# Patient Record
Sex: FEMALE | Race: WHITE | Employment: OTHER | URBAN - METROPOLITAN AREA
[De-identification: names, ages, dates, MRNs, and addresses within clinical notes are randomized per-mention and may not be internally consistent; named-entity substitution may affect disease eponyms.]

---

## 2017-02-21 ENCOUNTER — OFFICE VISIT (OUTPATIENT)
Dept: DERMATOLOGY | Facility: AMBULATORY SURGERY CENTER | Age: 77
End: 2017-02-21

## 2017-02-21 VITALS
OXYGEN SATURATION: 97 % | HEART RATE: 67 BPM | BODY MASS INDEX: 25.03 KG/M2 | WEIGHT: 136 LBS | TEMPERATURE: 98.6 F | RESPIRATION RATE: 17 BRPM | HEIGHT: 62 IN | SYSTOLIC BLOOD PRESSURE: 120 MMHG | DIASTOLIC BLOOD PRESSURE: 68 MMHG

## 2017-02-21 DIAGNOSIS — Z85.89 HISTORY OF SQUAMOUS CELL CARCINOMA: ICD-10-CM

## 2017-02-21 DIAGNOSIS — C44.319 BASAL CELL CARCINOMA OF LEFT CHEEK: Primary | ICD-10-CM

## 2017-02-21 RX ORDER — TRAMADOL HYDROCHLORIDE 50 MG/1
50 TABLET ORAL
COMMUNITY
End: 2017-05-16

## 2017-02-21 NOTE — PROGRESS NOTES
Written by Kae Hess, as dictated by [employer] Duane Burrow, [address]. Name: Jeovanny Palafox       Age: 68 y.o. Date: 2/21/2017    Chief Complaint: had concerns including Other. Subjective:    HPI:  Ms.. Jeovanny Palafox is a 68 y.o. female who was kindly referred by Dr. Caitlin Vences and presents for a consult prior to Mohs surgery for lesions on the right and left cheeks. The lesion appeared less than 6 months ago. The patient has not had any prior treatment. Associated symptoms include subtle persistent bump. The patient reports she cannot see either lesion and that both were noted by Dr. Caitlin Vences. 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 She reports that she saw Dr. Caitlin Vences last week and that  Dr. Caitlin Vences was unable to identify any remaining squamous cell carcinoma on her right cheek. Per the patient's report, Dr. Conrad Mullerer on continued observation of the lesion rather than any treatment at this time. She also notes that she has a polyp in her left nostril and is wondering if this will affect her surgery. ROS: Consitutional: Negative. Dermatological : positive for - basal and squamous cell carcinomas    Social History     Social History    Marital status: SINGLE     Spouse name: N/A    Number of children: N/A    Years of education: N/A     Occupational History    Not on file.      Social History Main Topics    Smoking status: Never Smoker    Smokeless tobacco: Never Used    Alcohol use No    Drug use: No    Sexual activity: Not on file     Other Topics Concern    Not on file     Social History Narrative       Family History   Problem Relation Age of Onset    Heart Disease Mother     Cancer Father 61     lung    Cataract Daughter      congenital cataracts    Heart Disease Daughter      fast heart rythmn       Past Medical History   Diagnosis Date    Diverticulitis     Dystrophy, cornea     Environmental allergies     GERD (gastroesophageal reflux disease)     Hypotension     Multiple drug allergies     Osteoporosis     Vertebral compression fracture (HCC) 01/05/12    Vocal cord anomaly      vocal cord dysfunction per speech therapist       Past Surgical History   Procedure Laterality Date    Hc bld carpel tunnel release      Hx gyn       D&C    Hx cataract removal  07/2012       Current Outpatient Prescriptions   Medication Sig Dispense Refill    triamcinolone acetonide (KENALOG) 0.1 % topical cream Apply  to affected area two (2) times daily as needed for Skin Irritation. use thin layer 80 g 6    calcium citrate 200 mg (950 mg) tablet Take 200 mg by mouth two (2) times a day.  fluticasone (FLONASE) 50 mcg/actuation nasal spray 2 Sprays by Both Nostrils route daily.  docusate sodium (COLACE) 100 mg capsule Take 1 Cap by mouth two (2) times a day for 90 days. 60 Cap 2    cholecalciferol, vitamin D3, 2,000 unit tab Take 1 Tab by mouth daily.  cyanocobalamin (VITAMIN B12) 500 mcg tablet Take 500 mcg by mouth daily.  Dexlansoprazole (DEXILANT) 60 mg CpDB Take 1 Cap by mouth daily. 30 Cap 0    loteprednol etabonate (LOTEMAX) 0.5 % ophthalmic suspension Administer 1 Drop to both eyes two (2) times a day.          Allergies   Allergen Reactions    Clindamycin Anaphylaxis    Ketek [Telithromycin] Anaphylaxis    Levaquin [Levofloxacin] Anaphylaxis    Methylprednisolone Rash     Facial rash    Nexium [Esomeprazole Magnesium] Anaphylaxis    Other Medication Anaphylaxis     Allergic reaction to allergy shots for dogs, cats, and birds    Vibramycin [Doxycycline Calcium] Rash     Facial rash    Voltaren [Diclofenac Sodium] Other (comments)     Mild throat closing and severe nausea    Cortisone Rash     Can take depro medrol if preservative free only    Mobic [Meloxicam] Other (comments)     Throat closing, severe nausea, abd pain and facial swelling    Afrin [Pseudoephedrine Sulfate] Other (comments)     Facial numbness that lasted 45 minutes during testing    Antihistamine-1 Anaphylaxis    Azithromycin Other (comments)     \"neck stiffness & face numbness & swelling\"    Bactrim [Sulfamethoprim Ds] Rash     Facial rash and swelling    Biaxin [Clarithromycin] Rash     Severe facial rash and swelling    Ceftin [Cefuroxime Axetil] Unknown (comments)    Ciprofloxacin Other (comments)     \"numbness and tingling in foot & leg\"    Codeine Angioedema    Doxycycline Anaphylaxis    Flagyl [Metronidazole] Other (comments)     Reports on the 7th day of taking it \"I had SEVERE Cranial pain that was not a headache\"     Pcn [Penicillins] Anaphylaxis    Restasis [Cyclosporine] Angioedema    Sulfamethoxazole Other (comments)     headache    Xyzal [Levocetirizine] Anaphylaxis         Objective:    Visit Vitals    Ht 5' 2\" (1.575 m)    Wt 136 lb (61.7 kg)    BMI 24.87 kg/m2       Monalisa Rae is a 68 y.o. female who appears well and in no distress. She is awake, alert, and oriented. There is no preauricular, submandibular, or cervical lymphadenopathy. A limited skin evaluation was completed including her face. There is a 6 x 3 mm subtle shiny pink papule on her left cheek. There is a small white biopsy scar on her right cheek, no remaining features of basal cell carcinoma are clinically evident. Assessment/Plan:    1. Squamous cell carcinoma, right cheek. The diagnosis of squamous cell carcinoma was reviewed and we will follow with Dr. Bravo Daniel for continued observation. 2. Basal cell carcinoma, left cheek. The diagnosis and process of Mohs surgery with primary closure was discussed. We reviewed cure rate and risks. Mohs surgery is indicated by site and poor definition. The patient's questions were answered and she understands and agrees with the management plan. She requests that this management plan be sent to Dr. Wily Sam, her PCP.         This plan was reviewed with the patient and patient agrees. All questions were answered. This scribe documentation was reviewed by me and accurately reflects the examination and decisions made by me.

## 2017-02-21 NOTE — PATIENT INSTRUCTIONS
Common Skin Findings    Below are some common benign skin conditions that may have been discussed during your exam:    Dermatofibroma - a benign scar like bump that often looks like a mole but dimples in with lateral pressure. No treatment is required unless it is symptomatic or enlarging in which case a biopsy may become necessary. Nguyen Angiomas - these are the bright red bumps usually found on the abdomen or trunk. They are benign. Sometimes they will become irritated if traumatized, bleed and need to be removed. They often appear in middle-aged to older adults. Skin Tags - these are benign flesh or brown polyp-like growths common around areas of friction such as the neck, body creases, or around eyes. Nevus - this is the technical term for a benign mole. Changing nevi need to be evaluated and a biopsy may become necessary. Lentigo - this is a benign brown spot usually due to sun exposure. Seborrheic Keratosis - is a common skin growth that is benign and most often appearing in middle-aged to older adults. Most are tan or brown but may vary from flesh colored to black. These start as small bumps that slowly thicken and get a warty surface. A very useful website that you can visit for more information on common skin conditions:  yaltyReview.it  This website was created and is maintained by the [address] [place] [address] [employer]. It is the largest, most influential and most representative dermatology group in the [place].

## 2017-02-21 NOTE — PROGRESS NOTES
Ms. Britany Norton comes in for consultation. She is a 70-year-old lady referred by Dr. Antolin Álvarez. She has a biopsy-proven squamous cell carcinoma in situ on her right cheek, and Ms. Britany Norton reports that this has essentially fully resolved after the biopsy and she and Dr. Antolin Álvarez will be observing this site, no surgical intervention is advised at present. She has a second skin cancer, a biopsy proven basal cell carcinoma on her left cheek, and she wishes to discuss surgery. She has several questions regarding the procedure. She is feeling well and in her usual state of health today. She has no pain, no current illnesses, no other skin concerns. Her allergies medications medical and social history are reviewed by me today. I have reviewed the history, physical exam, assessment and plan by Anil Fuentes NP and agree. She has numerous allergies, I have reviewed then. She tolerates bandages and local anesthetic without difficulty. She is awake alert well appearing lady in no distress. I examined her face. She has thin delicate skin with inflammation and mild edema on her periocular areas. Her right cheek shows only a faint scar where she indicates biopsy was performed, no residual lesion evident. Her left medial cheek near the nose cheek angle has a subtle pink scar 6 x 3 mm. She confirms this is location of the basal cell carcinoma. She and Dr. Antolin Álvarez will observe the site of squamous cell carcinoma in situ on her right cheek for any recurrence, she is aware this could be treated if necessary per Dr. Nichole Tovar recommendation. For the basal cell carcinoma her left cheek, this is still clinically evident, and treatment with Mohs surgery is appropriate given the site size and poor definition of the margins. The procedure was discussed with her, anticipated curvilinear primary closure was reviewed. Her questions and concerns were answered. She will return for surgery on April 11.

## 2017-02-21 NOTE — MR AVS SNAPSHOT
Visit Information Date & Time Provider Department Dept. Phone Encounter #  
 2/21/2017  1:30 PM JERICHO Edouardirapita [id number] [phone] Your Appointments 4/11/2017  1:00 PM  
SURGERY with MD Manju Arora [address]) Appt Note: est pt (2 of 2 )Jose Enrique Mitchell Osmar [facility] [phone]  
  
   
 [address] [address]  
  
    
 4/27/2017  1:00 PM  
SURGERY with MD Manju Arora [address]) Appt Note: Np (1 of 2)SCC Rt Cheek; Np (2 of 2)SCC Rt Cheek [facility] [place] [address] [address] [place] [address]  
[phone] Upcoming Health Maintenance Date Due DTaP/Tdap/Td series (1 - Tdap) 4/28/1961 ZOSTER VACCINE AGE 60> 4/28/2000 GLAUCOMA SCREENING Q2Y 4/28/2005 MEDICARE YEARLY EXAM 4/28/2005 Allergies as of 2/21/2017  Review Complete On: 2/21/2017 By: Abigail Vides, MONIKAN Severity Noted Reaction Type Reactions Clindamycin High 11/09/2015    Anaphylaxis Ketek [Telithromycin] High 03/01/2011    Anaphylaxis Levaquin [Levofloxacin] High 11/09/2015    Anaphylaxis Methylprednisolone High 11/09/2015    Rash Facial rash Nexium [Esomeprazole Magnesium] High 11/09/2015    Anaphylaxis Other Medication High 11/09/2015    Anaphylaxis Allergic reaction to allergy shots for dogs, cats, and birds Vibramycin [Doxycycline Calcium] High 11/09/2015    Rash Facial rash Voltaren [Diclofenac Sodium] High 11/09/2015    Other (comments) Mild throat closing and severe nausea Cortisone Medium 11/09/2015    Rash Can take depro medrol if preservative free only Mobic [Meloxicam] Medium 11/09/2015    Other (comments) Throat closing, severe nausea, abd pain and facial swelling Afrin [Pseudoephedrine Sulfate]  11/09/2015    Other (comments) Facial numbness that lasted 45 minutes during testing Antihistamine-1  03/01/2011    Anaphylaxis Azithromycin  07/24/2012    Other (comments) \"neck stiffness & face numbness & swelling\" Bactrim [Sulfamethoprim Ds]  11/09/2015    Rash Facial rash and swelling Biaxin [Clarithromycin]  11/09/2015    Rash Severe facial rash and swelling Ceftin [Cefuroxime Axetil]  07/24/2012    Unknown (comments) Ciprofloxacin  07/24/2012    Other (comments) \"numbness and tingling in foot & leg\" Codeine  07/24/2012    Angioedema Doxycycline  03/01/2011    Anaphylaxis Flagyl [Metronidazole]  12/21/2016    Other (comments) Reports on the 7th day of taking it \"I had SEVERE Cranial pain that was not a headache\" Pcn [Penicillins]  03/01/2011    Anaphylaxis Restasis [Cyclosporine]  07/24/2012    Angioedema Sulfamethoxazole  07/24/2012    Other (comments)  
 headache Xyzal [Levocetirizine]  03/01/2011    Anaphylaxis Current Immunizations  Reviewed on 11/4/2016 Name Date Influenza High Dose Vaccine PF 9/25/2016 Pneumococcal Conjugate (PCV-13) 1/6/2017 Pneumococcal Polysaccharide (PPSV-23) 1/1/2010 Not reviewed this visit Vitals BP Pulse Temp Resp Height(growth percentile) Weight(growth percentile) 120/68 67 98.6 °F (37 °C) (Oral) 17 5' 2\" (1.575 m) 136 lb (61.7 kg) SpO2 BMI OB Status Smoking Status 97% 24.87 kg/m2 Postmenopausal Never Smoker Vitals History BMI and BSA Data Body Mass Index Body Surface Area  
 24.87 kg/m 2 1.64 m 2 Preferred Pharmacy Pharmacy Name Phone [id number] [phone] Your Updated Medication List  
  
   
This list is accurate as of: 2/21/17  1:37 PM.  Always use your most recent med list.  
  
  
  
  
 calcium citrate 200 mg (950 mg) tablet Take 200 mg by mouth two (2) times a day. cholecalciferol (vitamin D3) 2,000 unit Tab Take 1 Tab by mouth daily. cyanocobalamin 500 mcg tablet Commonly known as:  VITAMIN B12 Take 500 mcg by mouth daily. Dexlansoprazole 60 mg Cpdb Commonly known as:  DEXILANT Take 1 Cap by mouth daily. docusate sodium 100 mg capsule Commonly known as:  [place] Take 1 Cap by mouth two (2) times a day for 90 days. FLONASE 50 mcg/actuation nasal spray Generic drug:  fluticasone 2 Sprays by Both Nostrils route daily. loteprednol etabonate 0.5 % ophthalmic suspension Commonly known as:  Catherine Tara Administer 1 Drop to both eyes two (2) times a day. traMADol 50 mg tablet Commonly known as:  ULTRAM  
Take 50 mg by mouth every six (6) hours as needed for Pain.  
  
 triamcinolone acetonide 0.1 % topical cream  
Commonly known as:  KENALOG Apply  to affected area two (2) times daily as needed for Skin Irritation. use thin layer Patient Instructions Common Skin Findings Below are some common benign skin conditions that may have been discussed during your exam: 
 
Dermatofibroma  a benign scar like bump that often looks like a mole but dimples in with lateral pressure. No treatment is required unless it is symptomatic or enlarging in which case a biopsy may become necessary. Nguyen Angiomas  these are the bright red bumps usually found on the abdomen or trunk. They are benign. Sometimes they will become irritated if traumatized, bleed and need to be removed. They often appear in middle-aged to older adults. Skin Tags  these are benign flesh or brown polyp-like growths common around areas of friction such as the neck, body creases, or around eyes. Nevus  this is the technical term for a benign mole. Changing nevi need to be evaluated and a biopsy may become necessary. Lentigo  this is a benign brown spot usually due to sun exposure. Seborrheic Keratosis  is a common skin growth that is benign and most often appearing in middle-aged to older adults. Most are tan or brown but may vary from flesh colored to black. These start as small bumps that slowly thicken and get a warty surface. A very useful website that you can visit for more information on common skin conditions: 
LoyaltyReview.it This website was created and is maintained by the [facility] of Dermat[employer]. It is the largest, most influential and most representative dermatology group in the [place]. Introducing [facility] & [facility] SERVICES! Ric Rosado introduces Tripvi patient portal. Now you can access parts of your medical record, email your doctor's office, and request medication refills online. 1. In your internet browser, go to [email or web address] 2. Click on the First Time User? Click Here link in the Sign In box. You will see the New Member Sign Up page. 3. Enter your Tripvi Access Code exactly as it appears below. You will not need to use this code after youve completed the sign-up process. If you do not sign up before the expiration date, you must request a new code. · Tripvi Access Code: [email or web address] Expires: 3/5/2017 10:53 AM 
 
4. Enter the last four digits of your Social Security Number (xxxx) and Date of Birth (mm/dd/yyyy) as indicated and click Submit. You will be taken to the next sign-up page. 5. Create a Audiotoniqt ID. This will be your Tripvi login ID and cannot be changed, so think of one that is secure and easy to remember. 6. Create a Audiotoniqt password. You can change your password at any time. 7. Enter your Password Reset Question and Answer. This can be used at a later time if you forget your password. 8. Enter your e-mail address. You will receive e-mail notification when new information is available in [address] 9. Click Sign Up. You can now view and download portions of your medical record. 10. Click the Download Summary menu link to download a portable copy of your medical information. If you have questions, please visit the Frequently Asked Questions section of the "[employer] website. Remember, "[employer] is NOT to be used for urgent needs. For medical emergencies, dial 911. Now available from your iPhone and Android! Please provide this summary of care documentation to your next provider. Your primary care clinician is listed as Peter. If you have any questions after today's visit, please call [phone].

## 2017-03-30 ENCOUNTER — HOSPITAL ENCOUNTER (OUTPATIENT)
Dept: MAMMOGRAPHY | Age: 77
Discharge: HOME | End: 2017-03-30
Attending: INTERNAL MEDICINE
Payer: MEDICARE

## 2017-03-30 DIAGNOSIS — Z12.31 SCREENING MAMMOGRAM, ENCOUNTER FOR: ICD-10-CM

## 2017-03-30 PROCEDURE — 77067 SCR MAMMO BI INCL CAD: CPT

## 2017-04-03 ENCOUNTER — TELEPHONE (OUTPATIENT)
Dept: SURGERY | Age: 77
End: 2017-04-03

## 2017-04-03 ENCOUNTER — TELEPHONE (OUTPATIENT)
Dept: DERMATOLOGY | Facility: AMBULATORY SURGERY CENTER | Age: 77
End: 2017-04-03

## 2017-04-03 NOTE — TELEPHONE ENCOUNTER
Patient called previously, the reason for her second call is she gave the wrong phone number [phone] is the correct number. She has a questions, she read in the pamphlet that she is to stop all vitamins and supplements. She is concerned because she takes calcium and Vit B and is afraid to stop it all together.

## 2017-04-11 ENCOUNTER — OFFICE VISIT (OUTPATIENT)
Dept: DERMATOLOGY | Facility: AMBULATORY SURGERY CENTER | Age: 77
End: 2017-04-11

## 2017-04-11 VITALS
HEART RATE: 75 BPM | HEIGHT: 62 IN | OXYGEN SATURATION: 97 % | DIASTOLIC BLOOD PRESSURE: 64 MMHG | SYSTOLIC BLOOD PRESSURE: 110 MMHG | TEMPERATURE: 98.3 F

## 2017-04-11 DIAGNOSIS — C44.319 BASAL CELL CARCINOMA OF LEFT MEDIAL CHEEK: Primary | ICD-10-CM

## 2017-04-11 RX ORDER — BUPIVACAINE HYDROCHLORIDE AND EPINEPHRINE 2.5; 5 MG/ML; UG/ML
INJECTION, SOLUTION INFILTRATION; PERINEURAL
Qty: 1.5 ML | Refills: 0 | Status: CANCELLED
Start: 2017-04-11

## 2017-04-11 RX ORDER — LIDOCAINE HYDROCHLORIDE AND EPINEPHRINE 10; 10 MG/ML; UG/ML
3 INJECTION, SOLUTION INFILTRATION; PERINEURAL ONCE
Qty: 3 ML | Refills: 0
Start: 2017-04-11 | End: 2017-04-11

## 2017-04-11 RX ORDER — BUPIVACAINE HYDROCHLORIDE AND EPINEPHRINE 2.5; 5 MG/ML; UG/ML
INJECTION, SOLUTION INFILTRATION; PERINEURAL
Qty: 1.5 ML | Refills: 0
Start: 2017-04-11 | End: 2017-05-16

## 2017-04-11 RX ORDER — LIDOCAINE HYDROCHLORIDE AND EPINEPHRINE 10; 10 MG/ML; UG/ML
3 INJECTION, SOLUTION INFILTRATION; PERINEURAL ONCE
Qty: 3 ML | Refills: 0 | Status: CANCELLED
Start: 2017-04-11 | End: 2017-04-11

## 2017-04-11 NOTE — PROGRESS NOTES
This note is written by Melo Esquivel, as dictated by Mushtaq Winters. Germain Razo MD.    CC: Basal cell carcinoma on the left cheek    History of present illness:     Alia Lund is a 68 y.o. female referred by Dr. Shayne Perez. She has a biopsy-proven basal cell carcinoma on the left cheek. This is a new basal cell carcinoma present for less 6 months, no pain, bleeding, itching, or crusting, and no prior treatment. Dr. Shayne Perez first noted the lesion at a routine skin exam. Biopsy confirmed the diagnosis of micronodular basal cell carcinoma, and I reviewed the written pathology. She is feeling well and in her usual state of health today. She has no pain, no current illnesses, no other skin concerns. Her allergies, medications, medical, and social history are reviewed by me today. She has a history of squamous cell carcinoma in situ on her right cheek that was biopsied and clinically cleared by Dr. Shayne Perez; they are continuing to observe the lesion for any recurrence. Exam:     She is an awake, alert, and oriented 68 y.o. female who appears well and in no distress. There is no preauricular, submandibular, or cervical lymphadenopathy. I examined her face. Her right cheek shows only a faint scar where she indicates biopsy was performed, no residual lesion evident. Her left medial cheek near the nose cheek angle has a subtle white and pink scar 6 x 3 mm. She confirms location. Assessment/plan:    1. Basal cell carcinoma, left cheek. I discussed the diagnosis of basal cell carcinoma and summarized the pathology report. Mohs surgery is indicated by site, size, poor definition and a micronodular histology. The procedure was discussed, verbal and written consent were obtained. I performed the procedure. One stage was required to reach a tumor free plane. The surgical defect was managed with intermediate repair. There were no complications. She will follow up as needed as the site heals.     Indications, risks, and options were discussed with Kristin Amador preoperatively. Risks including, but not limited to: pain, bleeding, infection, tumor recurrence, scarring and damage to motor and/or sensory nerves, were discussed. Kristin Amador chose Mohs surgery. Kristin Amador was an acceptable surgery candidate. Kristin Amador was placed in the appropriate position on the operating table in the Mohs surgery procedure room. The area was prepped and draped in the standard manner. Gentian violet was used to outline the clinical margins of the tumor. Local anesthesia was then obtained. The grossly visible tumor was then removed, an underlying layer was excised and mapped according to the Mohs technique, and the individual specimens examined microscopically. The process was repeated until microscopic examination of the tissue specimens confirmed a tumor-free plane. Hemostasis was obtained with electrosurgery and pressure. The wound was covered between stages with moist saline gauze. Wound care instructions (written and verbal) and a follow up appointment were given to Kristin Amador before discharge. Krisitn Amador was discharged in good condition. The wound management options of second intent healing, layered closure, local flap, and/or full thickness skin graft were discussed. Kristin Amador understands the aims, risks, alternatives, and possible complications and elects to proceed with an intermediate layered closure. Wound margins were made vertical, edges undermined in the subcutaneous plane, standing cones corrected at both poles followed by layered closure. The wound was closed with buried 6-0 polysorb suture in the subcutis to reduce tension on the skin edges, and skin edges were approximated with 6-0 polysorb suture in the dermis to reduce tension on the epidermis. Few 6-0 fast gut sutures were used at the surface to further approximate skin edges. The final closure length was 16 mm.  The wound was bandaged with a pressure bandage utilizing Petroleum ointment, Telfa, gauze and Coverroll. Wound care instructions (written and verbal) and a follow up appointment were given to Roxanna Payton before discharge. Roxanna Payton was discharged in good condition. 2. Squamous cell carcinoma, left cheek. This site is well-healed with no evidence of residual.  She and Dr. Aleah Singh will observe the site of squamous cell carcinoma in situ on her right cheek for any recurrence, she is aware this could be treated if necessary per Dr. Jenaro Nuñez recommendation. 3. History of skin cancer. I discussed the diagnosis and recommend routine examinations with Dr. Aleah Singh for surveillance. The documentation recorded by the scribe accurately reflects the service I personally performed and the decisions made by me. [facility]   OFFICE PROCEDURE PROGRESS NOTE     Chart reviewed for the following:     Nely Pyle MD, have reviewed the History, Physical and updated the Allergic reactions for [address] performed immediately prior to start of procedure:     Nely Pyle MD, have performed the following reviews on Roxanna Payton prior to the start of the procedure:     * Patient was identified by name and date of birth   * Agreement on procedure being performed was verified   * Risks and Benefits explained to the patient   * Procedure site verified and marked as necessary   * Patient was positioned for comfort   * Consent was signed and verified     Time: 1:20 PM  Date of procedure: 4/11/2017  Procedure performed by: Farhan Pyle MD   Provider assisted by: LPN   Patient assisted by: self   How tolerated by patient: tolerated the procedure well with no complications   Comments: none

## 2017-04-11 NOTE — MR AVS SNAPSHOT
Visit Information Date & Time Provider Department Dept. Phone Encounter #  
 4/11/2017  1:00 PM Reji Gillette MD [facility] [phone] Upcoming Health Maintenance Date Due DTaP/Tdap/Td series (1 - Tdap) 4/28/1961 ZOSTER VACCINE AGE 60> 4/28/2000 GLAUCOMA SCREENING Q2Y 4/28/2005 MEDICARE YEARLY EXAM 4/28/2005 Allergies as of 4/11/2017  Review Complete On: 4/11/2017 By: Reji Gillette MD  
  
 Severity Noted Reaction Type Reactions Clindamycin High 11/09/2015    Anaphylaxis Ketek [Telithromycin] High 03/01/2011    Anaphylaxis Levaquin [Levofloxacin] High 11/09/2015    Anaphylaxis Methylprednisolone High 11/09/2015    Rash Facial rash Nexium [Esomeprazole Magnesium] High 11/09/2015    Anaphylaxis Other Medication High 11/09/2015    Anaphylaxis Allergic reaction to allergy shots for dogs, cats, and birds Vibramycin [Doxycycline Calcium] High 11/09/2015    Rash Facial rash Voltaren [Diclofenac Sodium] High 11/09/2015    Other (comments) Mild throat closing and severe nausea Cortisone Medium 11/09/2015    Rash Can take depro medrol if preservative free only Mobic [Meloxicam] Medium 11/09/2015    Other (comments) Throat closing, severe nausea, abd pain and facial swelling Afrin [Pseudoephedrine Sulfate]  11/09/2015    Other (comments) Facial numbness that lasted 45 minutes during testing Antihistamine-1  03/01/2011    Anaphylaxis Azithromycin  07/24/2012    Other (comments) \"neck stiffness & face numbness & swelling\" Bactrim [Sulfamethoprim Ds]  11/09/2015    Rash Facial rash and swelling Biaxin [Clarithromycin]  11/09/2015    Rash Severe facial rash and swelling Ceftin [Cefuroxime Axetil]  07/24/2012    Unknown (comments) Ciprofloxacin  07/24/2012    Other (comments) \"numbness and tingling in foot & leg\" Codeine  07/24/2012    Angioedema Doxycycline  03/01/2011    Anaphylaxis Flagyl [Metronidazole]  12/21/2016    Other (comments) Reports on the 7th day of taking it \"I had SEVERE Cranial pain that was not a headache\" Pcn [Penicillins]  03/01/2011    Anaphylaxis Restasis [Cyclosporine]  07/24/2012    Angioedema Sulfamethoxazole  07/24/2012    Other (comments)  
 headache Xyzal [Levocetirizine]  03/01/2011    Anaphylaxis Current Immunizations  Reviewed on 11/4/2016 Name Date Influenza High Dose Vaccine PF 9/25/2016 Pneumococcal Conjugate (PCV-13) 1/6/2017 Pneumococcal Polysaccharide (PPSV-23) 1/1/2010 Not reviewed this visit You Were Diagnosed With   
  
 Codes Comments Basal cell carcinoma of left medial cheek    -  Primary ICD-10-CM: C44.319 ICD-9-CM: 173.31 Vitals BP Pulse Temp Height(growth percentile) SpO2 OB Status 110/64 (BP 1 Location: Left arm, BP Patient Position: Sitting) 75 98.3 °F (36.8 °C) 5' 2\" (1.575 m) 97% Postmenopausal  
 Smoking Status Never Smoker Preferred Pharmacy Pharmacy Name Phone [id number] [place] [phone] Your Updated Medication List  
  
   
This list is accurate as of: 4/11/17  1:41 PM.  Always use your most recent med list.  
  
  
  
  
 calcium citrate 200 mg (950 mg) tablet Take 200 mg by mouth two (2) times a day. cholecalciferol (vitamin D3) 2,000 unit Tab Take 1 Tab by mouth daily. cyanocobalamin 500 mcg tablet Commonly known as:  VITAMIN B12 Take 500 mcg by mouth daily. Dexlansoprazole 60 mg Cpdb Commonly known as:  DEXILANT Take 1 Cap by mouth daily. FLONASE 50 mcg/actuation nasal spray Generic drug:  fluticasone 2 Sprays by Both Nostrils route daily. loteprednol etabonate 0.5 % ophthalmic suspension Commonly known as:  Landon Edward Administer 1 Drop to both eyes two (2) times a day. traMADol 50 mg tablet Commonly known as:  ULTRAM  
Take 50 mg by mouth every six (6) hours as needed for Pain.  
  
 triamcinolone acetonide 0.1 % topical cream  
Commonly known as:  KENALOG Apply  to affected area two (2) times daily as needed for Skin Irritation. use thin layer Patient Instructions WOUND CARE INSTRUCTIONS 1. Keep the dressing clean and dry and do not remove for 24/48 hours. 2. Then change the dressing once a day as follows: 
a. Wash hands before and after each dressing change. b. Remove dressing and wash site gently with mild soap and water, rinse, and pat dry. 
c. Apply an ointment (Petroleum jelly or Aquaphor). d. Apply a non-stick (Telfa) dressing or Band-Aid to cover the wound. Remove pressure bandage Thursday, then wash gently and apply Vaseline and a band-aid to site daily for 1 week. 3. Watch for: BLEEDING: A small amount of drainage may occur. If bleeding occurs, elevate and rest the surgery site. Apply gauze and steady pressure for 15 minutes. If bleeding continues, call this office. INFECTION: Signs of infection include increased redness, pain, warmth, drainage of pus, and fever. If this occurs, call this office. 4. Special Instructions (follow any that are checked): ·  You have stitches that need to be removed in 0 days ·  Avoid bending at the waist and heavy lifting for two days. ·  Sleep with your head elevated for the next two nights. ·  Rest the surgery site and keep it elevated as much as possible for two days. ·  You may apply an ice-pack for 10-15 minutes every waking hour for the rest of the day. ·  Eat a soft diet and avoid hot food and hot drinks for the rest of the day. ·  Other instructions: Follow up as directed. Take Tylenol for pain as needed.  
 
 
Once the site is healed with no remaining bandages or open areas, protect your surgical site and scar from the sun, as this area will be more sensitive. Use a broad spectrum sunscreen SPF 30 or higher daily, and a chemical free product (one containing zinc oxide or titanium dioxide) is a good choice if the area is sensitive. You may begin to gently massage the surgical site in 2-3 weeks, rubbing in a circular motion along the scar. This can help reduce swelling and thickness of a scar. A scar cream may be used beginnning 1 month after the surgery. If you have any questions or concerns, please call our office Monday through Friday at [phone]. Introducing [facility]! [employer] introduces [employer] patient portal. Now you can access parts of your medical record, email your doctor's office, and request medication refills online. 1. In your internet browser, go to [email or web address] 2. Click on the First Time User? Click Here link in the Sign In box. You will see the New Member Sign Up page. 3. Enter your [employer] Access Code exactly as it appears below. You will not need to use this code after youve completed the sign-up process. If you do not sign up before the expiration date, you must request a new code. · [employer] Access Code: [email or web address] Expires: 6/28/2017 10:29 AM 
 
4. Enter the last four digits of your Social Security Number (xxxx) and Date of Birth (mm/dd/yyyy) as indicated and click Submit. You will be taken to the next sign-up page. 5. Create a [employer] ID. This will be your [employer] login ID and cannot be changed, so think of one that is secure and easy to remember. 6. Create a [employer] password. You can change your password at any time. 7. Enter your Password Reset Question and Answer. This can be used at a later time if you forget your password. 8. Enter your e-mail address. You will receive e-mail notification when new information is available in [address] 9. Click Sign Up. You can now view and download portions of your medical record. 10. Click the Download Summary menu link to download a portable copy of your medical information. If you have questions, please visit the Frequently Asked Questions section of the [employer] website. Remember, [employer] is NOT to be used for urgent needs. For medical emergencies, dial 911. Now available from your iPhone and Android! Please provide this summary of care documentation to your next provider. Your primary care clinician is listed as Peter. If you have any questions after today's visit, please call [phone].

## 2017-04-11 NOTE — PROGRESS NOTES
Pre-op: Patient present today for the evaluation of Basal cell carcinoma to the Left medial cheek. Procedure explained with full understanding. Vitals:    04/11/17 1307   BP: 110/64   Pulse: 75   Temp: 98.3 °F (36.8 °C)   SpO2: 97%   Height: 5' 2\" (1.575 m)     preoperatively, will continue to monitor. Post-op: Written and verbal post-op wound care instructions given to patient with full understanding of care. Surgical wound bandaged with Vaseline, Telfa, 2x2 gauze, and coverall tape. All questions and concerns addressed. Vitals stable postoperatively.

## 2017-04-11 NOTE — PATIENT INSTRUCTIONS
WOUND CARE INSTRUCTIONS    1. Keep the dressing clean and dry and do not remove for 24/48 hours. 2. Then change the dressing once a day as follows:  a. Wash hands before and after each dressing change. b. Remove dressing and wash site gently with mild soap and water, rinse, and pat dry.  c. Apply an ointment (Petroleum jelly or Aquaphor). d. Apply a non-stick (Telfa) dressing or Band-Aid to cover the wound. Remove pressure bandage Thursday, then wash gently and apply Vaseline and a band-aid to site daily for 1 week. 3. Watch for:  BLEEDING: A small amount of drainage may occur. If bleeding occurs, elevate and rest the surgery site. Apply gauze and steady pressure for 15 minutes. If bleeding continues, call this office. INFECTION: Signs of infection include increased redness, pain, warmth, drainage of pus, and fever. If this occurs, call this office. 4. Special Instructions (follow any that are checked):  · [] You have stitches that need to be removed in 0 days  · [x] Avoid bending at the waist and heavy lifting for two days. · [x] Sleep with your head elevated for the next two nights. · [x] Rest the surgery site and keep it elevated as much as possible for two days. · [x] You may apply an ice-pack for 10-15 minutes every waking hour for the rest of the day. · [] Eat a soft diet and avoid hot food and hot drinks for the rest of the day. · [] Other instructions: Follow up as directed. Take Tylenol for pain as needed. Once the site is healed with no remaining bandages or open areas, protect your surgical site and scar from the sun, as this area will be more sensitive. Use a broad spectrum sunscreen SPF 30 or higher daily, and a chemical free product (one containing zinc oxide or titanium dioxide) is a good choice if the area is sensitive. You may begin to gently massage the surgical site in 2-3 weeks, rubbing in a circular motion along the scar.  This can help reduce swelling and thickness of a scar. A scar cream may be used beginnning 1 month after the surgery. If you have any questions or concerns, please call our office Monday through Friday at [phone].

## 2017-04-14 ENCOUNTER — TELEPHONE (OUTPATIENT)
Dept: DERMATOLOGY | Facility: AMBULATORY SURGERY CENTER | Age: 77
End: 2017-04-14

## 2017-04-17 ENCOUNTER — TELEPHONE (OUTPATIENT)
Dept: DERMATOLOGY | Facility: AMBULATORY SURGERY CENTER | Age: 77
End: 2017-04-17

## 2017-04-17 NOTE — TELEPHONE ENCOUNTER
Spoke to pt. Pt states she had 4 small bumps on her surgical site when she called Friday. Pt has been applying warm compresses and states area is improving. Pt denies any s/s of infection. Pt will continue to apply warm compresses and vaseline 2-3 times a day and call us back if there are any changes or if symptoms worsen. Dr General Bedoya made aware.

## 2017-04-17 NOTE — TELEPHONE ENCOUNTER
Patient stated that she had surgery last. She spoke with a nurse on Friday regarding blisters on the surgery site. There were 4 blisters and she was told to call back if the blisters where still there on today. There are 2 currently there so the area is improving but wanted to speak with a nurse regarding the site. Please call on her cell phone [phone].     Thanks,

## 2017-05-08 ENCOUNTER — HOSPITAL ENCOUNTER (OUTPATIENT)
Dept: CT IMAGING | Age: 77
Discharge: HOME | End: 2017-05-08
Attending: INTERNAL MEDICINE
Payer: MEDICARE

## 2017-05-08 DIAGNOSIS — R10.31 RLQ ABDOMINAL PAIN: ICD-10-CM

## 2017-05-08 LAB — CREAT BLD-MCNC: 0.6 MG/DL (ref 0.6–1.3)

## 2017-05-08 PROCEDURE — 74177 CT ABD & PELVIS W/CONTRAST: CPT

## 2017-05-08 PROCEDURE — 82565 ASSAY OF CREATININE: CPT

## 2017-05-08 PROCEDURE — 74011636320 HC RX REV CODE- 636/320: Performed by: INTERNAL MEDICINE

## 2017-05-08 PROCEDURE — 74011000258 HC RX REV CODE- 258: Performed by: INTERNAL MEDICINE

## 2017-05-08 RX ORDER — SODIUM CHLORIDE 0.9 % (FLUSH) 0.9 %
10 SYRINGE (ML) INJECTION
Status: COMPLETED | OUTPATIENT
Start: 2017-05-08 | End: 2017-05-08

## 2017-05-08 RX ADMIN — IOPAMIDOL 100 ML: 755 INJECTION, SOLUTION INTRAVENOUS at 16:20

## 2017-05-08 RX ADMIN — Medication 10 ML: at 16:21

## 2017-05-08 RX ADMIN — SODIUM CHLORIDE 100 ML: 900 INJECTION, SOLUTION INTRAVENOUS at 16:21

## 2017-05-08 RX ADMIN — IOHEXOL 50 ML: 240 INJECTION, SOLUTION INTRATHECAL; INTRAVASCULAR; INTRAVENOUS; ORAL at 16:21

## 2017-05-16 ENCOUNTER — APPOINTMENT (OUTPATIENT)
Dept: GENERAL RADIOLOGY | Age: 77
DRG: 641 | End: 2017-05-16
Attending: EMERGENCY MEDICINE
Payer: MEDICARE

## 2017-05-16 ENCOUNTER — HOSPITAL ENCOUNTER (INPATIENT)
Age: 77
LOS: 1 days | Discharge: HOME | DRG: 641 | End: 2017-05-17
Attending: EMERGENCY MEDICINE | Admitting: FAMILY MEDICINE
Payer: MEDICARE

## 2017-05-16 ENCOUNTER — APPOINTMENT (OUTPATIENT)
Dept: CT IMAGING | Age: 77
DRG: 641 | End: 2017-05-16
Attending: EMERGENCY MEDICINE
Payer: MEDICARE

## 2017-05-16 DIAGNOSIS — R10.32 ABDOMINAL PAIN, LLQ (LEFT LOWER QUADRANT): ICD-10-CM

## 2017-05-16 DIAGNOSIS — R19.7 DIARRHEA, UNSPECIFIED TYPE: ICD-10-CM

## 2017-05-16 DIAGNOSIS — E87.1 HYPONATREMIA: Primary | ICD-10-CM

## 2017-05-16 DIAGNOSIS — E86.0 DEHYDRATION: ICD-10-CM

## 2017-05-16 LAB
ALBUMIN SERPL BCP-MCNC: 4 G/DL (ref 3.5–5)
ALBUMIN/GLOB SERPL: 1.1 {RATIO} (ref 1.1–2.2)
ALP SERPL-CCNC: 51 U/L (ref 45–117)
ALT SERPL-CCNC: 37 U/L (ref 12–78)
ANION GAP BLD CALC-SCNC: 9 MMOL/L (ref 5–15)
APPEARANCE UR: CLEAR
AST SERPL W P-5'-P-CCNC: 35 U/L (ref 15–37)
BACTERIA URNS QL MICRO: NEGATIVE /HPF
BASOPHILS # BLD AUTO: 0.1 K/UL (ref 0–0.1)
BASOPHILS # BLD: 1 % (ref 0–1)
BILIRUB SERPL-MCNC: 0.3 MG/DL (ref 0.2–1)
BILIRUB UR QL: NEGATIVE
BUN SERPL-MCNC: 3 MG/DL (ref 6–20)
BUN/CREAT SERPL: 4 (ref 12–20)
CALCIUM SERPL-MCNC: 9.3 MG/DL (ref 8.5–10.1)
CHLORIDE SERPL-SCNC: 89 MMOL/L (ref 97–108)
CO2 SERPL-SCNC: 27 MMOL/L (ref 21–32)
COLOR UR: ABNORMAL
CREAT SERPL-MCNC: 0.73 MG/DL (ref 0.55–1.02)
EOSINOPHIL # BLD: 0.5 K/UL (ref 0–0.4)
EOSINOPHIL NFR BLD: 11 % (ref 0–7)
EPITH CASTS URNS QL MICRO: ABNORMAL /LPF
ERYTHROCYTE [DISTWIDTH] IN BLOOD BY AUTOMATED COUNT: 13.3 % (ref 11.5–14.5)
GLOBULIN SER CALC-MCNC: 3.6 G/DL (ref 2–4)
GLUCOSE SERPL-MCNC: 136 MG/DL (ref 65–100)
GLUCOSE UR STRIP.AUTO-MCNC: NEGATIVE MG/DL
HCT VFR BLD AUTO: 36.5 % (ref 35–47)
HGB BLD-MCNC: 12.4 G/DL (ref 11.5–16)
HGB UR QL STRIP: NEGATIVE
KETONES UR QL STRIP.AUTO: NEGATIVE MG/DL
LEUKOCYTE ESTERASE UR QL STRIP.AUTO: ABNORMAL
LIPASE SERPL-CCNC: 200 U/L (ref 73–393)
LYMPHOCYTES # BLD AUTO: 27 % (ref 12–49)
LYMPHOCYTES # BLD: 1.3 K/UL (ref 0.8–3.5)
MCH RBC QN AUTO: 28.6 PG (ref 26–34)
MCHC RBC AUTO-ENTMCNC: 34 G/DL (ref 30–36.5)
MCV RBC AUTO: 84.1 FL (ref 80–99)
MONOCYTES # BLD: 0.4 K/UL (ref 0–1)
MONOCYTES NFR BLD AUTO: 8 % (ref 5–13)
NEUTS SEG # BLD: 2.6 K/UL (ref 1.8–8)
NEUTS SEG NFR BLD AUTO: 53 % (ref 32–75)
NITRITE UR QL STRIP.AUTO: NEGATIVE
PH UR STRIP: 7 [PH] (ref 5–8)
PLATELET # BLD AUTO: 308 K/UL (ref 150–400)
POTASSIUM SERPL-SCNC: 4.3 MMOL/L (ref 3.5–5.1)
PROT SERPL-MCNC: 7.6 G/DL (ref 6.4–8.2)
PROT UR STRIP-MCNC: NEGATIVE MG/DL
RBC # BLD AUTO: 4.34 M/UL (ref 3.8–5.2)
RBC #/AREA URNS HPF: ABNORMAL /HPF (ref 0–5)
SODIUM SERPL-SCNC: 125 MMOL/L (ref 136–145)
SP GR UR REFRACTOMETRY: 1.01 (ref 1–1.03)
UA: UC IF INDICATED,UAUC: ABNORMAL
UROBILINOGEN UR QL STRIP.AUTO: 0.2 EU/DL (ref 0.2–1)
WBC # BLD AUTO: 4.9 K/UL (ref 3.6–11)
WBC URNS QL MICRO: ABNORMAL /HPF (ref 0–4)

## 2017-05-16 PROCEDURE — 36415 COLL VENOUS BLD VENIPUNCTURE: CPT | Performed by: FAMILY MEDICINE

## 2017-05-16 PROCEDURE — 80053 COMPREHEN METABOLIC PANEL: CPT | Performed by: EMERGENCY MEDICINE

## 2017-05-16 PROCEDURE — 74011636320 HC RX REV CODE- 636/320: Performed by: EMERGENCY MEDICINE

## 2017-05-16 PROCEDURE — 65270000029 HC RM PRIVATE

## 2017-05-16 PROCEDURE — 96361 HYDRATE IV INFUSION ADD-ON: CPT

## 2017-05-16 PROCEDURE — 99284 EMERGENCY DEPT VISIT MOD MDM: CPT

## 2017-05-16 PROCEDURE — 74011000258 HC RX REV CODE- 258: Performed by: EMERGENCY MEDICINE

## 2017-05-16 PROCEDURE — 74011250636 HC RX REV CODE- 250/636: Performed by: EMERGENCY MEDICINE

## 2017-05-16 PROCEDURE — 74000 XR ABD (KUB): CPT

## 2017-05-16 PROCEDURE — 74177 CT ABD & PELVIS W/CONTRAST: CPT

## 2017-05-16 PROCEDURE — 81001 URINALYSIS AUTO W/SCOPE: CPT | Performed by: FAMILY MEDICINE

## 2017-05-16 PROCEDURE — 85025 COMPLETE CBC W/AUTO DIFF WBC: CPT | Performed by: EMERGENCY MEDICINE

## 2017-05-16 PROCEDURE — 80048 BASIC METABOLIC PNL TOTAL CA: CPT | Performed by: FAMILY MEDICINE

## 2017-05-16 PROCEDURE — 83690 ASSAY OF LIPASE: CPT | Performed by: FAMILY MEDICINE

## 2017-05-16 PROCEDURE — 96360 HYDRATION IV INFUSION INIT: CPT

## 2017-05-16 RX ORDER — SODIUM CHLORIDE 0.9 % (FLUSH) 0.9 %
5-10 SYRINGE (ML) INJECTION AS NEEDED
Status: DISCONTINUED | OUTPATIENT
Start: 2017-05-16 | End: 2017-05-17 | Stop reason: HOSPADM

## 2017-05-16 RX ORDER — SODIUM CHLORIDE 0.9 % (FLUSH) 0.9 %
10 SYRINGE (ML) INJECTION
Status: COMPLETED | OUTPATIENT
Start: 2017-05-16 | End: 2017-05-16

## 2017-05-16 RX ORDER — DOCUSATE SODIUM 100 MG/1
100 CAPSULE, LIQUID FILLED ORAL
COMMUNITY

## 2017-05-16 RX ORDER — SODIUM CHLORIDE 0.9 % (FLUSH) 0.9 %
5-10 SYRINGE (ML) INJECTION EVERY 8 HOURS
Status: DISCONTINUED | OUTPATIENT
Start: 2017-05-16 | End: 2017-05-17 | Stop reason: HOSPADM

## 2017-05-16 RX ORDER — POLYETHYLENE GLYCOL 3350 17 G/17G
17 POWDER, FOR SOLUTION ORAL
COMMUNITY

## 2017-05-16 RX ADMIN — IOPAMIDOL 100 ML: 755 INJECTION, SOLUTION INTRAVENOUS at 19:16

## 2017-05-16 RX ADMIN — SODIUM CHLORIDE 1000 ML: 900 INJECTION, SOLUTION INTRAVENOUS at 19:05

## 2017-05-16 RX ADMIN — SODIUM CHLORIDE 100 ML: 900 INJECTION, SOLUTION INTRAVENOUS at 19:16

## 2017-05-16 RX ADMIN — Medication 10 ML: at 19:16

## 2017-05-16 NOTE — ED TRIAGE NOTES
Patient arrives from home for diverticulitis complications. Reports not having a solid bowel movement for eight days. Reports taking flagyl and bactrim. Patient reports taking miralax double dose yesterday without results. Patient is also taking colace. Reports abd pain and distention.

## 2017-05-16 NOTE — IP AVS SNAPSHOT
Current Discharge Medication List  
  
CONTINUE these medications which have NOT CHANGED Dose & Instructions Dispensing Information Comments Morning Noon Evening Bedtime CENTRUM SILVER WOMEN PO Your last dose was: Your next dose is:    
   
   
 Dose:  1 Tab Take 1 Tab by mouth daily. Refills:  0  
     
   
   
   
  
 cholecalciferol (vitamin D3) 2,000 unit Tab Your last dose was: Your next dose is:    
   
   
 Dose:  1 Tab Take 1 Tab by mouth daily. Refills:  0  
     
   
   
   
  
 CITRACAL PO Your last dose was: Your next dose is:    
   
   
 Dose:  1 Tab Take 1 Tab by mouth two (2) times a day. Refills:  0  
     
   
   
   
  
 cyanocobalamin 500 mcg tablet Commonly known as:  VITAMIN B12 Your last dose was: Your next dose is:    
   
   
 Dose:  500 mcg Take 500 mcg by mouth daily. Refills:  0 Dexlansoprazole 60 mg Cpdb Commonly known as:  DEXILANT Your last dose was: Your next dose is:    
   
   
 Dose:  60 mg Take 1 Cap by mouth daily. Quantity:  30 Cap Refills:  0  
     
   
   
   
  
 docusate sodium 100 mg capsule Commonly known as:  Angeles Angle Your last dose was: Your next dose is:    
   
   
 Dose:  100 mg Take 100 mg by mouth daily. Refills:  0  
     
   
   
   
  
 FLONASE 50 mcg/actuation nasal spray Generic drug:  fluticasone Your last dose was: Your next dose is:    
   
   
 Dose:  2 Spray 2 Sprays by Both Nostrils route daily. Refills:  0  
     
   
   
   
  
 loteprednol etabonate 0.5 % ophthalmic suspension Commonly known as:  Jamar Mary Your last dose was: Your next dose is:    
   
   
 Dose:  1 Drop Administer 1 Drop to both eyes two (2) times a day. Refills:  0 MIRALAX 17 gram packet Generic drug:  polyethylene glycol Your last dose was: Your next dose is:    
   
   
 Dose:  17 g Take 17 g by mouth daily. Refills:  0  
     
   
   
   
  
 trimethoprim-sulfamethoxazole 160-800 mg per tablet Commonly known as:  BACTRIM DS, SEPTRA DS Your last dose was: Your next dose is:    
   
   
 Dose:  1 Tab Take 1 Tab by mouth two (2) times a day for 10 days. Quantity:  20 Tab Refills:  0 STOP taking these medications   
 metroNIDAZOLE 500 mg tablet Commonly known as:  FLAGYL

## 2017-05-16 NOTE — ED PROVIDER NOTES
HPI Comments: 68 y.o. female with past medical history significant for GERD, diverticulitis, hypotension, and skin cancer who presents from home with chief complaint of abdominal distension. Pt states that 8 days ago she had a CT of her abdomen that showed mild diverticulitis(results reviewed below). She was started on Bactrim and Flagyl at the time. She has been taking the medications as prescribed but she has been feeling worse and worse. She has not had a full solid bowel movement in 8 days and states that she is constipated. She has only been having liquid bowel movements. She has been taking miralax and colace with minimal relief. She says that her abdomen is distended, she is nauseous, and she becomes dizzy every time she stands up. There are no other acute medical concerns at this time. Social hx: Never Smoker. Denies alcohol use  PCP: Torrey Badillo MD    5/8/2017 abd pelv ct:  IMPRESSION  IMPRESSION: Probable minimal sigmoid diverticulitis. No evidence of perforation  or abscess. Normal appendix. Note written by Shasta Conrad, as dictated by Patti Brooks MD 7:15 PM      The history is provided by the patient. No  was used.         Past Medical History:   Diagnosis Date    Diverticulitis     Dystrophy, cornea     Environmental allergies     Family history of skin cancer     GERD (gastroesophageal reflux disease)     Hypotension     Multiple drug allergies     Osteoporosis     Sun-damaged skin     Vertebral compression fracture ([facility] [address] 01/05/12    Vocal cord anomaly     vocal cord dysfunction per speech therapist       Past Surgical History:   Procedure Laterality Date    HC BLD CARPEL TUNNEL RELEASE      HX CATARACT REMOVAL  07/2012    HX GYN      D&C    HX MOHS PROCEDURES  04/11/2017    BCC left cheek by Dr. Irina Freeman         Family History:   Problem Relation Age of Onset    Heart Disease Mother     Cancer Father 61     lung    Cataract Daughter      congenital cataracts    Heart Disease Daughter      fast heart rythmn       Social History     Social History    Marital status:      Spouse name: N/A    Number of children: N/A    Years of education: N/A     Occupational History    Not on file. Social History Main Topics    Smoking status: Never Smoker    Smokeless tobacco: Never Used    Alcohol use No    Drug use: No    Sexual activity: Not on file     Other Topics Concern    Not on file     Social History Narrative         ALLERGIES: Clindamycin; Ketek [telithromycin]; Levaquin [levofloxacin]; Methylprednisolone; Nexium [esomeprazole magnesium]; Other medication; Vibramycin [doxycycline calcium]; Voltaren [diclofenac sodium]; Cortisone; Mobic [meloxicam]; Afrin [pseudoephedrine sulfate]; Antihistamine-1; Azithromycin; Bactrim [sulfamethoprim ds]; Biaxin [clarithromycin]; Ceftin [cefuroxime axetil]; Ciprofloxacin; Codeine; Doxycycline; Flagyl [metronidazole]; Pcn [penicillins]; Restasis [cyclosporine]; Sulfamethoxazole; and Xyzal [levocetirizine]    Review of Systems   Gastrointestinal: Positive for abdominal distention, abdominal pain, constipation, diarrhea and nausea. Neurological: Positive for dizziness. All other systems reviewed and are negative. Vitals:    05/16/17 1737   BP: 115/75   Pulse: 77   Resp: 16   SpO2: 96%   Weight: 57.6 kg (127 lb)   Height: 5' 2\" (1.575 m)            Physical Exam     Nursing note and vitals reviewed. Constitutional: appears well-developed and well-nourished. No distress. HENT:   Head: Normocephalic and atraumatic. Sclera anicteric  Nose: No rhinorrhea  Mouth/Throat: Oropharynx is clear and DRY. Pharynx normal  Eyes: Conjunctivae are normal. Pupils are equal, round, and reactive to light. Right eye exhibits no discharge. Left eye exhibits no discharge. No scleral icterus. Neck: Painless normal range of motion.  Supple  Cardiovascular: Normal rate, regular rhythm, normal heart sounds and intact distal pulses. Exam reveals no gallop and no friction rub. No murmur heard. Pulmonary/Chest: Effort normal and breath sounds normal. No respiratory distress. no wheezes. no rales. Abdominal: Soft. Bowel sounds are normal. Exhibits no distension and no mass. MILD TENDERNESS LLQ No guarding. Musculoskeletal: Normal range of motion. no tenderness. No edema  Lymphadenopathy:   No cervical adenopathy. Neurological:  Alert and oriented to person, place, and time. Coordination normal. CN 2-12 intact. Moving all extremities. Skin: Skin is warm and dry. No rash noted. No pallor. MDM  Number of Diagnoses or Management Options  Diagnosis management comments: 79-year-old female with a history of diverticulitis on day 8 of antibiotics here with continued lower abdominal pain which has improved but also with constipation and nausea. Differential diagnosis includes complications of diverticulitis such as abscess or perforation, continued diverticulitis, partial obstruction and others. Patient also with some dizziness upon standing so we'll give IV fluids. Check labs, CT abdomen and pelvis with IV contrast.  pt does not want to drink oral contrast.      ED Course       Procedures  PROGRESS NOTE:  8:19 PM  Pt's sodium 125. Pt symptomatic given her dizziness and feeling as if she will fall. Pt at risk from injury from fall related to hyponatremia.   will call hospitalist and admit. 8:20 PM  Patient is being admitted to the hospital.  The results of their tests and reasons for their admission have been discussed with them and/or available family. They convey agreement and understanding for the need to be admitted and for their admission diagnosis. Consultation will be made now with the inpatient physician for hospitalization. CONSULT NOTE:  9:09 PM Cony Sebastian MD spoke with Dr. Kelsey Carcamo, Consult for Hospitalist.  Discussed available diagnostic tests and clinical findings.   He is in agreement with care plans as outlined and will come admit the Pt. Recent Results (from the past 24 hour(s))   CBC WITH AUTOMATED DIFF    Collection Time: 05/16/17  5:44 PM   Result Value Ref Range    WBC 4.9 3.6 - 11.0 K/uL    RBC 4.34 3.80 - 5.20 M/uL    HGB 12.4 11.5 - 16.0 g/dL    HCT 36.5 35.0 - 47.0 %    MCV 84.1 80.0 - 99.0 FL    MCH 28.6 26.0 - 34.0 PG    MCHC 34.0 30.0 - 36.5 g/dL    RDW 13.3 11.5 - 14.5 %    PLATELET 860 603 - 519 K/uL    NEUTROPHILS 53 32 - 75 %    LYMPHOCYTES 27 12 - 49 %    MONOCYTES 8 5 - 13 %    EOSINOPHILS 11 (H) 0 - 7 %    BASOPHILS 1 0 - 1 %    ABS. NEUTROPHILS 2.6 1.8 - 8.0 K/UL    ABS. LYMPHOCYTES 1.3 0.8 - 3.5 K/UL    ABS. MONOCYTES 0.4 0.0 - 1.0 K/UL    ABS. EOSINOPHILS 0.5 (H) 0.0 - 0.4 K/UL    ABS. BASOPHILS 0.1 0.0 - 0.1 K/UL   METABOLIC PANEL, COMPREHENSIVE    Collection Time: 05/16/17  5:44 PM   Result Value Ref Range    Sodium 125 (L) 136 - 145 mmol/L    Potassium 4.3 3.5 - 5.1 mmol/L    Chloride 89 (L) 97 - 108 mmol/L    CO2 27 21 - 32 mmol/L    Anion gap 9 5 - 15 mmol/L    Glucose 136 (H) 65 - 100 mg/dL    BUN 3 (L) 6 - 20 MG/DL    Creatinine 0.73 0.55 - 1.02 MG/DL    BUN/Creatinine ratio 4 (L) 12 - 20      GFR est AA >60 >60 ml/min/1.73m2    GFR est non-AA >60 >60 ml/min/1.73m2    Calcium 9.3 8.5 - 10.1 MG/DL    Bilirubin, total 0.3 0.2 - 1.0 MG/DL    ALT (SGPT) 37 12 - 78 U/L    AST (SGOT) 35 15 - 37 U/L    Alk. phosphatase 51 45 - 117 U/L    Protein, total 7.6 6.4 - 8.2 g/dL    Albumin 4.0 3.5 - 5.0 g/dL    Globulin 3.6 2.0 - 4.0 g/dL    A-G Ratio 1.1 1.1 - 2.2         Xr Abd (kub)    Result Date: 5/16/2017  History: Constipation. An AP radiograph of the abdomen demonstrates an unremarkable bowel gas pattern. There are no significantly dilated large or small bowel loops. An L4 compression deformity is stable since 2016. IMPRESSION: Unremarkable bowel gas pattern.     Ct Abd Pelv W Cont    Result Date: 5/16/2017  INDICATION: lower abd pain and reported constipation with nausea, h/o diverticulitis COMPARISON: May 8 TECHNIQUE: Following the uneventful intravenous administration of 100 cc Isovue-370, thin axial images were obtained through the abdomen and pelvis. Coronal and sagittal reconstructions were generated. Oral contrast was not administered. CT dose reduction was achieved through use of a standardized protocol tailored for this examination and automatic exposure control for dose modulation. FINDINGS: LUNG BASES: There is subsegmental right basilar atelectasis. INCIDENTALLY IMAGED HEART AND MEDIASTINUM: Unremarkable. LIVER: No mass or biliary dilatation. GALLBLADDER: Unremarkable. SPLEEN: No mass. PANCREAS: No mass or ductal dilatation. ADRENALS: Unremarkable. KIDNEYS: There is a right renal cyst. STOMACH: Unremarkable. SMALL BOWEL: No dilatation or wall thickening. COLON: No dilatation or wall thickening. APPENDIX: Unremarkable. PERITONEUM: No ascites or pneumoperitoneum. RETROPERITONEUM: No lymphadenopathy or aortic aneurysm. REPRODUCTIVE ORGANS: URINARY BLADDER: No mass or calculus. BONES: No destructive bone lesion. Multiple compression deformities, unchanged. ADDITIONAL COMMENTS: N/A     IMPRESSION: No acute findings. The previously seen area of diverticulitis has resolved.

## 2017-05-17 VITALS
DIASTOLIC BLOOD PRESSURE: 65 MMHG | SYSTOLIC BLOOD PRESSURE: 98 MMHG | RESPIRATION RATE: 16 BRPM | HEIGHT: 62 IN | HEART RATE: 74 BPM | OXYGEN SATURATION: 95 % | BODY MASS INDEX: 23.08 KG/M2 | WEIGHT: 125.4 LBS | TEMPERATURE: 98.1 F

## 2017-05-17 LAB
ANION GAP BLD CALC-SCNC: 8 MMOL/L (ref 5–15)
ANION GAP BLD CALC-SCNC: 9 MMOL/L (ref 5–15)
BASOPHILS # BLD AUTO: 0 K/UL (ref 0–0.1)
BASOPHILS # BLD: 1 % (ref 0–1)
BUN SERPL-MCNC: 2 MG/DL (ref 6–20)
BUN SERPL-MCNC: 3 MG/DL (ref 6–20)
BUN/CREAT SERPL: 3 (ref 12–20)
BUN/CREAT SERPL: 5 (ref 12–20)
CALCIUM SERPL-MCNC: 8.5 MG/DL (ref 8.5–10.1)
CALCIUM SERPL-MCNC: 8.7 MG/DL (ref 8.5–10.1)
CHLORIDE SERPL-SCNC: 99 MMOL/L (ref 97–108)
CHLORIDE SERPL-SCNC: 99 MMOL/L (ref 97–108)
CO2 SERPL-SCNC: 24 MMOL/L (ref 21–32)
CO2 SERPL-SCNC: 25 MMOL/L (ref 21–32)
CREAT SERPL-MCNC: 0.62 MG/DL (ref 0.55–1.02)
CREAT SERPL-MCNC: 0.62 MG/DL (ref 0.55–1.02)
EOSINOPHIL # BLD: 0.6 K/UL (ref 0–0.4)
EOSINOPHIL NFR BLD: 15 % (ref 0–7)
ERYTHROCYTE [DISTWIDTH] IN BLOOD BY AUTOMATED COUNT: 13.4 % (ref 11.5–14.5)
GLUCOSE SERPL-MCNC: 91 MG/DL (ref 65–100)
GLUCOSE SERPL-MCNC: 93 MG/DL (ref 65–100)
HCT VFR BLD AUTO: 35.9 % (ref 35–47)
HGB BLD-MCNC: 12.2 G/DL (ref 11.5–16)
LYMPHOCYTES # BLD AUTO: 27 % (ref 12–49)
LYMPHOCYTES # BLD: 1 K/UL (ref 0.8–3.5)
MCH RBC QN AUTO: 28.6 PG (ref 26–34)
MCHC RBC AUTO-ENTMCNC: 34 G/DL (ref 30–36.5)
MCV RBC AUTO: 84.1 FL (ref 80–99)
MONOCYTES # BLD: 0.5 K/UL (ref 0–1)
MONOCYTES NFR BLD AUTO: 12 % (ref 5–13)
NEUTS SEG # BLD: 1.7 K/UL (ref 1.8–8)
NEUTS SEG NFR BLD AUTO: 45 % (ref 32–75)
PLATELET # BLD AUTO: 295 K/UL (ref 150–400)
POTASSIUM SERPL-SCNC: 3.7 MMOL/L (ref 3.5–5.1)
POTASSIUM SERPL-SCNC: 3.7 MMOL/L (ref 3.5–5.1)
RBC # BLD AUTO: 4.27 M/UL (ref 3.8–5.2)
SODIUM SERPL-SCNC: 132 MMOL/L (ref 136–145)
SODIUM SERPL-SCNC: 132 MMOL/L (ref 136–145)
TSH SERPL DL<=0.05 MIU/L-ACNC: 3.38 UIU/ML (ref 0.36–3.74)
WBC # BLD AUTO: 3.8 K/UL (ref 3.6–11)

## 2017-05-17 PROCEDURE — 74011250637 HC RX REV CODE- 250/637: Performed by: FAMILY MEDICINE

## 2017-05-17 PROCEDURE — 74011000250 HC RX REV CODE- 250: Performed by: FAMILY MEDICINE

## 2017-05-17 PROCEDURE — 85025 COMPLETE CBC W/AUTO DIFF WBC: CPT | Performed by: FAMILY MEDICINE

## 2017-05-17 PROCEDURE — 80048 BASIC METABOLIC PNL TOTAL CA: CPT | Performed by: FAMILY MEDICINE

## 2017-05-17 PROCEDURE — 84443 ASSAY THYROID STIM HORMONE: CPT | Performed by: FAMILY MEDICINE

## 2017-05-17 PROCEDURE — 36415 COLL VENOUS BLD VENIPUNCTURE: CPT | Performed by: FAMILY MEDICINE

## 2017-05-17 RX ORDER — DOCUSATE SODIUM 100 MG/1
100 CAPSULE, LIQUID FILLED ORAL DAILY
Status: DISCONTINUED | OUTPATIENT
Start: 2017-05-17 | End: 2017-05-17 | Stop reason: HOSPADM

## 2017-05-17 RX ORDER — LOTEPREDNOL ETABONATE 5 MG/ML
1 SUSPENSION/ DROPS OPHTHALMIC 2 TIMES DAILY
Status: DISCONTINUED | OUTPATIENT
Start: 2017-05-17 | End: 2017-05-17 | Stop reason: HOSPADM

## 2017-05-17 RX ORDER — ACETAMINOPHEN 325 MG/1
650 TABLET ORAL
Status: DISCONTINUED | OUTPATIENT
Start: 2017-05-17 | End: 2017-05-17 | Stop reason: HOSPADM

## 2017-05-17 RX ORDER — POLYETHYLENE GLYCOL 3350 17 G/17G
17 POWDER, FOR SOLUTION ORAL DAILY
Status: DISCONTINUED | OUTPATIENT
Start: 2017-05-17 | End: 2017-05-17 | Stop reason: HOSPADM

## 2017-05-17 RX ORDER — PANTOPRAZOLE SODIUM 40 MG/1
40 TABLET, DELAYED RELEASE ORAL
Status: DISCONTINUED | OUTPATIENT
Start: 2017-05-17 | End: 2017-05-17 | Stop reason: HOSPADM

## 2017-05-17 RX ORDER — MELATONIN
2000 DAILY
Status: DISCONTINUED | OUTPATIENT
Start: 2017-05-17 | End: 2017-05-17 | Stop reason: HOSPADM

## 2017-05-17 RX ORDER — FLUTICASONE PROPIONATE 50 MCG
2 SPRAY, SUSPENSION (ML) NASAL DAILY
Status: DISCONTINUED | OUTPATIENT
Start: 2017-05-17 | End: 2017-05-17 | Stop reason: HOSPADM

## 2017-05-17 RX ORDER — CALCIUM CARBONATE 500(1250)
500 TABLET ORAL 2 TIMES DAILY
Status: DISCONTINUED | OUTPATIENT
Start: 2017-05-17 | End: 2017-05-17 | Stop reason: HOSPADM

## 2017-05-17 RX ORDER — LANOLIN ALCOHOL/MO/W.PET/CERES
500 CREAM (GRAM) TOPICAL DAILY
Status: DISCONTINUED | OUTPATIENT
Start: 2017-05-17 | End: 2017-05-17 | Stop reason: HOSPADM

## 2017-05-17 RX ORDER — DEXLANSOPRAZOLE 60 MG/1
60 CAPSULE, DELAYED RELEASE ORAL DAILY
Status: DISCONTINUED | OUTPATIENT
Start: 2017-05-17 | End: 2017-05-17 | Stop reason: CLARIF

## 2017-05-17 RX ADMIN — LOTEPREDNOL ETABONATE 1 DROP: 5 SUSPENSION/ DROPS OPHTHALMIC at 06:51

## 2017-05-17 RX ADMIN — Medication 10 ML: at 01:04

## 2017-05-17 RX ADMIN — ACETAMINOPHEN 650 MG: 325 TABLET, FILM COATED ORAL at 01:52

## 2017-05-17 NOTE — ROUTINE PROCESS
TRANSFER - OUT REPORT:    Verbal report given to Tien Price Rn(name) on Koki Ortega  being transferred to (unit) for routine progression of care       Report consisted of patients Situation, Background, Assessment and   Recommendations(SBAR). Information from the following report(s) SBAR, Kardex, ED Summary and MAR was reviewed with the receiving nurse. Lines:   Peripheral IV 05/16/17 Right Antecubital (Active)   Site Assessment Clean, dry, & intact 5/16/2017  5:45 PM   Phlebitis Assessment 0 5/16/2017  5:45 PM   Infiltration Assessment 0 5/16/2017  5:45 PM   Dressing Status Clean, dry, & intact 5/16/2017  5:45 PM        Opportunity for questions and clarification was provided.       Patient transported with:  transport

## 2017-05-17 NOTE — PROGRESS NOTES
Care Management-Patient to ED for diverticulitis complications. Patient was diagnosed with diverticulitis as an outpatient. Per CT, this has resolved. Patient currently awaiting admission. Per patient, she is being admitted for low sodium. Met with patient and her daughter, Ele Philip ([phone]) in patient's ED room. Patient lives alone in an apartment. She does not use any medical equipment. She is independent with her ADLs and she drives. Patient last saw her PCP Dr. Reanna Bryant on 5-8-17. Patient uses [facility] on [facility]. She has used [address] in the past. No transition of care needs identified or verbalized at this time. Care Manager will be available to assist if any needs do arise. Care Management Interventions  PCP Verified by CM: Yes (Dr. Reanna Bryant)  Last Visit to PCP: 05/08/17  Mode of Transport at Discharge:  Other (see comment) (family or friend)  Discharge Durable Medical Equipment: No  Physical Therapy Consult: No  Occupational Therapy Consult: No  Speech Therapy Consult: No  Current Support Network: Lives Alone  Confirm Follow Up Transport: Family  Plan discussed with Pt/Family/Caregiver: Yes  Discharge Location  Discharge Placement: [facility], [facility]

## 2017-05-17 NOTE — PROGRESS NOTES
1000 MD called r/t pt stating BL foot numbness and tingling.   He will follow up at her discharge visit

## 2017-05-17 NOTE — PROGRESS NOTES
Admission Medication Reconciliation:    Information obtained from: patient    Significant PMH/Disease States:   Past Medical History:   Diagnosis Date    Diverticulitis     Dystrophy, cornea     Environmental allergies     Family history of skin cancer     GERD (gastroesophageal reflux disease)     Hypotension     Multiple drug allergies     Osteoporosis     Sun-damaged skin     Vertebral compression fracture ([facility] [address] 01/05/12    Vocal cord anomaly     vocal cord dysfunction per speech therapist       Chief Complaint for this Admission:    Chief Complaint   Patient presents with    Constipation    Nausea       Allergies:  Clindamycin; Ketek [telithromycin]; Levaquin [levofloxacin]; Methylprednisolone; Nexium [esomeprazole magnesium]; Other medication; Voltaren [diclofenac sodium]; Cortisone; Mobic [meloxicam]; Vibramycin [doxycycline calcium]; Afrin [pseudoephedrine sulfate]; Antihistamine-1; Azithromycin; Bactrim [sulfamethoprim ds]; Biaxin [clarithromycin]; Ceftin [cefuroxime axetil]; Ciprofloxacin; Codeine; Doxycycline; Flagyl [metronidazole]; Pcn [penicillins]; Restasis [cyclosporine]; and Xyzal [levocetirizine]    Prior to Admission Medications:   Prior to Admission Medications   Prescriptions Last Dose Informant Patient Reported? Taking? CALCIUM CITRATE (CITRACAL PO) 5/16/2017 at Unknown time  Yes Yes   Sig: Take 1 Tab by mouth two (2) times a day. Dexlansoprazole (DEXILANT) 60 mg CpDB 5/16/2017 at Unknown time  No Yes   Sig: Take 1 Cap by mouth daily. MULTIVIT-MIN/IRON/FOLIC/LUTEIN (CENTRUM SILVER WOMEN PO) 5/16/2017 at Unknown time  Yes Yes   Sig: Take 1 Tab by mouth daily. cholecalciferol, vitamin D3, 2,000 unit tab 5/16/2017 at Unknown time  Yes Yes   Sig: Take 1 Tab by mouth daily. cyanocobalamin (VITAMIN B12) 500 mcg tablet 5/16/2017 at Unknown time  Yes Yes   Sig: Take 500 mcg by mouth daily.    docusate sodium (COLACE) 100 mg capsule 5/16/2017 at Unknown time  Yes Yes   Sig: Take 100 mg by mouth daily. fluticasone (FLONASE) 50 mcg/actuation nasal spray 2017 at Unknown time  Yes Yes   Si Sprays by Both Nostrils route daily. loteprednol etabonate (LOTEMAX) 0.5 % ophthalmic suspension 2017 at am  Yes Yes   Sig: Administer 1 Drop to both eyes two (2) times a day. metroNIDAZOLE (FLAGYL) 500 mg tablet 2017 at 1200  No Yes   Sig: Take 1 Tab by mouth four (4) times daily for 10 days. polyethylene glycol (MIRALAX) 17 gram packet 2017 at Unknown time  Yes Yes   Sig: Take 17 g by mouth daily. trimethoprim-sulfamethoxazole (BACTRIM DS, SEPTRA DS) 160-800 mg per tablet 2017 at am  No Yes   Sig: Take 1 Tab by mouth two (2) times a day for 10 days.       Facility-Administered Medications: None         Comments/Recommendations:   Removed bupivacaine-epi, tramadol, triamcinolone  Added polyethylene glycol, docusate sodium, Centrum silver    Patient states she took extra doses of polyethylene glycol and docusate sodium yesterday    Metronidazole and Bactrim started 5/8/17 x10 days-  Patient states he MD told her she can stop the metronidazole today but to complete the Bactrim prescription (2 more days)

## 2017-05-17 NOTE — PROGRESS NOTES
Sodium better at 132. Home today. I rec some limited PO fluid restriction. She will cont another couple of days of Bactrim, but she will stop the Flagyl, as it was upsetting her stomach -- Diverticulitis resolved by CT yesterday anyway. F/u with me 1 week.

## 2017-05-17 NOTE — PROGRESS NOTES
Bedside shift change report given to joselito (oncoming nurse) by Mary Stallings (offgoing nurse). Report included the following information SBAR, Kardex and MAR.

## 2017-05-17 NOTE — DISCHARGE SUMMARY
Physician Discharge Summary     Patient ID:  Arabella Enriquez  [id number]  89 y.o.  1940    Admit date: 5/16/2017    Discharge date and time: 5/17/2017    Briefly, pt was admitted with Acute hyponatremia. For details of admission, see H&P. Hospital Course:  Pt was given IVF, and sodium improved to 132. Urine osm pending at this time. She will f/u with me in 1 week. Discharge Dx: same    Condition at discharge: improved    Disposition: home    Patient Instructions:   Current Discharge Medication List      CONTINUE these medications which have NOT CHANGED    Details   CALCIUM CITRATE (CITRACAL PO) Take 1 Tab by mouth two (2) times a day. polyethylene glycol (MIRALAX) 17 gram packet Take 17 g by mouth daily. docusate sodium (COLACE) 100 mg capsule Take 100 mg by mouth daily. MULTIVIT-MIN/IRON/FOLIC/LUTEIN (CENTRUM SILVER WOMEN PO) Take 1 Tab by mouth daily. trimethoprim-sulfamethoxazole (BACTRIM DS, SEPTRA DS) 160-800 mg per tablet Take 1 Tab by mouth two (2) times a day for 10 days. Qty: 20 Tab, Refills: 0    Associated Diagnoses: RLQ abdominal pain      fluticasone (FLONASE) 50 mcg/actuation nasal spray 2 Sprays by Both Nostrils route daily. cholecalciferol, vitamin D3, 2,000 unit tab Take 1 Tab by mouth daily. cyanocobalamin (VITAMIN B12) 500 mcg tablet Take 500 mcg by mouth daily. Dexlansoprazole (DEXILANT) 60 mg CpDB Take 1 Cap by mouth daily. Qty: 30 Cap, Refills: 0      loteprednol etabonate (LOTEMAX) 0.5 % ophthalmic suspension Administer 1 Drop to both eyes two (2) times a day. STOP taking these medications       metroNIDAZOLE (FLAGYL) 500 mg tablet Comments:   Reason for Stopping: Follow-up with Dr. Farrah East in 1 week.       Signed:  Karie Rondon MD  5/17/2017  7:11 AM

## 2017-05-17 NOTE — DISCHARGE INSTRUCTIONS
Patient Discharge Instructions    Jeannine Santiago / [id number] : 1940    Admitted 2017 Discharged: 2017     Take Home Medications       · It is important that you take the medication exactly as they are prescribed. · Keep your medication in the bottles provided by the pharmacist and keep a list of the medication names, dosages, and times to be taken in your wallet. · Do not take other medications without consulting your doctor. What to do at Home    Recommended diet: Regular Diet. Recommended activity: Activity as tolerated. Follow-up with Dr. Reva Mcnair in 1 week. Information obtained by :  I understand that if any problems occur once I am at home I am to contact my physician. I understand and acknowledge receipt of the instructions indicated above.                                                                                                                                            Physician's or R.N.'s Signature                                                                  Date/Time                                                                                                                                              Patient or Representative Signature                                                          Date/Time

## 2017-05-17 NOTE — PHYSICIAN ADVISORY
Letter of Status Determination:    Recommend Changing patient status from INPATIENT to OBSERVATION           Pt Name:  Bar Travis   MR#  [id number]   [facility]#   [id number]   [address] [place] [address]  214/01  @ [place] [address] hospital   Hospitalization date  5/16/2017  6:03 PM   Current Attending Physician  Jessica Butt MD   Principal diagnosis  Acute hyponatremia      Clinicals  68 y.o. y.o  female hospitalized with above diagnosis. The pt was treated for about 12 hours now. Her Na+ is improving. Etiology not established.    Pt is being discharged at this time      [facility] criteria   Does  NOT apply    STATUS DETERMINATION  On the basis of review of available clinical data, documentation in the chart  It is our recommendation that the patient's status is changed frm INPATIENT to OBSERVATION         The final decision of the patient's hospitalization status depends on the attending physician's judgment      Additional comments     Insurance  Payor: Shanice King / Plan: [address] / Product Type: Medicare /             Jolene Cuellar MD MPH FACP     Physician Advisor    [address]   [facility], [address] [place] [address]    Cell  [phone]

## 2017-05-17 NOTE — H&P
[address] [facility] [address]   HISTORY AND PHYSICAL       Name:  Lilibeth Linton   MR#:  [id number]   :  1940   Account #:  [de-identified]        Date of Adm:  2017       CHIEF COMPLAINT: Abdominal distention, nausea. HISTORY OF PRESENT ILLNESS: A 77-year-old white female with   past medical history of diverticulitis, GERD, hypotension, MULTIPLE   DRUG ALLERGIES, osteoporosis, vertebral compression fracture,   vocal cord dysfunction (unspecified), presented to the emergency   department from home with chief complaint of abdominal distention   and nausea. The patient reportedly had been diagnosed with   diverticulitis as noted on CT abdomen and pelvis with IV contrast on   2017 which shows probable minimal sigmoid diverticulitis with no   evidence of perforation or abscess with normal appendix. The patient   since has been taking BACTRIM and FLAGYL. She reportedly has   been taking medication as prescribed. She notes, however, she is only   to take the BACTRIM with food and notes the FLAGYL has been   causing her to have nausea. She notes that her last solid stool was   approximately 8 days ago and only has been having loose watery stool   for the last 8 days, minimal output. She reportedly has been taking   MiraLax and Colace with minimal relief of constipation. She has since   had progressive abdominal distention. She describes her pain as   \"discomfort\", rated now it is about a 1/10, mostly pointing to the   bilateral lower quadrants, \"sore\" in character without specific   exacerbating or alleviating factors. She does not complain of any   vomiting; however, she has had persistent nausea. She does not   complain of any chest pain, shortness of breath, cough, congestion. She complains of feeling \"wobbly\", unsteady on her feet, generalized   weakness, had some dizziness and lightheadedness.  Does not   complain of a headache, syncope, loss of consciousness, numbness,   paresthesias, slurred speech, facial droop, visual disturbance, dysuria,   hematuria, calf pain, swelling, edema or fever. However, she admits to   a few chills. On arrival to the emergency department, initial recorded vital signs   were blood pressure 115/75, heart rate 77, respiratory rate 16, O2   saturation 96% on room air. The patient was reportedly initially seen by   her PCP this evening. Initially there were plans to continue antibiotics,   however, this was prior to obtaining abdominal imaging. Since that   time, the patient has had a CT abdomen and pelvis with IV contrast,   which shows no acute findings with resolution of diverticulitis. The   patient notably had remained afebrile and white cell count has been   within normal limits. Per the ED, the patient was given 0.9% normal   saline 1000 mL IV fluid bolus x1. Labs showed evidence of   hyponatremia, sodium 125. The patient is now seen for admission to   the hospitalist service for continued evaluation and treatment with   noted hyponatremia. PAST MEDICAL HISTORY   1. Diverticulitis. 2. Cornea dystrophy. 3. GERD. 4. Hypotension. 5. MULTIPLE DRUG ALLERGIES. 6. Osteoporosis. 7. Sun damaged skin. 8. Vertebral compression fracture. 9. Back pain. 10. Vocal cord dysfunction, unspecified - per chart record. PAST SURGICAL HISTORY   1. Status post carpal tunnel release. 2. Cataract extraction/intraocular lens implant. 3. D and C.   4. Mohs procedure, basal cell carcinoma of the left cheek on   04/11/2017. MEDICATIONS    1. Calcium citrate 1 tablet p.o. b.i.d.   2. Vitamin D3 with 2000 units 1 tablet p.o. daily. 3. Vitamin B12 with 500 mcg p.o. daily. 4. Dexilant 60 mg 1 capsule p.o. daily. 5. Colace 100 mg p.o. daily. 6. Flonase 50 mcg per actuation 2 sprays both nostrils daily.    7. Lotemax 0.5% ophthalmic solution 1 drop both eyes b.i.d.   8. Flagyl 500 mg 1 tablet p.o. q.i.d.   9. Centrum Silver multivitamin 1 tablet p.o. daily. 10. MiraLax 17 grams p.o. daily. 11. Bactrim-/800 mg 1 tablet p.o. b.i.d. ALLERGIES   1. CLINDAMYCIN. 2. [address] 3. LEVAQUIN. [id number] [facility],[address]. 6. DICLOFENAC. 7. CORTISONE. 8. MOBIC. 9. VIBRAMYCIN (DOXYCYCLINE). 10. ASPIRIN. 11. ANTIHISTAMINE - 1. 12. AZITHROMYCIN. 13. BACTRIM (REACTION FACIAL SWELLING AND RASH). 14. ERYTHROMYCIN. 15. CEFUROXIME. 16. CIPROFLOXACIN. 17. CODEINE. 18. FLAGYL, \"I HAD SEVERE CRANIAL PAIN THAT WAS NOT A   HEADACHE\", PER CHART RECORDS. 19. PENICILLIN.   20. RESTASIS (CYCLOSPORINE). 21. XYZAL. SOCIAL HISTORY: She denies smoking, alcohol, illicit drugs. . FAMILY HISTORY: Heart disease in daughter, mother. Lung cancer -   father. Cataract - daughter. REVIEW OF SYSTEMS: Eleven systems reviewed; pertinent positives   as HPI, otherwise negative. VITAL SIGNS: Temperature 98.6 degrees Fahrenheit, blood pressure   107/66, heart rate of 73, respiratory rate 18, O2 saturation 97% on   room air. Recorded weight 127 pounds (57.6 kg), recorded height 5   feet 2 inches tall. PHYSICAL EXAMINATION   GENERAL: The patient in no acute respiratory distress. PSYCHIATRIC: The patient is awake, alert, oriented x3. NEUROLOGIC: GCS of 15. Moves extremities x4. Sensation is grossly   intact without slurred speech, facial droop. HEENT: Normocephalic, atraumatic. PERRLA, EOMs intact. Sclerae   are anicteric. Conjunctivae clear. Nares are patent. Oropharynx clear. Tongue is midline, not edematous. NECK: Supple, without lymphadenopathy, JVD, carotid bruits, no   thyromegaly, no stridor, nontender. LYMPH: Negative for cervical or supraclavicular adenopathy. RESPIRATORY: Lungs clear to auscultation bilaterally. CARDIOVASCULAR: Heart is regular rate and rhythm, normal S1, S2,   without murmurs, rubs or gallops.    GASTROINTESTINAL: Abdomen is soft, tender to palpation in the left   lower quadrant. Voluntary guarding, no rebound, no rigidity. No   auscultated abdominal bruits. No palpable abdominal mass. BACK: No CVA tenderness. No step-off deformity. MUSCULOSKELETAL: No acute palpable bony deformity. Negative for   calf tenderness. VASCULAR: Radial 2+ and 1+ dorsalis pedis pulses, without cyanosis,   clubbing. Trace lower extremity nonpitting distal leg edema. SKIN: Warm and dry. LABORATORY DATA: I reviewed as follows: Sodium is 125,   potassium 4.3, chloride of 89, CO2 of 27, BUN of 3, creatinine 0.73,   glucose 136. Anion gap 9, calcium is 9.3, GFR greater than 60, total   bilirubin 0.3, total protein 7.6, albumin is 4.0. ALT of 37, AST 35,   alkaline phosphatase 51, lipase 200. WBC of 4.9, hemoglobin of 12.4,   hematocrit 36.5, platelets are 526, neutrophils of 53%. CT abdomen,   and pelvis with IV contrast was reviewed and noted as in HPI. KUB   showed unremarkable bowel gas pattern with L4 compression   deformity stable since 2016. IMPRESSION AND PLAN   A 79-year-old female with noted past medical history, now admitted   with acute hyponatremia. 1. Acute hyponatremia. Proceed toward slow correction of sodium. Admit the patient to medical floor. Place on neurovascular checks. Order repeat BNP tonight and recheck the sodium. Also order urine   sodium, urine osmolality. Check a urinalysis. 2. Abdominal distention. Currently resolved. 3. Nausea. Order Zofran 4 mg intravenous q.6 hours p.r.n.   4. Generalized weakness. Place on fall precautions, neurovascular   checks. 5. Left lower quadrant abdominal pain. The patient has no evidence of   residual diverticulitis. She is afebrile, white cell count is normal. The   patient had been taking both BACTRIM AND FLAGYL; however, per   chart records, she actually [address] per the   medication record. As such, no convincing evidence to continue on   either medications tonight. Continue with supportive cares. 6. Venous thromboembolism prophylaxis. Sequential compression   devices, bilateral lower extremities. FREDI Spear MD MP / HEBER   D:  05/16/2017   22:43   T:  05/16/2017   23:58   Job #:  [id number]

## 2017-07-07 ENCOUNTER — ANESTHESIA (OUTPATIENT)
Dept: ENDOSCOPY | Age: 77
End: 2017-07-07
Payer: MEDICARE

## 2017-07-07 ENCOUNTER — ANESTHESIA EVENT (OUTPATIENT)
Dept: ENDOSCOPY | Age: 77
End: 2017-07-07
Payer: MEDICARE

## 2017-07-07 ENCOUNTER — HOSPITAL ENCOUNTER (OUTPATIENT)
Age: 77
Setting detail: OUTPATIENT SURGERY
Discharge: HOME | End: 2017-07-07
Attending: COLON & RECTAL SURGERY | Admitting: COLON & RECTAL SURGERY
Payer: MEDICARE

## 2017-07-07 VITALS
HEART RATE: 68 BPM | WEIGHT: 128 LBS | SYSTOLIC BLOOD PRESSURE: 110 MMHG | BODY MASS INDEX: 23.41 KG/M2 | DIASTOLIC BLOOD PRESSURE: 64 MMHG | TEMPERATURE: 98 F | OXYGEN SATURATION: 98 % | RESPIRATION RATE: 21 BRPM

## 2017-07-07 PROCEDURE — 76060000031 HC ANESTHESIA FIRST 0.5 HR: Performed by: COLON & RECTAL SURGERY

## 2017-07-07 PROCEDURE — 77030027957 HC TBNG IRR ENDOGTR BUSS -B: Performed by: COLON & RECTAL SURGERY

## 2017-07-07 PROCEDURE — 88305 TISSUE EXAM BY PATHOLOGIST: CPT | Performed by: COLON & RECTAL SURGERY

## 2017-07-07 PROCEDURE — 76040000019: Performed by: COLON & RECTAL SURGERY

## 2017-07-07 PROCEDURE — 74011250636 HC RX REV CODE- 250/636

## 2017-07-07 PROCEDURE — 74011000250 HC RX REV CODE- 250

## 2017-07-07 RX ORDER — SODIUM CHLORIDE 0.9 % (FLUSH) 0.9 %
5-10 SYRINGE (ML) INJECTION AS NEEDED
Status: DISCONTINUED | OUTPATIENT
Start: 2017-07-07 | End: 2017-07-07 | Stop reason: HOSPADM

## 2017-07-07 RX ORDER — ATROPINE SULFATE 0.1 MG/ML
0.5 INJECTION INTRAVENOUS
Status: DISCONTINUED | OUTPATIENT
Start: 2017-07-07 | End: 2017-07-07 | Stop reason: HOSPADM

## 2017-07-07 RX ORDER — SODIUM CHLORIDE 9 MG/ML
50 INJECTION, SOLUTION INTRAVENOUS CONTINUOUS
Status: DISCONTINUED | OUTPATIENT
Start: 2017-07-07 | End: 2017-07-07 | Stop reason: HOSPADM

## 2017-07-07 RX ORDER — NALOXONE HYDROCHLORIDE 0.4 MG/ML
0.4 INJECTION, SOLUTION INTRAMUSCULAR; INTRAVENOUS; SUBCUTANEOUS
Status: DISCONTINUED | OUTPATIENT
Start: 2017-07-07 | End: 2017-07-07 | Stop reason: HOSPADM

## 2017-07-07 RX ORDER — EPINEPHRINE 0.1 MG/ML
1 INJECTION INTRACARDIAC; INTRAVENOUS
Status: DISCONTINUED | OUTPATIENT
Start: 2017-07-07 | End: 2017-07-07 | Stop reason: HOSPADM

## 2017-07-07 RX ORDER — LIDOCAINE HYDROCHLORIDE 20 MG/ML
INJECTION, SOLUTION EPIDURAL; INFILTRATION; INTRACAUDAL; PERINEURAL AS NEEDED
Status: DISCONTINUED | OUTPATIENT
Start: 2017-07-07 | End: 2017-07-07 | Stop reason: HOSPADM

## 2017-07-07 RX ORDER — PROPOFOL 10 MG/ML
INJECTION, EMULSION INTRAVENOUS AS NEEDED
Status: DISCONTINUED | OUTPATIENT
Start: 2017-07-07 | End: 2017-07-07 | Stop reason: HOSPADM

## 2017-07-07 RX ORDER — SODIUM CHLORIDE 0.9 % (FLUSH) 0.9 %
5-10 SYRINGE (ML) INJECTION EVERY 8 HOURS
Status: DISCONTINUED | OUTPATIENT
Start: 2017-07-07 | End: 2017-07-07 | Stop reason: HOSPADM

## 2017-07-07 RX ORDER — SODIUM CHLORIDE 9 MG/ML
INJECTION, SOLUTION INTRAVENOUS
Status: DISCONTINUED | OUTPATIENT
Start: 2017-07-07 | End: 2017-07-07 | Stop reason: HOSPADM

## 2017-07-07 RX ORDER — DEXTROMETHORPHAN/PSEUDOEPHED 2.5-7.5/.8
1.2 DROPS ORAL
Status: DISCONTINUED | OUTPATIENT
Start: 2017-07-07 | End: 2017-07-07 | Stop reason: HOSPADM

## 2017-07-07 RX ORDER — FLUMAZENIL 0.1 MG/ML
0.2 INJECTION INTRAVENOUS
Status: DISCONTINUED | OUTPATIENT
Start: 2017-07-07 | End: 2017-07-07 | Stop reason: HOSPADM

## 2017-07-07 RX ADMIN — PROPOFOL 20 MG: 10 INJECTION, EMULSION INTRAVENOUS at 11:26

## 2017-07-07 RX ADMIN — PROPOFOL 30 MG: 10 INJECTION, EMULSION INTRAVENOUS at 11:23

## 2017-07-07 RX ADMIN — LIDOCAINE HYDROCHLORIDE 40 MG: 20 INJECTION, SOLUTION EPIDURAL; INFILTRATION; INTRACAUDAL; PERINEURAL at 11:17

## 2017-07-07 RX ADMIN — PROPOFOL 20 MG: 10 INJECTION, EMULSION INTRAVENOUS at 11:29

## 2017-07-07 RX ADMIN — PROPOFOL 20 MG: 10 INJECTION, EMULSION INTRAVENOUS at 11:33

## 2017-07-07 RX ADMIN — PROPOFOL 20 MG: 10 INJECTION, EMULSION INTRAVENOUS at 11:20

## 2017-07-07 RX ADMIN — SODIUM CHLORIDE: 9 INJECTION, SOLUTION INTRAVENOUS at 11:03

## 2017-07-07 RX ADMIN — PROPOFOL 20 MG: 10 INJECTION, EMULSION INTRAVENOUS at 11:18

## 2017-07-07 RX ADMIN — PROPOFOL 70 MG: 10 INJECTION, EMULSION INTRAVENOUS at 11:17

## 2017-07-07 NOTE — IP AVS SNAPSHOT
[address] 
[phone] Patient: Reagan Meyer MRN: [id number] You are allergic to the following Allergen Reactions Ceftin (Cefuroxime Axetil) Unknown (comments) Clindamycin Anaphylaxis Ketek (Telithromycin) Anaphylaxis Levaquin (Levofloxacin) Anaphylaxis Methylprednisolone Rash Facial rash Nexium (Esomeprazole Magnesium) Anaphylaxis Other Medication Anaphylaxis Allergic reaction to allergy shots for dogs, cats, and birds Sudafed (Pseudoephedrine Hcl) Anaphylaxis Voltaren (Diclofenac Sodium) Other (comments) Mild throat closing and severe nausea Cortisone Rash Can take depro medrol if preservative free only Mobic (Meloxicam) Other (comments) Throat closing, severe nausea, abd pain and facial swelling Vibramycin (Doxycycline Calcium) Rash Facial rash Afrin (Pseudoephedrine Sulfate) Other (comments) Facial numbness that lasted 45 minutes during testing Antihistamine-1 Anaphylaxis Azithromycin Other (comments) \"neck stiffness & face numbness & swelling\" Bactrim (Sulfamethoprim Ds) Rash Facial rash and swelling Biaxin (Clarithromycin) Rash Severe facial rash and swelling Ciprofloxacin Other (comments) \"numbness and tingling in foot & leg\" Codeine Angioedema Doxycycline Anaphylaxis Flagyl (Metronidazole) Other (comments) Reports on the 7th day of taking it \"I had SEVERE Cranial pain that was not a headache\" Pcn (Penicillins) Anaphylaxis Restasis (Cyclosporine) Angioedema Xyzal (Levocetirizine) Anaphylaxis Recent Documentation Weight Breastfeeding? BMI OB Status Smoking Status 58.1 kg No 23.41 kg/m2 Postmenopausal Never Smoker Emergency Contacts Name Discharge Info Relation Home Work Mobile Lazara Harris DISCHARGE CAREGIVER [3] Child [2]   [phone] About your hospitalization You were admitted on:  July 7, 2017 You last received care in the:  [facility] ENDOSCOPY You were discharged on:  July 7, 2017 Unit phone number:  [phone] Why you were hospitalized Your primary diagnosis was:  Not on File Providers Seen During Your Hospitalizations Provider Role Specialty Primary office phone Yahaira Ambrocio MD Attending Provider Colon and Rectal Surgery [phone] Your Primary Care Physician (PCP) Primary Care Physician Office Phone Office Fax Jodi Hernandez [phone] Follow-up Information Follow up With Details Comments Contact Info Suzy Gillespie MD   [place] [address] 
[phone] Current Discharge Medication List  
  
CONTINUE these medications which have NOT CHANGED Dose & Instructions Dispensing Information Comments Morning Noon Evening Bedtime CENTRUM SILVER WOMEN PO Your last dose was: Your next dose is:    
   
   
 Dose:  1 Tab Take 1 Tab by mouth daily. Refills:  0  
     
   
   
   
  
 cholecalciferol (vitamin D3) 2,000 unit Tab Your last dose was: Your next dose is:    
   
   
 Dose:  1 Tab Take 1 Tab by mouth daily. Refills:  0  
     
   
   
   
  
 CITRACAL PO Your last dose was: Your next dose is:    
   
   
 Dose:  1 Tab Take 1 Tab by mouth two (2) times a day. Refills:  0  
     
   
   
   
  
 cyanocobalamin 500 mcg tablet Commonly known as:  VITAMIN B12 Your last dose was: Your next dose is:    
   
   
 Dose:  500 mcg Take 500 mcg by mouth daily. Refills:  0 Dexlansoprazole 60 mg Cpdb Commonly known as:  DEXILANT Your last dose was: Your next dose is: Dose:  60 mg Take 1 Cap by mouth daily. Quantity:  30 Cap Refills:  0  
     
   
   
   
  
 docusate sodium 100 mg capsule Commonly known as:  Natalia Mcgarry Your last dose was: Your next dose is:    
   
   
 Dose:  100 mg Take 100 mg by mouth two (2) times daily as needed. Refills:  0  
     
   
   
   
  
 FLONASE 50 mcg/actuation nasal spray Generic drug:  fluticasone Your last dose was: Your next dose is:    
   
   
 Dose:  2 Spray 2 Sprays by Both Nostrils route daily. Refills:  0  
     
   
   
   
  
 loteprednol etabonate 0.5 % ophthalmic suspension Commonly known as:  Rodriguez Mendoza Your last dose was: Your next dose is:    
   
   
 Dose:  1 Drop Administer 1 Drop to both eyes two (2) times a day. Refills:  0 MIRALAX 17 gram packet Generic drug:  polyethylene glycol Your last dose was: Your next dose is:    
   
   
 Dose:  17 g Take 17 g by mouth daily as needed. Refills:  0 Discharge Instructions Lily Herrera [id number] COLON DISCHARGE INSTRUCTIONS Discomfort: 
Redness at IV site- apply warm compress to area; if redness or soreness persist- contact your physician There may be a slight amount of blood passed from the rectum Gaseous discomfort- walking, belching will help relieve any discomfort You may not operate a vehicle for 12 hours You may not engage in an occupation involving machinery or appliances for rest of today You may not drink alcoholic beverages for at least 12 hours Avoid making any critical decisions for at least 24 hour DIET: 
 High fiber diet.  however -  remember your colon is empty and a heavy meal will produce gas. Avoid these foods:  vegetables, fried / greasy foods, carbonated drinks for today MEDICATIONS: 
Avoid blood thinners for one week ACTIVITY: 
 You may resume your normal daily activities it is recommended that you spend the remainder of the day resting -  avoid any strenuous activity. CALL M.D. ANY SIGN OF: Increasing pain, nausea, vomiting Abdominal distension (swelling) New increased bleeding (oral or rectal) Fever (chills) Pain in chest area Bloody discharge from nose or mouth Shortness of breath Follow-up Instructions: 
 Call Annie Staley MD if any questions or problems. Telephone # [phone] Biopsy results will be available in  7 to10 days Should have a repeat colonoscopy in 3 years. COLONOSCOPY FINDINGS: 
Your colonoscopy showed: diverticulosis, hemorrhoids, cecal polyp. Discharge Orders None Introducing [facility]! Allison Ye introduces [employer] patient portal. Now you can access parts of your medical record, email your doctor's office, and request medication refills online. 1. In your internet browser, go to [email or web address] 2. Click on the First Time User? Click Here link in the Sign In box. You will see the New Member Sign Up page. 3. Enter your [employer] Access Code exactly as it appears below. You will not need to use this code after youve completed the sign-up process. If you do not sign up before the expiration date, you must request a new code. · [employer] Access Code: [email or web address] Expires: 10/5/2017  9:52 AM 
 
4. Enter the last four digits of your Social Security Number (xxxx) and Date of Birth (mm/dd/yyyy) as indicated and click Submit. You will be taken to the next sign-up page. 5. Create a [employer] ID. This will be your [employer] login ID and cannot be changed, so think of one that is secure and easy to remember. 6. Create a [employer] password. You can change your password at any time. 7. Enter your Password Reset Question and Answer. This can be used at a later time if you forget your password. 8. Enter your e-mail address. You will receive e-mail notification when new information is available in [address] 9. Click Sign Up. You can now view and download portions of your medical record. 10. Click the Download Summary menu link to download a portable copy of your medical information. If you have questions, please visit the Frequently Asked Questions section of the [employer] website. Remember, [employer] is NOT to be used for urgent needs. For medical emergencies, dial 911. Now available from your iPhone and Android! General Information Please provide this summary of care documentation to your next provider. Patient Signature:  ____________________________________________________________ Date:  ____________________________________________________________  
  
Larri Hazard Provider Signature:  ____________________________________________________________ Date:  ____________________________________________________________

## 2017-07-07 NOTE — ROUTINE PROCESS
Amee Leigh  1940  [id number]    Situation:  Verbal report received from: Cheryl Stokes RN  Procedure: Procedure(s):  COLONOSCOPY    Background:    Preoperative diagnosis: HX OF POLYPS  Postoperative diagnosis: Diverticulosis  Cecal Polyp  Hemorrhoids    :  Dr. Jarrell Odonnell  Assistant(s): Endoscopy Technician-1: Constanza Rothman  Endoscopy RN-1: Eric Jones RN    Specimens:   ID Type Source Tests Collected by Time Destination   1 : cecal polyp Preservative Cecum  Ally Quezada MD 7/7/2017 1134 Pathology     H. Pylori  no    Assessment:  Intra-procedure medications     Anesthesia gave intra-procedure sedation and medications, see anesthesia flow sheet yes    Intravenous fluids: NS@ KVO     Vital signs stable     Abdominal assessment: round and soft     Recommendation:  Discharge patient per MD order.     Family or Friend   Permission to share finding with family or friend yes

## 2017-07-07 NOTE — H&P
Colon and Rectal Surgery History and Physical    Subjective: Lianet Dozier is a 68 y.o. female who has hx shakeel    Patient Active Problem List    Diagnosis Date Noted    Acute hyponatremia 05/16/2017    Multiple drug allergies     Environmental allergies     Osteoporosis 12/23/2013    GERD (gastroesophageal reflux disease) 12/23/2013     Past Medical History:   Diagnosis Date    Diverticulitis     Dystrophy, cornea     Environmental allergies     Family history of skin cancer     GERD (gastroesophageal reflux disease)     Hypotension     Multiple drug allergies     Osteoporosis     Sun-damaged skin     Vertebral compression fracture ([address] 01/05/12    Vocal cord anomaly     vocal cord dysfunction per speech therapist      Past Surgical History:   Procedure Laterality Date    HC BLD CARPEL TUNNEL RELEASE      HX CATARACT REMOVAL  07/2012    HX GYN      D&C    HX MOHS PROCEDURES  04/11/2017    BCC left cheek by Dr. Albright Score History   Substance Use Topics    Smoking status: Never Smoker    Smokeless tobacco: Never Used    Alcohol use No      Family History   Problem Relation Age of Onset    Heart Disease Mother     Cancer Father 61     lung    Cataract Daughter      congenital cataracts    Heart Disease Daughter      fast heart rythmn      Prior to Admission medications    Medication Sig Start Date End Date Taking? Authorizing Provider   CALCIUM CITRATE (CITRACAL PO) Take 1 Tab by mouth two (2) times a day. Historical Provider   polyethylene glycol (MIRALAX) 17 gram packet Take 17 g by mouth daily as needed. Historical Provider   docusate sodium (COLACE) 100 mg capsule Take 100 mg by mouth two (2) times daily as needed. Historical Provider   MULTIVIT-MIN/IRON/FOLIC/LUTEIN (CENTRUM SILVER WOMEN PO) Take 1 Tab by mouth daily. Historical Provider   fluticasone (FLONASE) 50 mcg/actuation nasal spray 2 Sprays by Both Nostrils route daily.     Cheng Gonzalez MD cholecalciferol, vitamin D3, 2,000 unit tab Take 1 Tab by mouth daily. Historical Provider   cyanocobalamin (VITAMIN B12) 500 mcg tablet Take 500 mcg by mouth daily. Historical Provider   Dexlansoprazole (DEXILANT) 60 mg CpDB Take 1 Cap by mouth daily. 2/29/16   Leonardo Shah MD   loteprednol etabonate (LOTEMAX) 0.5 % ophthalmic suspension Administer 1 Drop to both eyes two (2) times a day.     Historical Provider     Allergies   Allergen Reactions    Clindamycin Anaphylaxis    Ketek [Telithromycin] Anaphylaxis    Levaquin [Levofloxacin] Anaphylaxis    Methylprednisolone Rash     Facial rash    Nexium [Esomeprazole Magnesium] Anaphylaxis    Other Medication Anaphylaxis     Allergic reaction to allergy shots for dogs, cats, and birds    Voltaren [Diclofenac Sodium] Other (comments)     Mild throat closing and severe nausea    Cortisone Rash     Can take depro medrol if preservative free only    Mobic [Meloxicam] Other (comments)     Throat closing, severe nausea, abd pain and facial swelling    Vibramycin [Doxycycline Calcium] Rash     Facial rash    Afrin [Pseudoephedrine Sulfate] Other (comments)     Facial numbness that lasted 45 minutes during testing    Antihistamine-1 Anaphylaxis    Azithromycin Other (comments)     \"neck stiffness & face numbness & swelling\"    Bactrim [Sulfamethoprim Ds] Rash     Facial rash and swelling    Biaxin [Clarithromycin] Rash     Severe facial rash and swelling    Ceftin [Cefuroxime Axetil] Unknown (comments)    Ciprofloxacin Other (comments)     \"numbness and tingling in foot & leg\"    Codeine Angioedema    Doxycycline Anaphylaxis    Flagyl [Metronidazole] Other (comments)     Reports on the 7th day of taking it \"I had SEVERE Cranial pain that was not a headache\"     Pcn [Penicillins] Anaphylaxis    Restasis [Cyclosporine] Angioedema    Xyzal [Levocetirizine] Anaphylaxis        Review of Systems:    A comprehensive review of systems was negative except for that written in the History of Present Illness. Objective: There were no vitals taken for this visit. Physical Exam:   nad  Chest clear  Heart reg  abd soft    Imaging:  images and reports reviewed    Lab Review:  No results found for this or any previous visit (from the past 24 hour(s)). Labs and radiology: images and reports reviewed      Assessment:     Hx shakeel  Last tc 2007  Plan:     1. I recommend proceeding with col. Treatment alternatives were discussed. 2. Discussed aspects of surgical intervention, methods, risks (including by not limited to infection, bleeding, hematoma, and perforation of the intestines or solid organs), and the risks of general anesthetic. The patient understands the risks; any and all questions were answered to the patient's satisfaction.     Signed By: Fco Castellanos MD     July 6, 2017

## 2017-07-07 NOTE — ANESTHESIA POSTPROCEDURE EVALUATION
Post-Anesthesia Evaluation and Assessment    Patient: Ayad Ross MRN: [id number]  SSN: [id number]    YOB: 1940  Age: 68 y.o. Sex: female       Cardiovascular Function/Vital Signs  Visit Vitals    /77    Pulse 67    Temp 36.7 °C (98 °F)    Resp 18    Wt 58.1 kg (128 lb)    SpO2 100%    Breastfeeding No    BMI 23.41 kg/m2       Patient is status post MAC anesthesia for Procedure(s):  COLONOSCOPY. Nausea/Vomiting: None    Postoperative hydration reviewed and adequate. Pain:  Pain Scale 1: Numeric (0 - 10) (07/07/17 1145)  Pain Intensity 1: 10 (07/07/17 1145)   Managed    Neurological Status: At baseline    Mental Status and Level of Consciousness: Arousable    Pulmonary Status:   O2 Device: Room air (07/07/17 1145)   Adequate oxygenation and airway patent    Complications related to anesthesia: None    Post-anesthesia assessment completed.  No concerns    Signed By: Allene Ormond, MD     July 7, 2017

## 2017-07-07 NOTE — DISCHARGE INSTRUCTIONS
Inocencio Muñoz  [id number]  1940    COLON DISCHARGE INSTRUCTIONS  Discomfort:  Redness at IV site- apply warm compress to area; if redness or soreness persist- contact your physician  There may be a slight amount of blood passed from the rectum  Gaseous discomfort- walking, belching will help relieve any discomfort  You may not operate a vehicle for 12 hours  You may not engage in an occupation involving machinery or appliances for rest of today  You may not drink alcoholic beverages for at least 12 hours  Avoid making any critical decisions for at least 24 hour  DIET:   High fiber diet. - however -  remember your colon is empty and a heavy meal will produce gas. Avoid these foods:  vegetables, fried / greasy foods, carbonated drinks for today    MEDICATIONS:  Avoid blood thinners for one week       ACTIVITY:  You may resume your normal daily activities it is recommended that you spend the remainder of the day resting -  avoid any strenuous activity. CALL M.D. ANY SIGN OF:   Increasing pain, nausea, vomiting  Abdominal distension (swelling)  New increased bleeding (oral or rectal)  Fever (chills)  Pain in chest area  Bloody discharge from nose or mouth  Shortness of breath     Follow-up Instructions:   Call Juana Lorenzana MD if any questions or problems. Telephone # [phone]  Biopsy results will be available in  7 to10 days  Should have a repeat colonoscopy in 3 years. COLONOSCOPY FINDINGS:  Your colonoscopy showed: diverticulosis, hemorrhoids, cecal polyp.

## 2017-07-07 NOTE — BRIEF OP NOTE
BRIEF OPERATIVE NOTE    Date of Procedure: 7/7/2017   Preoperative Diagnosis: HX OF POLYPS  Postoperative Diagnosis: Diverticulosis  Cecal Polyp  Hemorrhoids    Procedure(s):  COLONOSCOPY  Surgeon(s) and Role:     * Tanvi Duarte MD - Primary         Assistant Staff:       Surgical Staff:  Endoscopy Technician-1: Alka Callejas  Endoscopy RN-1: Ilda Chester RN  No case tracking events are documented in the log.   Anesthesia: MAC   Estimated Blood Loss: none  Specimens:   ID Type Source Tests Collected by Time Destination   1 : cecal polyp Preservative Cecum  Tanvi Duarte MD 7/7/2017 1134 Pathology      Findings: cecal polyp, ascending colon and sigmoid diverticulosis   Complications: none apparent  Implants: * No implants in log *

## 2017-07-07 NOTE — PERIOP NOTES

## 2017-07-07 NOTE — ANESTHESIA PREPROCEDURE EVALUATION
Anesthetic History   No history of anesthetic complications            Review of Systems / Medical History  Patient summary reviewed, nursing notes reviewed and pertinent labs reviewed    Pulmonary  Within defined limits                 Neuro/Psych   Within defined limits           Cardiovascular  Within defined limits                     GI/Hepatic/Renal     GERD           Endo/Other  Within defined limits           Other Findings              Physical Exam    Airway  Mallampati: II  TM Distance: > 6 cm  Neck ROM: normal range of motion   Mouth opening: Normal     Cardiovascular  Regular rate and rhythm,  S1 and S2 normal,  no murmur, click, rub, or gallop             Dental  No notable dental hx       Pulmonary  Breath sounds clear to auscultation               Abdominal  GI exam deferred       Other Findings            Anesthetic Plan    ASA: 2  Anesthesia type: MAC            Anesthetic plan and risks discussed with: Patient

## 2017-07-07 NOTE — OP NOTES
Colonoscopy Procedure Note    Indications: Previous adenomatous polyp    Anesthesia/Sedation: MAC anesthesia Propofol    Pre-Procedure Exam:  Airway: clear   Heart: normal S1and S2    Lungs: clear bilateral  Abdomen: soft, nontender, bowel sounds present and normal in all quadrants   Mental Status: awake, alert, and oriented to person, place, and time      Procedure in Detail:  Informed consent was obtained for the procedure, including sedation. Risks of perforation, hemorrhage, adverse drug reaction, and aspiration were discussed. The patient was placed in the left lateral decubitus position. Based on the pre-procedure assessment, including review of the patient's medical history, medications, allergies, and review of systems, she had been deemed to be an appropriate candidate for moderate sedation; she was therefore sedated with the medications listed above. The patient was monitored continuously with ECG tracing, pulse oximetry, blood pressure monitoring, and direct observations. A rectal examination was performed. The [id number] was inserted into the rectum and advanced under direct vision to the cecum, which was identified by the ileocecal valve and appendiceal orifice. The quality of the colonic preparation was excellent. A careful inspection was made as the colonoscope was withdrawn, including a retroflexed view of the rectum; findings and interventions are described below. Appropriate photodocumentation was obtained. Findings:   Rectum:     - Protruding lesions:     -Internal Hemorrhoids  Sigmoid:     - Excavated lesions:     - Diverticulosis  Descending Colon:   Normal  Transverse Colon:   Normal  Ascending Colon:     - Excavated lesions:     - Diverticulosis  Cecum:     - Protruding lesions:     -Pedunculated Polyp(s) size 7 mm removed by polypectomy (hot biopsy)            Specimens: Specimens were collected and sent to pathology.        EBL: None    Complications: None; patient tolerated the procedure well. Attending Attestation: I performed the procedure. Recommendations:   - Repeat colonoscopy in 3 years.     Signed By: Danielle Britt MD                        July 7, 2017

## 2017-07-11 ENCOUNTER — HOSPITAL ENCOUNTER (OUTPATIENT)
Dept: GENERAL RADIOLOGY | Age: 77
Discharge: HOME | End: 2017-07-11
Attending: INTERNAL MEDICINE
Payer: MEDICARE

## 2017-07-11 DIAGNOSIS — R10.9 ABDOMINAL DISCOMFORT: ICD-10-CM

## 2017-07-11 PROCEDURE — 74020 XR ABD (AP AND ERECT OR DECUB): CPT

## 2017-08-29 ENCOUNTER — HOSPITAL ENCOUNTER (OUTPATIENT)
Dept: MRI IMAGING | Age: 77
Discharge: HOME | End: 2017-08-29
Attending: ORTHOPAEDIC SURGERY
Payer: MEDICARE

## 2017-08-29 DIAGNOSIS — M54.50 ACUTE MIDLINE LOW BACK PAIN WITHOUT SCIATICA: ICD-10-CM

## 2017-08-29 DIAGNOSIS — S32.000A COMPRESSION FX, LUMBAR SPINE (HCC): ICD-10-CM

## 2017-08-29 DIAGNOSIS — M51.26 HNP (HERNIATED NUCLEUS PULPOSUS), LUMBAR: ICD-10-CM

## 2017-08-29 DIAGNOSIS — M53.3 COCCYODYNIA: ICD-10-CM

## 2017-08-29 PROCEDURE — 72195 MRI PELVIS W/O DYE: CPT

## 2017-08-29 PROCEDURE — 72148 MRI LUMBAR SPINE W/O DYE: CPT

## 2018-01-22 ENCOUNTER — HOSPITAL ENCOUNTER (OUTPATIENT)
Dept: GENERAL RADIOLOGY | Age: 78
Discharge: HOME | End: 2018-01-22
Attending: INTERNAL MEDICINE
Payer: MEDICARE

## 2018-01-22 DIAGNOSIS — R05.9 COUGH: ICD-10-CM

## 2018-01-22 PROCEDURE — 71046 X-RAY EXAM CHEST 2 VIEWS: CPT

## 2018-08-14 ENCOUNTER — TELEPHONE (OUTPATIENT)
Dept: CARDIOLOGY CLINIC | Age: 78
End: 2018-08-14

## 2018-08-15 ENCOUNTER — TELEPHONE (OUTPATIENT)
Dept: CARDIOLOGY CLINIC | Age: 78
End: 2018-08-15

## 2018-08-15 NOTE — TELEPHONE ENCOUNTER
Pt called following up a conversation she had with Dr. Saran Renee (on call yesterday), requesting she be seen today, due to the need to evaluate an episode of chest pain.   Phone # [phone]  Thanks

## 2018-08-15 NOTE — TELEPHONE ENCOUNTER
Verified patient with two patient identifiers. Spoke with patient and appointment given to see  tomorrow for chest pain.

## 2018-08-16 ENCOUNTER — OFFICE VISIT (OUTPATIENT)
Dept: CARDIOLOGY CLINIC | Age: 78
End: 2018-08-16

## 2018-08-16 VITALS
SYSTOLIC BLOOD PRESSURE: 120 MMHG | BODY MASS INDEX: 24.51 KG/M2 | HEIGHT: 62 IN | HEART RATE: 60 BPM | DIASTOLIC BLOOD PRESSURE: 80 MMHG | WEIGHT: 133.2 LBS | OXYGEN SATURATION: 97 %

## 2018-08-16 DIAGNOSIS — I45.10 RBBB: ICD-10-CM

## 2018-08-16 DIAGNOSIS — K21.9 GASTROESOPHAGEAL REFLUX DISEASE WITHOUT ESOPHAGITIS: ICD-10-CM

## 2018-08-16 DIAGNOSIS — I20.0 UNSTABLE ANGINA (HCC): Primary | ICD-10-CM

## 2018-08-16 DIAGNOSIS — R07.9 CHEST PAIN, UNSPECIFIED TYPE: ICD-10-CM

## 2018-08-16 DIAGNOSIS — G54.0 THORACIC OUTLET SYNDROME: ICD-10-CM

## 2018-08-16 NOTE — MR AVS SNAPSHOT
[address] [place] 
[phone] Patient: Pito Hope MRN: [id number] DV Visit Information Date & Time Provider Department Dept. Phone Encounter #  
 2018  9:40 AM Shar Burgess MD [facility] [phone] [id number] Your Appointments 2018 12:00 PM  
NUCLEAR MEDICINE with NUCLEAR, MICK [facility] ([facility] SCHEDULING) Appt Note: 1 day Lexiscan ht 5'2 wt 133 12:00 echo 2:00 both for unstable angina per Dr. Ever Britt [address] [place]  
[address] [id number]  [phone]  
  
    
 2018  2:00 PM  
ECHO CARDIOGRAMS 2D with VASCULAR, MICK [facility] ([facility] SCHEDULING) Appt Note: 1 day Lexiscan ht 5'2 wt 133 12:00 echo 2:00 both for unstable angina per Dr. Ever Britt [address] [place]  
[phone]  
  
   
 [address] [place]  
  
    
 2018  1:40 PM  
ESTABLISHED PATIENT with Doni Narayanan MD  
[facility] ([facility]) Appt Note: needs appt evaluate an episode of chest pain; needs appt evaluate an episode of chest pain also f/u testing done on  per Dr. Ever Britt [address] [place]  
[address] [facility],[address] [facility] 7 [address] Upcoming Health Maintenance Date Due DTaP/Tdap/Td series (1 - Tdap) 1961 ZOSTER VACCINE AGE 60> 2000 GLAUCOMA SCREENING Q2Y 2005 Influenza Age 5 to Adult 2018 MEDICARE YEARLY EXAM 10/11/2018 COLONOSCOPY 2020 Allergies as of 2018  Review Complete On: 2018 By: Shar Burgess MD  
  
 Severity Noted Reaction Type Reactions Ceftin [Cefuroxime Axetil] High 2012    Unknown (comments) Clindamycin High 11/09/2015    Anaphylaxis Ketek [Telithromycin] High 03/01/2011    Anaphylaxis Levaquin [Levofloxacin] High 11/09/2015    Anaphylaxis Methylprednisolone High 11/09/2015    Rash Facial rash Nexium [Esomeprazole Magnesium] High 11/09/2015    Anaphylaxis Other Medication High 11/09/2015    Anaphylaxis Allergic reaction to allergy shots for dogs, cats, and birds Sudafed [Pseudoephedrine Hcl] High 07/07/2017    Anaphylaxis Voltaren [Diclofenac Sodium] High 11/09/2015    Other (comments) Mild throat closing and severe nausea Cortisone Medium 11/09/2015    Rash Can take depro medrol if preservative free only Mobic [Meloxicam] Medium 11/09/2015    Other (comments) Throat closing, severe nausea, abd pain and facial swelling Vibramycin [Doxycycline Calcium] Medium 11/09/2015    Rash Facial rash Afrin [Pseudoephedrine Sulfate]  11/09/2015    Other (comments) Facial numbness that lasted 45 minutes during testing Antihistamine-1  03/01/2011    Anaphylaxis Azithromycin  07/24/2012    Other (comments) \"neck stiffness & face numbness & swelling\" Bactrim [Sulfamethoprim Ds]  11/09/2015    Rash Facial rash and swelling Biaxin [Clarithromycin]  11/09/2015    Rash Severe facial rash and swelling Ciprofloxacin  07/24/2012    Other (comments) \"numbness and tingling in foot & leg\" Codeine  07/24/2012    Angioedema Doxycycline  03/01/2011    Anaphylaxis Flagyl [Metronidazole]  12/21/2016    Other (comments) Reports on the 7th day of taking it \"I had SEVERE Cranial pain that was not a headache\" Pcn [Penicillins]  03/01/2011    Anaphylaxis Restasis [Cyclosporine]  07/24/2012    Angioedema Xyzal [Levocetirizine]  03/01/2011    Anaphylaxis Current Immunizations  Reviewed on 11/4/2016 Name Date Influenza High Dose Vaccine PF 9/25/2016 Influenza Vaccine 10/10/2017 Pneumococcal Conjugate (PCV-13) 1/6/2017 Pneumococcal Polysaccharide (PPSV-23) 1/1/2010 Not reviewed this visit You Were Diagnosed With   
  
 Codes Comments Unstable angina (HCC)    -  Primary ICD-10-CM: I20.0 ICD-9-CM: 411.1 Chest pain, unspecified type     ICD-10-CM: R07.9 ICD-9-CM: 786.50 Thoracic outlet syndrome     ICD-10-CM: G54.0 ICD-9-CM: 353.0 Gastroesophageal reflux disease without esophagitis     ICD-10-CM: K21.9 ICD-9-CM: 530.81 RBBB     ICD-10-CM: I45.10 ICD-9-CM: 426.4 Vitals BP Pulse Height(growth percentile) Weight(growth percentile) SpO2 BMI  
 120/80 (BP 1 Location: Right arm, BP Patient Position: Supine) 60 5' 2\" (1.575 m) 133 lb 3.2 oz (60.4 kg) 97% 24.36 kg/m2 OB Status Smoking Status Postmenopausal Never Smoker Vitals History BMI and BSA Data Body Mass Index Body Surface Area  
 24.36 kg/m 2 1.63 m 2 Preferred Pharmacy Pharmacy Name Phone [id number] [phone] Your Updated Medication List  
  
   
This list is accurate as of 8/16/18 11:00 AM.  Always use your most recent med list.  
  
  
  
  
 CENTRUM SILVER WOMEN PO Take 1 Tab by mouth daily. cholecalciferol (vitamin D3) 2,000 unit Tab Take 1 Tab by mouth daily. CITRACAL PO Take 1 Tab by mouth two (2) times a day. cyanocobalamin 500 mcg tablet Commonly known as:  VITAMIN B12 Take 500 mcg by mouth daily. Dexlansoprazole 60 mg Cpdb Commonly known as:  DEXILANT Take 1 Cap by mouth daily. docusate sodium 100 mg capsule Commonly known as:  Luwana Lala Take 100 mg by mouth two (2) times daily as needed. FLONASE 50 mcg/actuation nasal spray Generic drug:  fluticasone 2 Sprays by Both Nostrils route daily. loteprednol etabonate 0.5 % ophthalmic suspension Commonly known as:  Sussy Hurl Administer 1 Drop to both eyes two (2) times a day. MIRALAX 17 gram packet Generic drug:  polyethylene glycol Take 17 g by mouth daily as needed. We Performed the Following AMB POC EKG ROUTINE W/ 12 LEADS, INTER & REP [53813 CPT(R)] To-Do List   
 08/16/2018 ECG:  STRESS TEST CARDIOLITE Around 08/21/2018 ECHO:  2D ECHO COMPLETE ADULT (TTE) W OR WO CONTR   
  
 08/23/2018 10:00 AM  
  Appointment with [facility] JANE 2 at [address] [place] [address] ([phone]) Shower or bathe using soap and water. Do not use deodorant, powder, perfumes, or lotion the day of your exam.  If your prior mammograms were not performed at [facility] please bring films with you or forward prior images 2 days before your procedure. Check in at registration 15min before your appointment time unless you were instructed to do otherwise. A script is not necessary for screening. If you have one, please bring it on the day of the mammogram or have it faxed to the department. [facility]  [phone] [facility] [phone] \[facility] [place] [facility]\"" [phone] [facility] [phone] Introducing [facility] & HEALTH SERVICES! Sheila Andrea introduces Select[employer] patient portal. Now you can access parts of your medical record, email your doctor's office, and request medication refills online. 1. In your internet browser, go to [email or web address] 2. Click on the First Time User? Click Here link in the Sign In box. You will see the New Member Sign Up page. 3. Enter your SelectHub Access Code exactly as it appears below. You will not need to use this code after youve completed the sign-up process. If you do not sign up before the expiration date, you must request a new code. · SelectHub Access Code: [email or web address] Expires: 9/10/2018  3:27 PM 
 
4. Enter the last four digits of your Social Security Number (xxxx) and Date of Birth (mm/dd/yyyy) as indicated and click Submit. You will be taken to the next sign-up page. 5. Create a MyChart ID.  This will be your MyChart login ID and cannot be changed, so think of one that is secure and easy to remember. 6. Create a Black Card Media password. You can change your password at any time. 7. Enter your Password Reset Question and Answer. This can be used at a later time if you forget your password. 8. Enter your e-mail address. You will receive e-mail notification when new information is available in [address] 9. Click Sign Up. You can now view and download portions of your medical record. 10. Click the Download Summary menu link to download a portable copy of your medical information. If you have questions, please visit the Frequently Asked Questions section of the [employer] website. Remember, [employer] is NOT to be used for urgent needs. For medical emergencies, dial 911. Now available from your iPhone and Android! Please provide this summary of care documentation to your next provider. Your primary care clinician is listed as Peter. If you have any questions after today's visit, please call [phone].

## 2018-08-16 NOTE — PROGRESS NOTES
[address]: Sam Handley  [phone]      History of Present Illness:   Ms. Honey Hope is a 67 yo F pt of Dr. Taylor Nicole with a history of thoracic outlet syndrome followed by neurology, Dr. Leo Chavira, gastroesophageal reflux, referred by Dr. Mary Grace Corbett for cardiac evaluation. She is here due to recent chest pain. This happened Monday night when she laid down to sleep she felt a sudden severe pain on the left side of her chest, left shoulder radiating into her jaw that persisted several hours. She says this got better when she stood up, but when she laid down this would get worse again. She says usually her right arm has severe pain, but when the left sided pains came on, the right side went away. She did have some associated shortness of breath. Since Monday, this has not recurred. She is compensated on exam with clear lungs and no lower extremity edema. Her EKG is normal sinus rhythm, heart rate of 60, RBBB unchanged compared to prior. Assessment and Plan:   1. Unstable angina. Chest pain and shortness of breath with typical and atypical features; will proceed with an echocardiogram and stress test for further evaluation. She cannot complete a treadmill fully and will do this Lexiscan. At least part of her pains are positional and reproducible with palpation and noncardiac. She will follow up with Dr. Taylor Nicole to discuss the results after. 2. Thoracic outlet syndrome. Followed by neurology. 3. Right bundle-branch block. She  has a past medical history of Diverticulitis; Dystrophy, cornea; Environmental allergies; Family history of skin cancer; GERD (gastroesophageal reflux disease); Hypotension; Ill-defined condition; Multiple drug allergies; Osteoporosis; Sun-damaged skin; Vertebral compression fracture (HCC) (01/05/12); and Vocal cord anomaly. She also has no past medical history of Arsenic suspected exposure; Radiation exposure; Skin cancer;  Sunburn, blistering; or Tanning bed exposure. All other systems negative except as above. PE  Vitals:    08/16/18 1030   BP: 120/80   Pulse: 60   SpO2: 97%   Weight: 133 lb 3.2 oz (60.4 kg)   Height: 5' 2\" (1.575 m)    Body mass index is 24.36 kg/(m^2).    General appearance - alert, well appearing, and in no distress  Mental status - affect appropriate to mood  Eyes - sclera anicteric, moist mucous membranes  Neck - supple, no JVD  Chest - clear to auscultation, no wheezes, rales or rhonchi  Heart - normal rate, regular rhythm, normal S1, S2, I/VI systolic murmur LUSB  Abdomen - soft, nontender, nondistended, no masses or organomegaly  Neurological -no focal deficit  Extremities - peripheral pulses normal, no pedal edema      Recent Labs:  No results found for: CHOL, CHOLX, CHLST, CHOLV, 330675, HDL, LDL, LDLC, DLDLP, TGLX, TRIGL, TRIGP, CHHD, CHHDX  Lab Results   Component Value Date/Time    Creatinine (POC) 0.6 05/08/2017 04:09 PM    Creatinine 0.60 02/06/2018 11:49 AM     Lab Results   Component Value Date/Time    BUN 8 02/06/2018 11:49 AM     Lab Results   Component Value Date/Time    Potassium 4.7 02/06/2018 11:49 AM     No results found for: HBA1C, HGBE8, KFT5EMDS  Lab Results   Component Value Date/Time    HGB (POC) 13.8 02/06/2018 11:53 AM    HGB 12.2 05/17/2017 03:58 AM     Lab Results   Component Value Date/Time    PLATELET 311 82/72/1421 03:58 AM       Reviewed:  Past Medical History:   Diagnosis Date    Diverticulitis     Dystrophy, cornea     Environmental allergies     Family history of skin cancer     GERD (gastroesophageal reflux disease)     Hypotension     Ill-defined condition     Hyponatremia    Multiple drug allergies     Osteoporosis     Sun-damaged skin     Vertebral compression fracture ([facility] [address] 01/05/12    Vocal cord anomaly     vocal cord dysfunction per speech therapist     History   Smoking Status    Never Smoker   Smokeless Tobacco    Never Used     History   Alcohol Use No     Allergies   Allergen Reactions    Ceftin [Cefuroxime Axetil] Unknown (comments)    Clindamycin Anaphylaxis    Ketek [Telithromycin] Anaphylaxis    Levaquin [Levofloxacin] Anaphylaxis    Methylprednisolone Rash     Facial rash    Nexium [Esomeprazole Magnesium] Anaphylaxis    Other Medication Anaphylaxis     Allergic reaction to allergy shots for dogs, cats, and birds    Sudafed [Pseudoephedrine Hcl] Anaphylaxis    Voltaren [Diclofenac Sodium] Other (comments)     Mild throat closing and severe nausea    Cortisone Rash     Can take depro medrol if preservative free only    Mobic [Meloxicam] Other (comments)     Throat closing, severe nausea, abd pain and facial swelling    Vibramycin [Doxycycline Calcium] Rash     Facial rash    Afrin [Pseudoephedrine Sulfate] Other (comments)     Facial numbness that lasted 45 minutes during testing    Antihistamine-1 Anaphylaxis    Azithromycin Other (comments)     \"neck stiffness & face numbness & swelling\"    Bactrim [Sulfamethoprim Ds] Rash     Facial rash and swelling    Biaxin [Clarithromycin] Rash     Severe facial rash and swelling    Ciprofloxacin Other (comments)     \"numbness and tingling in foot & leg\"    Codeine Angioedema    Doxycycline Anaphylaxis    Flagyl [Metronidazole] Other (comments)     Reports on the 7th day of taking it \"I had SEVERE Cranial pain that was not a headache\"     Pcn [Penicillins] Anaphylaxis    Restasis [Cyclosporine] Angioedema    Xyzal [Levocetirizine] Anaphylaxis       Current Outpatient Prescriptions   Medication Sig    CALCIUM CITRATE (CITRACAL PO) Take 1 Tab by mouth two (2) times a day.  polyethylene glycol (MIRALAX) 17 gram packet Take 17 g by mouth daily as needed.  docusate sodium (COLACE) 100 mg capsule Take 100 mg by mouth two (2) times daily as needed.  MULTIVIT-MIN/IRON/FOLIC/LUTEIN (CENTRUM SILVER WOMEN PO) Take 1 Tab by mouth daily.  cholecalciferol, vitamin D3, 2,000 unit tab Take 1 Tab by mouth daily.     cyanocobalamin (VITAMIN B12) 500 mcg tablet Take 500 mcg by mouth daily.  Dexlansoprazole (DEXILANT) 60 mg CpDB Take 1 Cap by mouth daily.  loteprednol etabonate (LOTEMAX) 0.5 % ophthalmic suspension Administer 1 Drop to both eyes two (2) times a day.  fluticasone (FLONASE) 50 mcg/actuation nasal spray 2 Sprays by Both Nostrils route daily. No current facility-administered medications for this visit.         Wily Constantino MD  [facility] [place]  [address] [facility]  [address]

## 2018-08-23 ENCOUNTER — HOSPITAL ENCOUNTER (OUTPATIENT)
Dept: MAMMOGRAPHY | Age: 78
Discharge: HOME | End: 2018-08-23
Attending: INTERNAL MEDICINE
Payer: MEDICARE

## 2018-08-23 DIAGNOSIS — Z12.31 VISIT FOR SCREENING MAMMOGRAM: ICD-10-CM

## 2018-08-23 PROCEDURE — 77063 BREAST TOMOSYNTHESIS BI: CPT

## 2018-09-04 ENCOUNTER — CLINICAL SUPPORT (OUTPATIENT)
Dept: CARDIOLOGY CLINIC | Age: 78
End: 2018-09-04

## 2018-09-04 DIAGNOSIS — G54.0 THORACIC OUTLET SYNDROME: ICD-10-CM

## 2018-09-04 DIAGNOSIS — I20.0 UNSTABLE ANGINA (HCC): ICD-10-CM

## 2018-09-04 DIAGNOSIS — I45.10 RBBB: ICD-10-CM

## 2018-09-04 DIAGNOSIS — R07.9 CHEST PAIN, UNSPECIFIED TYPE: ICD-10-CM

## 2018-09-04 DIAGNOSIS — R06.02 SHORTNESS OF BREATH: ICD-10-CM

## 2018-09-04 DIAGNOSIS — R94.31 ABNORMAL EKG: ICD-10-CM

## 2018-09-04 DIAGNOSIS — K21.9 GASTROESOPHAGEAL REFLUX DISEASE WITHOUT ESOPHAGITIS: ICD-10-CM

## 2018-09-04 NOTE — PROGRESS NOTES
See scanned document. Ordering Doctor:Dr. Jose Alberto Crawley  Reading Doctor:Dr. Zach Reich    Patient tolerated procedure well.

## 2018-09-07 ENCOUNTER — OFFICE VISIT (OUTPATIENT)
Dept: CARDIOLOGY CLINIC | Age: 78
End: 2018-09-07

## 2018-09-07 VITALS
HEART RATE: 66 BPM | DIASTOLIC BLOOD PRESSURE: 60 MMHG | RESPIRATION RATE: 16 BRPM | HEIGHT: 62 IN | SYSTOLIC BLOOD PRESSURE: 100 MMHG | WEIGHT: 134.8 LBS | OXYGEN SATURATION: 98 % | BODY MASS INDEX: 24.8 KG/M2

## 2018-09-07 DIAGNOSIS — G54.0 THORACIC OUTLET SYNDROME: ICD-10-CM

## 2018-09-07 DIAGNOSIS — I45.10 RBBB: ICD-10-CM

## 2018-09-07 DIAGNOSIS — R07.9 CHEST PAIN, UNSPECIFIED TYPE: Primary | ICD-10-CM

## 2018-09-07 DIAGNOSIS — K21.9 GASTROESOPHAGEAL REFLUX DISEASE WITHOUT ESOPHAGITIS: ICD-10-CM

## 2018-09-07 RX ORDER — TRAMADOL HYDROCHLORIDE 50 MG/1
50 TABLET ORAL AS NEEDED
COMMUNITY
Start: 2018-09-07 | End: 2019-04-26 | Stop reason: SDUPTHER

## 2018-09-07 NOTE — MR AVS SNAPSHOT
[address] [facility] 
[phone] Patient: Ralene Sicard MRN: [id number] Visit Information Date & Time Provider Department Dept. Phone Encounter #  
 9/7/2018  1:40 PM Terrance Boo MD [facility] [place] [phone] [id number] Follow-up Instructions Return in about 6 months (around 3/7/2019). Follow-up and Disposition History Upcoming Health Maintenance Date Due DTaP/Tdap/Td series (1 - Tdap) 4/28/1961 ZOSTER VACCINE AGE 60> 2/28/2000 GLAUCOMA SCREENING Q2Y 4/28/2005 Influenza Age 5 to Adult 8/1/2018 MEDICARE YEARLY EXAM 10/11/2018 COLONOSCOPY 7/7/2020 Allergies as of 9/7/2018  Review Complete On: 9/7/2018 By: Terrance Boo MD  
  
 Severity Noted Reaction Type Reactions Ceftin [Cefuroxime Axetil] High 07/24/2012    Unknown (comments) Clindamycin High 11/09/2015    Anaphylaxis Ketek [Telithromycin] High 03/01/2011    Anaphylaxis Levaquin [Levofloxacin] High 11/09/2015    Anaphylaxis Methylprednisolone High 11/09/2015    Rash Facial rash Nexium [Esomeprazole Magnesium] High 11/09/2015    Anaphylaxis Other Medication High 11/09/2015    Anaphylaxis Allergic reaction to allergy shots for dogs, cats, and birds Sudafed [Pseudoephedrine Hcl] High 07/07/2017    Anaphylaxis Voltaren [Diclofenac Sodium] High 11/09/2015    Other (comments) Mild throat closing and severe nausea Cortisone Medium 11/09/2015    Rash Can take depro medrol if preservative free only Mobic [Meloxicam] Medium 11/09/2015    Other (comments) Throat closing, severe nausea, abd pain and facial swelling Vibramycin [Doxycycline Calcium] Medium 11/09/2015    Rash Facial rash Afrin [Pseudoephedrine Sulfate]  11/09/2015    Other (comments) Facial numbness that lasted 45 minutes during testing Antihistamine-1  03/01/2011    Anaphylaxis Azithromycin  07/24/2012    Other (comments) \"neck stiffness & face numbness & swelling\" Bactrim [Sulfamethoprim Ds]  11/09/2015    Rash Facial rash and swelling Biaxin [Clarithromycin]  11/09/2015    Rash Severe facial rash and swelling Ciprofloxacin  07/24/2012    Other (comments) \"numbness and tingling in foot & leg\" Codeine  07/24/2012    Angioedema Doxycycline  03/01/2011    Anaphylaxis Flagyl [Metronidazole]  12/21/2016    Other (comments) Reports on the 7th day of taking it \"I had SEVERE Cranial pain that was not a headache\" Pcn [Penicillins]  03/01/2011    Anaphylaxis Restasis [Cyclosporine]  07/24/2012    Angioedema Xyzal [Levocetirizine]  03/01/2011    Anaphylaxis Current Immunizations  Reviewed on 11/4/2016 Name Date Influenza High Dose Vaccine PF 9/25/2016 Influenza Vaccine 10/10/2017 Pneumococcal Conjugate (PCV-13) 1/6/2017 Pneumococcal Polysaccharide (PPSV-23) 1/1/2010 Not reviewed this visit You Were Diagnosed With   
  
 Codes Comments Chest pain, unspecified type    -  Primary ICD-10-CM: R07.9 ICD-9-CM: 786.50 Thoracic outlet syndrome     ICD-10-CM: G54.0 ICD-9-CM: 353.0 Gastroesophageal reflux disease without esophagitis     ICD-10-CM: K21.9 ICD-9-CM: 530.81 RBBB     ICD-10-CM: I45.10 ICD-9-CM: 426.4 Vitals BP Pulse Resp Height(growth percentile) Weight(growth percentile) SpO2  
 100/60 (BP 1 Location: Left arm, BP Patient Position: Sitting) 66 16 5' 2\" (1.575 m) 134 lb 12.8 oz (61.1 kg) 98% BMI OB Status Smoking Status 24.66 kg/m2 Postmenopausal Never Smoker Vitals History BMI and BSA Data Body Mass Index Body Surface Area  
 24.66 kg/m 2 1.63 m 2 Preferred Pharmacy Pharmacy Name Phone [id number] [phone] Your Updated Medication List  
  
   
 This list is accurate as of 9/7/18  2:44 PM.  Always use your most recent med list.  
  
  
  
  
 CENTRUM SILVER WOMEN PO Take 1 Tab by mouth daily. cholecalciferol (vitamin D3) 2,000 unit Tab Take 1 Tab by mouth daily. CITRACAL PO Take 1 Tab by mouth two (2) times a day. cyanocobalamin 500 mcg tablet Commonly known as:  VITAMIN B12 Take 500 mcg by mouth daily. Dexlansoprazole 60 mg Cpdb Commonly known as:  DEXILANT Take 1 Cap by mouth daily. docusate sodium 100 mg capsule Commonly known as:  [place] El Take 100 mg by mouth two (2) times daily as needed. loteprednol etabonate 0.5 % ophthalmic suspension Commonly known as:  Carmenza Foster Administer 1 Drop to both eyes two (2) times a day. MIRALAX 17 gram packet Generic drug:  polyethylene glycol Take 17 g by mouth daily as needed. traMADol 50 mg tablet Commonly known as:  ULTRAM  
Take 50 mg by mouth as needed. Follow-up Instructions Return in about 6 months (around 3/7/2019). Patient Instructions Follow up with Gloria Adams in 6 months or sooner Introducing [facility] & HEALTH SERVICES! Jovita Cruz introduces [email or web address] patient portal. Now you can access parts of your medical record, email your doctor's office, and request medication refills online. 1. In your internet browser, go to [email or web address] 2. Click on the First Time User? Click Here link in the Sign In box. You will see the New Member Sign Up page. 3. Enter your [email or web address] Access Code exactly as it appears below. You will not need to use this code after youve completed the sign-up process. If you do not sign up before the expiration date, you must request a new code. · [email or web address] Access Code: [email or web address] Expires: 9/10/2018  3:27 PM 
 
4. Enter the last four digits of your Social Security Number (xxxx) and Date of Birth (mm/dd/yyyy) as indicated and click Submit.  You will be taken to the next sign-up page. 5. Create a Xanitos ID. This will be your Xanitos login ID and cannot be changed, so think of one that is secure and easy to remember. 6. Create a Xanitos password. You can change your password at any time. 7. Enter your Password Reset Question and Answer. This can be used at a later time if you forget your password. 8. Enter your e-mail address. You will receive e-mail notification when new information is available in [address] 9. Click Sign Up. You can now view and download portions of your medical record. 10. Click the Download Summary menu link to download a portable copy of your medical information. If you have questions, please visit the Frequently Asked Questions section of the Xanitos website. Remember, Xanitos is NOT to be used for urgent needs. For medical emergencies, dial 911. Now available from your iPhone and Android! Please provide this summary of care documentation to your next provider. Your primary care clinician is listed as Peter. If you have any questions after today's visit, please call [phone].

## 2018-09-07 NOTE — PROGRESS NOTES
HISTORY OF PRESENT ILLNESS  Alcides Almaraz is a 66 y.o. female. Patient with h/o GERD, respiratory insufficiency  In 2009-10 due to exposure to a chemical with reduction in lung function at least temporary   Seen here for cardiac evaluation of cp and sob after being seen at [facility] for cp on 3/16  Also h/o history of thoracic outlet syndrome followed by neurology  Echo on 5/13 :Systolic function was normal by visual assessment. Ejection fraction was estimated to be 60 %. There were no regional wall  motion abnormalities. Tricuspid valve: There was mild regurgitation. Pulmonary artery systolic  pressure: 20 mmHg. Nuclear stress test on 3/16 : no ischemia or mi and EF 82%  Echo on 9/18:Left ventricle: Systolic function was normal. Ejection fraction was  estimated in the range of 60 % to 65 %. No obvious wall motion  abnormalities identified in the views obtained. Comparisons: There has been no interval change since the previous exam  dated 2/8/2018. Nuclear stress test on 9/18:Myocardial perfusion imaging is normal. Anterior and apical wall abnormality likely attenuation artifact.  No gross ischemia or mi noted  Abnormal ecg findings possibly a false positive  Overall left ventricular systolic function was normal.EF 79%  Past Medical History:   Diagnosis Date    Diverticulitis     Dystrophy, cornea     Environmental allergies     Family history of skin cancer     GERD (gastroesophageal reflux disease)     Hypotension     Ill-defined condition     Hyponatremia    Multiple drug allergies     Osteoporosis     Sun-damaged skin     Vertebral compression fracture ([facility] [address] 01/05/12    Vocal cord anomaly     vocal cord dysfunction per speech therapist     Past Surgical History:   Procedure Laterality Date    COLONOSCOPY N/A 7/7/2017    COL[facility] performed [facility] Martha Bo MD at [facility] ENDOSCOPY    HC [address] HX CATARACT REMOVAL  07/2012    HX GYN      D&C    HX MOHS PROCEDURES 04/11/2017    BCC left cheek by Dr. Brant Han History   Substance Use Topics    Smoking status: Never Smoker    Smokeless tobacco: Never Used    Alcohol use No       HPI  Ome sob after stress test but now resolved  No recurrent chest and arm pain  Review of Systems   Respiratory: Positive for shortness of breath.         Physical Exam  Visit Vitals    /60 (BP 1 Location: Left arm, BP Patient Position: Sitting)    Pulse 66    Resp 16    Ht 5' 2\" (1.575 m)    Wt 61.1 kg (134 lb 12.8 oz)    SpO2 98%    BMI 24.66 kg/m2       ASSESSMENT and PLAN  CP: not recurrent Normal nuclear stress test and echo with normal EF  Discussed at length   Potential for false negative stress tests  She will resume all of her activities and will let me know if any recurrent symptoms  Her ECG is unchanged with NSR and RBBB    BP: low , encouraged fluids intake    Lipids: followed by her PCP    Memory loss: proceed with neurology evaluation, in the meanwhile start asa 81 mg m/w/f    See her back in 6 months or sooner if any recurrent issues

## 2019-04-26 PROBLEM — E87.1 ACUTE HYPONATREMIA: Status: RESOLVED | Noted: 2017-05-16 | Resolved: 2019-04-26

## 2019-05-09 ENCOUNTER — APPOINTMENT (OUTPATIENT)
Dept: CT IMAGING | Age: 79
End: 2019-05-09
Attending: EMERGENCY MEDICINE
Payer: MEDICARE

## 2019-05-09 ENCOUNTER — HOSPITAL ENCOUNTER (EMERGENCY)
Age: 79
Discharge: HOME | End: 2019-05-09
Attending: EMERGENCY MEDICINE
Payer: MEDICARE

## 2019-05-09 VITALS
BODY MASS INDEX: 24.29 KG/M2 | WEIGHT: 132 LBS | DIASTOLIC BLOOD PRESSURE: 80 MMHG | SYSTOLIC BLOOD PRESSURE: 136 MMHG | OXYGEN SATURATION: 98 % | HEIGHT: 62 IN | RESPIRATION RATE: 18 BRPM | TEMPERATURE: 98.5 F | HEART RATE: 72 BPM

## 2019-05-09 DIAGNOSIS — R10.31 ABDOMINAL PAIN, RIGHT LOWER QUADRANT: Primary | ICD-10-CM

## 2019-05-09 LAB
ALBUMIN SERPL-MCNC: 4 G/DL (ref 3.5–5)
ALBUMIN/GLOB SERPL: 1 {RATIO} (ref 1.1–2.2)
ALP SERPL-CCNC: 69 U/L (ref 45–117)
ALT SERPL-CCNC: 20 U/L (ref 12–78)
ANION GAP SERPL CALC-SCNC: 6 MMOL/L (ref 5–15)
APPEARANCE UR: CLEAR
AST SERPL-CCNC: 17 U/L (ref 15–37)
BASOPHILS # BLD: 0.1 K/UL (ref 0–0.1)
BASOPHILS NFR BLD: 1 % (ref 0–1)
BILIRUB SERPL-MCNC: 0.4 MG/DL (ref 0.2–1)
BILIRUB UR QL: NEGATIVE
BUN SERPL-MCNC: 7 MG/DL (ref 6–20)
BUN/CREAT SERPL: 11 (ref 12–20)
CALCIUM SERPL-MCNC: 9.3 MG/DL (ref 8.5–10.1)
CHLORIDE SERPL-SCNC: 101 MMOL/L (ref 97–108)
CO2 SERPL-SCNC: 28 MMOL/L (ref 21–32)
COLOR UR: NORMAL
COMMENT, HOLDF: NORMAL
CREAT SERPL-MCNC: 0.66 MG/DL (ref 0.55–1.02)
DIFFERENTIAL METHOD BLD: ABNORMAL
EOSINOPHIL # BLD: 0.5 K/UL (ref 0–0.4)
EOSINOPHIL NFR BLD: 11 % (ref 0–7)
ERYTHROCYTE [DISTWIDTH] IN BLOOD BY AUTOMATED COUNT: 13.2 % (ref 11.5–14.5)
GLOBULIN SER CALC-MCNC: 4.1 G/DL (ref 2–4)
GLUCOSE SERPL-MCNC: 95 MG/DL (ref 65–100)
GLUCOSE UR STRIP.AUTO-MCNC: NEGATIVE MG/DL
HCT VFR BLD AUTO: 40 % (ref 35–47)
HGB BLD-MCNC: 12.7 G/DL (ref 11.5–16)
HGB UR QL STRIP: NEGATIVE
IMM GRANULOCYTES # BLD AUTO: 0 K/UL (ref 0–0.04)
IMM GRANULOCYTES NFR BLD AUTO: 0 % (ref 0–0.5)
KETONES UR QL STRIP.AUTO: NEGATIVE MG/DL
LEUKOCYTE ESTERASE UR QL STRIP.AUTO: NEGATIVE
LIPASE SERPL-CCNC: 146 U/L (ref 73–393)
LYMPHOCYTES # BLD: 0.9 K/UL (ref 0.8–3.5)
LYMPHOCYTES NFR BLD: 20 % (ref 12–49)
MCH RBC QN AUTO: 28.7 PG (ref 26–34)
MCHC RBC AUTO-ENTMCNC: 31.8 G/DL (ref 30–36.5)
MCV RBC AUTO: 90.3 FL (ref 80–99)
MONOCYTES # BLD: 0.4 K/UL (ref 0–1)
MONOCYTES NFR BLD: 10 % (ref 5–13)
NEUTS SEG # BLD: 2.6 K/UL (ref 1.8–8)
NEUTS SEG NFR BLD: 58 % (ref 32–75)
NITRITE UR QL STRIP.AUTO: NEGATIVE
NRBC # BLD: 0 K/UL (ref 0–0.01)
NRBC BLD-RTO: 0 PER 100 WBC
PH UR STRIP: 6.5 [PH] (ref 5–8)
PLATELET # BLD AUTO: 219 K/UL (ref 150–400)
PMV BLD AUTO: 9.6 FL (ref 8.9–12.9)
POTASSIUM SERPL-SCNC: 3.6 MMOL/L (ref 3.5–5.1)
PROT SERPL-MCNC: 8.1 G/DL (ref 6.4–8.2)
PROT UR STRIP-MCNC: NEGATIVE MG/DL
RBC # BLD AUTO: 4.43 M/UL (ref 3.8–5.2)
SAMPLES BEING HELD,HOLD: NORMAL
SODIUM SERPL-SCNC: 135 MMOL/L (ref 136–145)
SP GR UR REFRACTOMETRY: 1.01 (ref 1–1.03)
UR CULT HOLD, URHOLD: NORMAL
UROBILINOGEN UR QL STRIP.AUTO: 0.2 EU/DL (ref 0.2–1)
WBC # BLD AUTO: 4.5 K/UL (ref 3.6–11)

## 2019-05-09 PROCEDURE — 74176 CT ABD & PELVIS W/O CONTRAST: CPT

## 2019-05-09 PROCEDURE — 36415 COLL VENOUS BLD VENIPUNCTURE: CPT

## 2019-05-09 PROCEDURE — 83690 ASSAY OF LIPASE: CPT

## 2019-05-09 PROCEDURE — 74011250637 HC RX REV CODE- 250/637: Performed by: EMERGENCY MEDICINE

## 2019-05-09 PROCEDURE — 85025 COMPLETE CBC W/AUTO DIFF WBC: CPT

## 2019-05-09 PROCEDURE — 81003 URINALYSIS AUTO W/O SCOPE: CPT

## 2019-05-09 PROCEDURE — 99284 EMERGENCY DEPT VISIT MOD MDM: CPT

## 2019-05-09 PROCEDURE — 80053 COMPREHEN METABOLIC PANEL: CPT

## 2019-05-09 RX ORDER — TRAMADOL HYDROCHLORIDE 50 MG/1
50 TABLET ORAL
Status: DISCONTINUED | OUTPATIENT
Start: 2019-05-09 | End: 2019-05-09

## 2019-05-09 RX ORDER — HYDROCODONE BITARTRATE AND ACETAMINOPHEN 7.5; 325 MG/1; MG/1
1 TABLET ORAL
Qty: 8 TAB | Refills: 0 | Status: SHIPPED | OUTPATIENT
Start: 2019-05-09 | End: 2019-05-15

## 2019-05-09 RX ORDER — TRAMADOL HYDROCHLORIDE 50 MG/1
100 TABLET ORAL
Status: COMPLETED | OUTPATIENT
Start: 2019-05-09 | End: 2019-05-09

## 2019-05-09 RX ADMIN — TRAMADOL HYDROCHLORIDE 100 MG: 50 TABLET, FILM COATED ORAL at 16:26

## 2019-05-09 NOTE — DISCHARGE INSTRUCTIONS

## 2019-05-09 NOTE — PROGRESS NOTES
Spiritual Care Assessment/Progress Note [address] 
 
 
NAME: Jennifer Mayo      MRN: [id number] AGE: 78 y.o. SEX: female Holiness Affiliation: Hindu  
Language: [place] 5/9/2019     Total Time (in minutes): 20 Spiritual Assessment begun in [address] [facility] [address] DEP through conversation with: 
  
    [x]Patient        [x] Family    [] Friend(s) Reason for Consult: Emergency Department visit Spiritual beliefs: (Please include comment if needed) [x] Identifies with a melanie tradition:     
   [] Supported by a melanie community:        
   [] Claims no spiritual orientation:       
   [] Seeking spiritual identity:            
   [] Adheres to an individual form of spirituality:       
   [] Not able to assess:                   
 
    
Identified resources for coping:  
   [x] Prayer                           
   [] Music                  [] Guided Imagery [x] Family/friends                 [] Pet visits [] Devotional reading                         [] Unknown 
   [] Other:                                          
 
 
Interventions offered during this visit: (See comments for more details) Patient Interventions: Affirmation of emotions/emotional suffering, Affirmation of melanie, Anointing of the sick (other than Sikhism), Normalization of emotional/spiritual concerns, Prayer (assurance of) Family/Friend(s): Affirmation of emotions/emotional suffering, Affirmation of melanie, Normalization of emotional/spiritual concerns, Prayer (assurance of) Plan of Care: 
 
 [] Support spiritual and/or cultural needs  
 [] Support AMD and/or advance care planning process    
 [] Support grieving process 
 [] Coordinate Rites and/or Rituals  
 [] Coordination with community clergy [] No spiritual needs identified at this time 
 [] Detailed Plan of Care below (See Comments)  [] Make referral to Music Therapy 
[] Make referral to Pet Therapy    
[] Make referral to Addiction services [] Make referral to [facility] 
[] Make referral to Spiritual Care Partner 
[] No future visits requested       
[x] Follow up visits as needed Comments:  visit in ER. Pt appreciative and welcoming of visit.  provided pastoral listening, support and assurance of prayer. Let pt and family know of  availability. Please contact [phone] [address] for further support.  follow up as needed. Garrison Mojica, MACE 
 [phone])

## 2019-05-09 NOTE — ED PROVIDER NOTES
HPI Pt reports abrupt onset of right lower quadrant pain yesterday; pain continued through the night. The pain worsens with laying flat but does not change with walking or coughing. Denies fever, cough, cold symptoms, headache, neck pain, visual changes or focal weakness. She has a few tiny red papules on the right flank area; ? Early shingles rash. Denies any  difficulty breathing, difficulty swallowing, SOB or chest pain. Denies any nausea, vomiting, urinary symptoms or diarrhea. Pt. Reports that she has not had any pain medications or food prior to arrival.  
Old charts reviewed Past Medical History:  
Diagnosis Date  Diverticulitis  Dystrophy, cornea  Environmental allergies  Family history of skin cancer  GERD (gastroesophageal reflux disease)  Hypotension  Ill-defined condition Hyponatremia  Multiple drug allergies  Osteoporosis  Sun-damaged skin  Vertebral compression fracture ([facility] [address] 01/05/12  Vocal cord anomaly   
 vocal cord dysfunction per speech therapist  
 
 
Past Surgical History:  
Procedure Laterality Date  COLONOSCOPY N/A 7/7/2017 COL[facility]COPY performed by [facility] Kat [facility] at [facility] ENDOSCOPY  
 HC  [facility]  HX CATARACT REMOVAL  07/2012  HX GYN    
 D&C  
 HX MOHS PROCEDURES  04/11/2017 BCC left cheek by Dr. Carrol Alcaraz Family History:  
Problem Relation Age of Onset  Heart Disease Mother  Cancer Father 61  
     lung  Cataract Daughter   
     congenital cataracts  Heart Disease Daughter   
     fast heart rythmn Social History Socioeconomic History  Marital status:  Spouse name: Not on file  Number of children: Not on file  Years of education: Not on file  Highest education level: Not on file Occupational History  Not on file Social Needs  Financial resource strain: Not on file  Food insecurity:  
  Worry: Not on file Inability: Not on file  Transportation needs:  
  Medical: Not on file Non-medical: Not on file Tobacco Use  Smoking status: Never Smoker  Smokeless tobacco: Never Used Substance and Sexual Activity  Alcohol use: No  
 Drug use: No  
 Sexual activity: Not on file Lifestyle  Physical activity:  
  Days per week: Not on file Minutes per session: Not on file  Stress: Not on file Relationships  Social connections:  
  Talks on phone: Not on file Gets together: Not on file Attends Hindu service: Not on file Active member of club or organization: Not on file Attends meetings of clubs or organizations: Not on file Relationship status: Not on file  Intimate partner violence:  
  Fear of current or ex partner: Not on file Emotionally abused: Not on file Physically abused: Not on file Forced sexual activity: Not on file Other Topics Concern  Not on file Social History Narrative  Not on file ALLERGIES: Ceftin [cefuroxime axetil]; Clindamycin; Ketek [telithromycin]; Levaquin [levofloxacin]; Methylprednisolone; Nexium [esomeprazole magnesium]; Other medication; Sudafed [pseudoephedrine hcl]; Voltaren [diclofenac sodium]; Cortisone; Mobic [meloxicam]; Vibramycin [doxycycline calcium]; Afrin [pseudoephedrine sulfate]; Antihistamine-1; Azithromycin; Bactrim [sulfamethoprim ds]; Biaxin [clarithromycin]; Ciprofloxacin; Codeine; Doxycycline; Flagyl [metronidazole]; Gabapentin; Pcn [penicillins]; Restasis [cyclosporine]; and Xyzal [levocetirizine] Review of Systems Constitutional: Negative for activity change, appetite change, fever and unexpected weight change. HENT: Negative for congestion, dental problem, sore throat and trouble swallowing. Respiratory: Negative for cough, chest tightness and shortness of breath. Cardiovascular: Negative for chest pain, palpitations and leg swelling. Gastrointestinal: Positive for abdominal pain. Negative for diarrhea, nausea and vomiting. Genitourinary: Negative for difficulty urinating, dysuria and flank pain. Musculoskeletal: Negative for arthralgias, myalgias and neck pain. Neurological: Negative for dizziness, weakness and headaches. All other systems reviewed and are negative. Vitals:  
 05/09/19 1341 05/09/19 1411 BP:  133/87 Pulse: 95 79 Resp:  16 Temp:  98.8 °F (37.1 °C) SpO2: 98% 98% Weight:  59.9 kg (132 lb) Height:  5' 2\" (1.575 m) Physical Exam  
Constitutional: She is oriented to person, place, and time. Elderly white female; non smoker; lives alone HENT:  
Head: Normocephalic. Cardiovascular: Normal rate and regular rhythm. Pulmonary/Chest: Effort normal and breath sounds normal.  
Abdominal: Normal appearance and bowel sounds are normal. There is tenderness in the right lower quadrant. Neurological: She is alert and oriented to person, place, and time. Skin: Skin is warm and dry. Few tiny clustered papules on the right flank (?early shingles) Psychiatric: She has a normal mood and affect. Nursing note and vitals reviewed. MDM Procedures Dr. Marya Estrada examined the pt and discussed the plan of care. Pt reports some relief from tramadol; plan to give a few pain pills if needed for pain control to sleep. Pt's daughter will be staying with her mom. Encouraged close follow up with her PCP tomorrow. Patient's results and plan of care have been reviewed with her. Patient and/or family have verbally conveyed their understanding and agreement of the patient's signs, symptoms, diagnosis, treatment and prognosis and additionally agree to follow up as recommended or return to the Emergency Room should her condition change prior to follow-up.   Discharge instructions have also been provided to the patient with some educational information regarding her diagnosis as well a list of reasons why she would want to return to the ER prior to her follow-up appointment should her condition change. Corie , NP

## 2019-05-15 ENCOUNTER — HOSPITAL ENCOUNTER (INPATIENT)
Age: 79
LOS: 2 days | Discharge: HOME | DRG: 644 | End: 2019-05-17
Attending: EMERGENCY MEDICINE | Admitting: INTERNAL MEDICINE
Payer: MEDICARE

## 2019-05-15 DIAGNOSIS — B02.8 HERPES ZOSTER WITH OTHER COMPLICATION: ICD-10-CM

## 2019-05-15 DIAGNOSIS — E87.1 HYPONATREMIA: Primary | ICD-10-CM

## 2019-05-15 DIAGNOSIS — R52 INTRACTABLE PAIN: ICD-10-CM

## 2019-05-15 PROBLEM — B02.9 HERPES ZOSTER WITHOUT COMPLICATION: Status: ACTIVE | Noted: 2019-05-15

## 2019-05-15 LAB
ALBUMIN SERPL-MCNC: 4 G/DL (ref 3.5–5)
ALBUMIN/GLOB SERPL: 1.1 {RATIO} (ref 1.1–2.2)
ALP SERPL-CCNC: 62 U/L (ref 45–117)
ALT SERPL-CCNC: 20 U/L (ref 12–78)
ANION GAP SERPL CALC-SCNC: 10 MMOL/L (ref 5–15)
APPEARANCE UR: ABNORMAL
AST SERPL-CCNC: 28 U/L (ref 15–37)
BACTERIA URNS QL MICRO: NEGATIVE /HPF
BASOPHILS # BLD: 0 K/UL (ref 0–0.1)
BASOPHILS NFR BLD: 0 % (ref 0–1)
BILIRUB SERPL-MCNC: 0.6 MG/DL (ref 0.2–1)
BILIRUB UR QL: NEGATIVE
BUN SERPL-MCNC: 6 MG/DL (ref 6–20)
BUN/CREAT SERPL: 9 (ref 12–20)
CALCIUM SERPL-MCNC: 9.4 MG/DL (ref 8.5–10.1)
CHLORIDE SERPL-SCNC: 95 MMOL/L (ref 97–108)
CO2 SERPL-SCNC: 22 MMOL/L (ref 21–32)
COLOR UR: ABNORMAL
COMMENT, HOLDF: NORMAL
CREAT SERPL-MCNC: 0.64 MG/DL (ref 0.55–1.02)
DIFFERENTIAL METHOD BLD: ABNORMAL
EOSINOPHIL # BLD: 0.2 K/UL (ref 0–0.4)
EOSINOPHIL NFR BLD: 5 % (ref 0–7)
EPITH CASTS URNS QL MICRO: ABNORMAL /LPF
ERYTHROCYTE [DISTWIDTH] IN BLOOD BY AUTOMATED COUNT: 12.9 % (ref 11.5–14.5)
GLOBULIN SER CALC-MCNC: 3.8 G/DL (ref 2–4)
GLUCOSE SERPL-MCNC: 101 MG/DL (ref 65–100)
GLUCOSE UR STRIP.AUTO-MCNC: NEGATIVE MG/DL
HCT VFR BLD AUTO: 41.4 % (ref 35–47)
HGB BLD-MCNC: 13.9 G/DL (ref 11.5–16)
HGB UR QL STRIP: NEGATIVE
HYALINE CASTS URNS QL MICRO: ABNORMAL /LPF (ref 0–5)
IMM GRANULOCYTES # BLD AUTO: 0 K/UL
IMM GRANULOCYTES NFR BLD AUTO: 0 %
KETONES UR QL STRIP.AUTO: 15 MG/DL
LEUKOCYTE ESTERASE UR QL STRIP.AUTO: NEGATIVE
LIPASE SERPL-CCNC: 106 U/L (ref 73–393)
LYMPHOCYTES # BLD: 0.5 K/UL (ref 0.8–3.5)
LYMPHOCYTES NFR BLD: 13 % (ref 12–49)
MCH RBC QN AUTO: 29 PG (ref 26–34)
MCHC RBC AUTO-ENTMCNC: 33.6 G/DL (ref 30–36.5)
MCV RBC AUTO: 86.4 FL (ref 80–99)
MONOCYTES # BLD: 0.5 K/UL (ref 0–1)
MONOCYTES NFR BLD: 13 % (ref 5–13)
NEUTS BAND NFR BLD MANUAL: 1 % (ref 0–6)
NEUTS SEG # BLD: 2.5 K/UL (ref 1.8–8)
NEUTS SEG NFR BLD: 68 % (ref 32–75)
NITRITE UR QL STRIP.AUTO: NEGATIVE
NRBC # BLD: 0 K/UL (ref 0–0.01)
NRBC BLD-RTO: 0 PER 100 WBC
OSMOLALITY SERPL: 268 MOSM/KG H2O
OSMOLALITY UR: 468 MOSM/KG H2O
PH UR STRIP: 8.5 [PH] (ref 5–8)
PLATELET # BLD AUTO: 177 K/UL (ref 150–400)
PMV BLD AUTO: 11 FL (ref 8.9–12.9)
POTASSIUM SERPL-SCNC: 4.2 MMOL/L (ref 3.5–5.1)
PROT SERPL-MCNC: 7.8 G/DL (ref 6.4–8.2)
PROT UR STRIP-MCNC: NEGATIVE MG/DL
RBC # BLD AUTO: 4.79 M/UL (ref 3.8–5.2)
RBC #/AREA URNS HPF: ABNORMAL /HPF (ref 0–5)
RBC MORPH BLD: ABNORMAL
SAMPLES BEING HELD,HOLD: NORMAL
SODIUM SERPL-SCNC: 127 MMOL/L (ref 136–145)
SP GR UR REFRACTOMETRY: 1.02 (ref 1–1.03)
UR CULT HOLD, URHOLD: NORMAL
UROBILINOGEN UR QL STRIP.AUTO: 0.2 EU/DL (ref 0.2–1)
WBC # BLD AUTO: 3.7 K/UL (ref 3.6–11)
WBC URNS QL MICRO: ABNORMAL /HPF (ref 0–4)

## 2019-05-15 PROCEDURE — 74011250637 HC RX REV CODE- 250/637: Performed by: INTERNAL MEDICINE

## 2019-05-15 PROCEDURE — 74011250636 HC RX REV CODE- 250/636: Performed by: INTERNAL MEDICINE

## 2019-05-15 PROCEDURE — 81001 URINALYSIS AUTO W/SCOPE: CPT

## 2019-05-15 PROCEDURE — 74011250636 HC RX REV CODE- 250/636: Performed by: EMERGENCY MEDICINE

## 2019-05-15 PROCEDURE — 80053 COMPREHEN METABOLIC PANEL: CPT

## 2019-05-15 PROCEDURE — 83935 ASSAY OF URINE OSMOLALITY: CPT

## 2019-05-15 PROCEDURE — 83690 ASSAY OF LIPASE: CPT

## 2019-05-15 PROCEDURE — 96375 TX/PRO/DX INJ NEW DRUG ADDON: CPT

## 2019-05-15 PROCEDURE — 96376 TX/PRO/DX INJ SAME DRUG ADON: CPT

## 2019-05-15 PROCEDURE — 83930 ASSAY OF BLOOD OSMOLALITY: CPT

## 2019-05-15 PROCEDURE — 65270000029 HC RM PRIVATE

## 2019-05-15 PROCEDURE — 99285 EMERGENCY DEPT VISIT HI MDM: CPT

## 2019-05-15 PROCEDURE — 85025 COMPLETE CBC W/AUTO DIFF WBC: CPT

## 2019-05-15 PROCEDURE — 96361 HYDRATE IV INFUSION ADD-ON: CPT

## 2019-05-15 PROCEDURE — 36415 COLL VENOUS BLD VENIPUNCTURE: CPT

## 2019-05-15 PROCEDURE — 96374 THER/PROPH/DIAG INJ IV PUSH: CPT

## 2019-05-15 RX ORDER — FENTANYL CITRATE 50 UG/ML
25 INJECTION, SOLUTION INTRAMUSCULAR; INTRAVENOUS
Status: COMPLETED | OUTPATIENT
Start: 2019-05-15 | End: 2019-05-15

## 2019-05-15 RX ORDER — POLYETHYLENE GLYCOL 3350 17 G/17G
17 POWDER, FOR SOLUTION ORAL
Status: DISCONTINUED | OUTPATIENT
Start: 2019-05-15 | End: 2019-05-17 | Stop reason: HOSPADM

## 2019-05-15 RX ORDER — OXYCODONE AND ACETAMINOPHEN 5; 325 MG/1; MG/1
1-2 TABLET ORAL
Status: DISCONTINUED | OUTPATIENT
Start: 2019-05-15 | End: 2019-05-16

## 2019-05-15 RX ORDER — SODIUM CHLORIDE 9 MG/ML
75 INJECTION, SOLUTION INTRAVENOUS CONTINUOUS
Status: DISCONTINUED | OUTPATIENT
Start: 2019-05-15 | End: 2019-05-16

## 2019-05-15 RX ORDER — PANTOPRAZOLE SODIUM 40 MG/1
40 TABLET, DELAYED RELEASE ORAL
Status: DISCONTINUED | OUTPATIENT
Start: 2019-05-16 | End: 2019-05-17 | Stop reason: HOSPADM

## 2019-05-15 RX ORDER — FENTANYL CITRATE 50 UG/ML
50 INJECTION, SOLUTION INTRAMUSCULAR; INTRAVENOUS
Status: COMPLETED | OUTPATIENT
Start: 2019-05-15 | End: 2019-05-15

## 2019-05-15 RX ORDER — MELATONIN
2000 DAILY
Status: DISCONTINUED | OUTPATIENT
Start: 2019-05-16 | End: 2019-05-17 | Stop reason: HOSPADM

## 2019-05-15 RX ORDER — DOCUSATE SODIUM 100 MG/1
100 CAPSULE, LIQUID FILLED ORAL 2 TIMES DAILY
Status: DISCONTINUED | OUTPATIENT
Start: 2019-05-15 | End: 2019-05-17 | Stop reason: HOSPADM

## 2019-05-15 RX ORDER — ENOXAPARIN SODIUM 100 MG/ML
40 INJECTION SUBCUTANEOUS EVERY 24 HOURS
Status: DISCONTINUED | OUTPATIENT
Start: 2019-05-15 | End: 2019-05-17 | Stop reason: HOSPADM

## 2019-05-15 RX ORDER — ACETAMINOPHEN 325 MG/1
650 TABLET ORAL
Status: DISCONTINUED | OUTPATIENT
Start: 2019-05-15 | End: 2019-05-17 | Stop reason: HOSPADM

## 2019-05-15 RX ORDER — THERA TABS 400 MCG
1 TAB ORAL DAILY
Status: DISCONTINUED | OUTPATIENT
Start: 2019-05-16 | End: 2019-05-17 | Stop reason: HOSPADM

## 2019-05-15 RX ORDER — ACETAMINOPHEN 325 MG/1
325 TABLET ORAL EVERY 8 HOURS
COMMUNITY

## 2019-05-15 RX ORDER — GABAPENTIN 300 MG/1
CAPSULE ORAL
COMMUNITY
End: 2019-05-15

## 2019-05-15 RX ORDER — ONDANSETRON 2 MG/ML
4 INJECTION INTRAMUSCULAR; INTRAVENOUS
Status: COMPLETED | OUTPATIENT
Start: 2019-05-15 | End: 2019-05-15

## 2019-05-15 RX ORDER — LOTEPREDNOL ETABONATE 5 MG/ML
1 SUSPENSION/ DROPS OPHTHALMIC DAILY
Status: DISCONTINUED | OUTPATIENT
Start: 2019-05-16 | End: 2019-05-17 | Stop reason: HOSPADM

## 2019-05-15 RX ORDER — ONDANSETRON 2 MG/ML
4 INJECTION INTRAMUSCULAR; INTRAVENOUS
Status: DISCONTINUED | OUTPATIENT
Start: 2019-05-15 | End: 2019-05-17 | Stop reason: HOSPADM

## 2019-05-15 RX ORDER — THERA TABS 400 MCG
1 TAB ORAL DAILY
COMMUNITY

## 2019-05-15 RX ORDER — DEXLANSOPRAZOLE 60 MG/1
60 CAPSULE, DELAYED RELEASE ORAL DAILY
Status: DISCONTINUED | OUTPATIENT
Start: 2019-05-16 | End: 2019-05-15 | Stop reason: CLARIF

## 2019-05-15 RX ORDER — VALACYCLOVIR HYDROCHLORIDE 1 G/1
TABLET, FILM COATED ORAL
COMMUNITY
End: 2019-05-17

## 2019-05-15 RX ORDER — VALACYCLOVIR HYDROCHLORIDE 500 MG/1
1000 TABLET, FILM COATED ORAL 3 TIMES DAILY
Status: DISCONTINUED | OUTPATIENT
Start: 2019-05-15 | End: 2019-05-17 | Stop reason: HOSPADM

## 2019-05-15 RX ORDER — FENTANYL CITRATE 50 UG/ML
75 INJECTION, SOLUTION INTRAMUSCULAR; INTRAVENOUS
Status: DISCONTINUED | OUTPATIENT
Start: 2019-05-15 | End: 2019-05-17 | Stop reason: HOSPADM

## 2019-05-15 RX ADMIN — SODIUM CHLORIDE 1000 ML: 900 INJECTION, SOLUTION INTRAVENOUS at 11:32

## 2019-05-15 RX ADMIN — VALACYCLOVIR HYDROCHLORIDE 1000 MG: 500 TABLET, FILM COATED ORAL at 14:44

## 2019-05-15 RX ADMIN — FENTANYL CITRATE 25 MCG: 50 INJECTION, SOLUTION INTRAMUSCULAR; INTRAVENOUS at 09:20

## 2019-05-15 RX ADMIN — FENTANYL CITRATE 50 MCG: 50 INJECTION, SOLUTION INTRAMUSCULAR; INTRAVENOUS at 11:32

## 2019-05-15 RX ADMIN — OXYCODONE HYDROCHLORIDE AND ACETAMINOPHEN 2 TABLET: 5; 325 TABLET ORAL at 17:54

## 2019-05-15 RX ADMIN — SODIUM CHLORIDE 1000 ML: 900 INJECTION, SOLUTION INTRAVENOUS at 09:20

## 2019-05-15 RX ADMIN — ONDANSETRON 4 MG: 2 INJECTION INTRAMUSCULAR; INTRAVENOUS at 16:54

## 2019-05-15 RX ADMIN — FENTANYL CITRATE 75 MCG: 50 INJECTION, SOLUTION INTRAMUSCULAR; INTRAVENOUS at 14:42

## 2019-05-15 RX ADMIN — ONDANSETRON 4 MG: 2 INJECTION INTRAMUSCULAR; INTRAVENOUS at 09:20

## 2019-05-15 RX ADMIN — SODIUM CHLORIDE 75 ML/HR: 900 INJECTION, SOLUTION INTRAVENOUS at 18:00

## 2019-05-15 RX ADMIN — ONDANSETRON 4 MG: 2 INJECTION INTRAMUSCULAR; INTRAVENOUS at 23:09

## 2019-05-15 NOTE — PROGRESS NOTES
TRANSFER - IN REPORT: 
 
Verbal report received from Vijay(name) on Sampson Larios  being received from ED(unit) for routine progression of care Report consisted of patients Situation, Background, Assessment and  
Recommendations(SBAR). Information from the following report(s) SBAR, Kardex, Intake/Output, MAR and Recent Results was reviewed with the receiving nurse. Opportunity for questions and clarification was provided. Assessment completed upon patients arrival to unit and care assumed.

## 2019-05-15 NOTE — ED PROVIDER NOTES
0830:  
I have evaluated the patient as the Provider in Triage. I have reviewed Her vital signs and the triage nurse assessment. I have talked with the patient and any available family and advised that I am the provider in triage and have ordered the appropriate study to initiate their work up based on the clinical presentation during my assessment. I have advised that the patient will be accommodated in the Main ED as soon as possible. I have also requested to contact the triage nurse or myself immediately if the patient experiences any changes in their condition during this brief waiting period. Geoff Patel, JERICHO Patient appears acutely ill with right flank pain, shingles outbreak, nausea and vomiting.   
 
78 y.o. female with past medical history significant for GERD and diverticulitis who presents from home via private vehicle with chief complaint of flank pain. Patient was seen here 6 days ago for RLQ abdominal pain, her CT scan was negative for acute process, and her lab work was normal. Patient was discharged on norco. Patient states since then her abdominal and now right flank pain has worsened, and she developed a rash. Patient was then dx with shingles and started on Valacyclovir 4 days ago. Since then, the patient has not been sleeping well due to pain. Early this morning, she tried taking a dose of norco, but then started at 0300 with nausea and vomiting. Patient also reports currently being on prednisone. Patient arrives today for her intolerence of PO and continued pain. Patient is on tramadol for chronic pain. There are no other acute medical concerns at this time. Social hx: Nonsmoker; No EtOH use PCP: Anu Finney MD 
 
Past Medical History: 
No date: Diverticulitis No date: Dystrophy, cornea No date: Environmental allergies No date: Family history of skin cancer No date: GERD (gastroesophageal reflux disease) No date: Hypotension No date: Ill-defined condition Comment:  Hyponatremia No date: Multiple drug allergies No date: Osteoporosis 05/2019: Shingles No date: Sun-damaged skin 01/05/12: Vertebral compression fracture ([address] No date: Vocal cord anomaly Comment:  vocal cord dysfunction per speech therapist 
Past Surgical History: 
7/7/2017: COLONOSCOPY; N/A Comment:  COLONOSCOPY performed by Gi Funez MD at [facility]  
             ENDOSCOPY No date: HC BLD CARPEL TUNNEL RELEASE 
07/2012: HX CATARACT REMOVAL No date: HX GYN Comment:  D&C 
04/11/2017: HX MOHS PROCEDURES Comment:  BCC left cheek by Dr. Cinthia Costello Note written by Shasta Quinonez, as dictated by Jacquie Munoz MD 9:05 AM 
 
The history is provided by the patient, a relative and medical records (Daughter). No  was used. Past Medical History:  
Diagnosis Date  Diverticulitis  Dystrophy, cornea  Environmental allergies  Family history of skin cancer  GERD (gastroesophageal reflux disease)  Hypotension  Ill-defined condition Hyponatremia  Multiple drug allergies  Osteoporosis  Shingles 05/2019  Sun-damaged skin  Vertebral compression fracture ([address] 01/05/12  Vocal cord anomaly   
 vocal cord dysfunction per speech therapist  
 
 
Past Surgical History:  
Procedure Laterality Date  COLONOSCOPY N/A 7/7/2017 COLONOSCOPY performed by Gi Funez MD at [facility] ENDOSCOPY  
 HC  [address]  HX CATARACT REMOVAL  07/2012  HX GYN    
 D&C  
 HX MOHS PROCEDURES  04/11/2017 BCC left cheek by Dr. Cinthia Costello Family History:  
Problem Relation Age of Onset  Heart Disease Mother  Cancer Father 61  
     lung  Cataract Daughter   
     congenital cataracts  Heart Disease Daughter   
     fast heart rythmn Social History Socioeconomic History  Marital status:  Spouse name: Not on file  Number of children: Not on file  Years of education: Not on file  Highest education level: Not on file Occupational History  Not on file Social Needs  Financial resource strain: Not on file  Food insecurity:  
  Worry: Not on file Inability: Not on file  Transportation needs:  
  Medical: Not on file Non-medical: Not on file Tobacco Use  Smoking status: Never Smoker  Smokeless tobacco: Never Used Substance and Sexual Activity  Alcohol use: No  
 Drug use: No  
 Sexual activity: Not on file Lifestyle  Physical activity:  
  Days per week: Not on file Minutes per session: Not on file  Stress: Not on file Relationships  Social connections:  
  Talks on phone: Not on file Gets together: Not on file Attends Mu-ism service: Not on file Active member of club or organization: Not on file Attends meetings of clubs or organizations: Not on file Relationship status: Not on file  Intimate partner violence:  
  Fear of current or ex partner: Not on file Emotionally abused: Not on file Physically abused: Not on file Forced sexual activity: Not on file Other Topics Concern  Not on file Social History Narrative  Not on file ALLERGIES: Ceftin [cefuroxime axetil]; Clindamycin; Ketek [telithromycin]; Levaquin [levofloxacin]; Nexium [esomeprazole magnesium]; Sudafed [pseudoephedrine hcl]; Voltaren [diclofenac sodium]; Cortisone; Methylprednisolone; Mobic [meloxicam]; Vibramycin [doxycycline calcium]; Afrin [pseudoephedrine sulfate]; Antihistamine-1; Azithromycin; Bactrim [sulfamethoprim ds]; Biaxin [clarithromycin]; Ciprofloxacin; Codeine; Doxycycline; Flagyl [metronidazole]; Gabapentin; Pcn [penicillins]; Restasis [cyclosporine]; and Xyzal [levocetirizine] Review of Systems Constitutional: Negative for activity change, chills and fever. HENT: Negative for nosebleeds, sore throat, trouble swallowing and voice change. Eyes: Negative for visual disturbance. Respiratory: Negative for shortness of breath. Cardiovascular: Negative for chest pain and palpitations. Gastrointestinal: Positive for abdominal pain, nausea and vomiting. Negative for constipation and diarrhea. Genitourinary: Positive for flank pain. Negative for difficulty urinating, dysuria, hematuria and urgency. Musculoskeletal: Negative for back pain, neck pain and neck stiffness. Skin: Negative for color change. Allergic/Immunologic: Negative for immunocompromised state. Neurological: Negative for dizziness, seizures, syncope, weakness, light-headedness, numbness and headaches. Psychiatric/Behavioral: Negative for behavioral problems, confusion, hallucinations, self-injury and suicidal ideas. All other systems reviewed and are negative. Vitals:  
 05/15/19 1642 05/15/19 4365 BP:  167/90 Pulse: 85 77 Resp:  16 Temp:  98.3 °F (36.8 °C) SpO2: 100% 100% Weight:  60.8 kg (134 lb) Height:  5' 2\" (1.575 m) Physical Exam  
Constitutional: She is oriented to person, place, and time. She appears well-developed and well-nourished. She appears ill. No distress. Ill appearing elderly female in no acute distress. HENT:  
Head: Normocephalic and atraumatic. Eyes: Pupils are equal, round, and reactive to light. Neck: Normal range of motion. Neck supple. Cardiovascular: Normal rate, regular rhythm and normal heart sounds. Exam reveals no gallop and no friction rub. No murmur heard. Pulmonary/Chest: Effort normal and breath sounds normal. No respiratory distress. She has no wheezes. Abdominal: Soft. Bowel sounds are normal. She exhibits no distension. There is no tenderness. There is no rebound and no guarding. Musculoskeletal: Normal range of motion. Neurological: She is alert and oriented to person, place, and time. Skin: Skin is warm. Rash noted. She is not diaphoretic. Profound dermatomal distribution zoster rash approximately at the level of T10. Psychiatric: She has a normal mood and affect. Her behavior is normal. Judgment and thought content normal.  
Nursing note and vitals reviewed. Note written by Shasta Matthews, as dictated by Fide Castro MD 9:05 AM 
 
MDM Number of Diagnoses or Management Options Herpes zoster with other complication: established and worsening Hyponatremia: new and requires workup Diagnosis management comments: Plan: To core bed. Admit to the hospitalist service. Amount and/or Complexity of Data Reviewed Clinical lab tests: ordered and reviewed This is a 71-year-old female with past medical history, review of systems, physical exam is above presenting with complaints of right-sided abdominal, flank, back pain, nausea and vomiting, secondary to ongoing herpes zoster flare. Per report, patient with abdominal pain last week, 2 lesions at that time, primary care called in antivirals, pain control on Saturday, patient's taking narcotic pain medication overnight, for refractory pain, with development of 2 episodes of emesis. She endorses previously tolerating narcotic pain medication, denying other complaints at this time. Physical exam is remarkable for ill-appearing elderly female, in no acute distress, with profound dermatomal distribution all zoster approximately level of T10, noted to have otherwise unremarkable exam.  Suspect nausea and vomiting, secondary to narcotic pain medication, dehydration. Plan to provide antiemetics, IV medications, fluid bolus, obtained CMP, CBC, UA. We will reevaluate, p.o. challenge, and make a disposition. Procedures Hospitalist Rudolph for Admission 11:02 AM 
 
ED Room Number: YM83/31 Patient Name and age: Adrian Cuellar 78 y.o.  female Working Diagnosis: 1. Hyponatremia 2. Herpes zoster with other complication Readmission: no 
Isolation Requirements:  yes Recommended Level of Care:  med/surg Code Status:  Full Code Other:  
 
CONSULT NOTE: 
11:07 AM Radha Montero MD spoke with Dr. Jaycee Gupta, Consult for PCP. Discussed available diagnostic tests and clinical findings. Dr. Jaycee Gupta will see and admit the patient.

## 2019-05-15 NOTE — ED TRIAGE NOTES
Pt states that she has had severe right flank pain RLQ pain for the past week with N/V. Pt was recently diagnosed with shingles on Saturday and has been taking her valtrex.

## 2019-05-15 NOTE — ROUTINE PROCESS
TRANSFER - OUT REPORT: 
 
Verbal report given to [facility] RN(name) on [place]  being transferred to (unit) for routine progression of care Report consisted of patients Situation, Background, Assessment and  
Recommendations(SBAR). Information from the following report(s) SBAR, ED Summary, Intake/Output, MAR and Recent Results was reviewed with the receiving nurse. Lines:  
Peripheral IV 05/15/19 Right Antecubital (Active) Site Assessment Clean, dry, & intact 5/15/2019  9:19 AM  
Phlebitis Assessment 0 5/15/2019  9:19 AM  
Infiltration Assessment 0 5/15/2019  9:19 AM  
Dressing Status Clean, dry, & intact 5/15/2019  9:19 AM  
Dressing Type Tape 5/15/2019  9:19 AM  
Hub Color/Line Status Pink;Flushed;Patent 5/15/2019  9:19 AM  
Action Taken Blood drawn 5/15/2019  9:19 AM  
  
 
Opportunity for questions and clarification was provided. Patient transported with: 
 [employer]

## 2019-05-15 NOTE — ED TRIAGE NOTES
Charlotte Kovacs: pt arrives with family for c/o Right flank pain and RLQ pain x days. +N/V. Recently dx with Shingles to Right flank where pain is and states unable to tolerate PO intake including Valtrex and Tramadol.

## 2019-05-15 NOTE — H&P
History and Physical 
 
Subjective: Mir Jang is a 78 y.o. white female who is being admitted with hyponatremia and R side shingles with inadequate pain control. Pt had presented to the ER 7 days ago with RLQ pain. There was apparently concern for an appendicitis, but w/u for this was negative. Two days later, she developed a zoster rash on her RLQ radiating around to her back. Valtrex was called in. Last night, the shingles pain was severe, and uncontrolled with Tramadol & Hydrocodone. Early this am, she started vomiting & felt worse. Therefore, she came back to the ER where her sodium was 127. She was given some IV Fentanyl -- tolerated this, but pain still an issue. Pt is being admitted for the above. She denies any f/c. No other new c/o's. Past Medical History:  
Diagnosis Date  Diverticulitis  Dystrophy, cornea  Environmental allergies  Family history of skin cancer  GERD (gastroesophageal reflux disease)  Hypotension  Ill-defined condition Hyponatremia  Multiple drug allergies  Osteoporosis  Shingles 05/2019  Sun-damaged skin  Vertebral compression fracture ([address] 01/05/12  Vocal cord anomaly   
 vocal cord dysfunction per speech therapist  
 
Allergies Allergen Reactions  Ceftin [Cefuroxime Axetil] Unknown (comments)  Clindamycin Anaphylaxis Ruthine Ivette [Telithromycin] Anaphylaxis  Levaquin [Levofloxacin] Anaphylaxis  Nexium [Esomeprazole Magnesium] Anaphylaxis  Sudafed [Pseudoephedrine Hcl] Anaphylaxis  Voltaren [Diclofenac Sodium] Other (comments) Mild throat closing and severe nausea  Cortisone Rash Can take depro medrol if preservative free only  Methylprednisolone Rash Facial rash  Mobic [Meloxicam] Other (comments) Throat closing, severe nausea, abd pain and facial swelling  Vibramycin [Doxycycline Calcium] Rash Facial rash  Afrin [Pseudoephedrine Sulfate] Other (comments) Facial numbness that lasted 45 minutes during testing  Antihistamine-1 Anaphylaxis  Azithromycin Other (comments) \"neck stiffness & face numbness & swelling\"  Bactrim [Sulfamethoprim Ds] Rash Facial rash and swelling  Biaxin [Clarithromycin] Rash Severe facial rash and swelling  Ciprofloxacin Other (comments) \"numbness and tingling in foot & leg\"  Codeine Angioedema  Doxycycline Anaphylaxis  Flagyl [Metronidazole] Other (comments) Reports on the 7th day of taking it \"I had SEVERE Cranial pain that was not a headache\"  Gabapentin Other (comments)  Pcn [Penicillins] Anaphylaxis  Restasis [Cyclosporine] Angioedema  Xyzal [Levocetirizine] Anaphylaxis Prior to Admission medications Medication Sig Start Date End Date Taking? Authorizing Provider  
valACYclovir (VALTREX) 1 gram tablet valacyclovir 1 gram tablet Take 1 tablet 3 times a day by oral route. Yes Other, MD Cheng  
acetaminophen (TYLENOL) 325 mg tablet Take 325 mg by mouth every eight (8) hours. Yes Provider, Historical  
therapeutic multivitamin (THERAGRAN) tablet Take 1 Tab by mouth daily. Yes Provider, Historical  
traMADol (ULTRAM) 50 mg tablet Take 1 Tab by mouth two (2) times daily as needed for Pain for up to 180 days. Max Daily Amount: 100 mg. Patient taking differently: Take 100 mg by mouth every eight (8) hours as needed for Pain. 4/26/19 10/23/19 Yes Marybel Mansfield MD  
triamcinolone acetonide (KENALOG) 0.1 % topical cream APPLY TO AFFECTED AREA TWO (2) TIMES DAILY AS NEEDED FOR SKIN IRRITATION. USE THIN LAYER 11/29/18  Yes Marybel Mansfield MD  
CALCIUM CITRATE (CITRACAL PO) Take 1 Tab by mouth two (2) times a day. Yes Provider, Historical  
polyethylene glycol (MIRALAX) 17 gram packet Take 17 g by mouth daily as needed.    Yes Provider, Historical  
docusate sodium (COLACE) 100 mg capsule Take 100 mg by mouth two (2) times daily as needed. Yes Provider, Historical  
cholecalciferol, vitamin D3, 2,000 unit tab Take 1 Tab by mouth daily. Yes Provider, Historical  
cyanocobalamin (VITAMIN B12) 500 mcg tablet Take 500 mcg by mouth daily. Yes Provider, Historical  
loteprednol etabonate (LOTEMAX) 0.5 % ophthalmic suspension Administer 1 Drop to both eyes daily. Yes Provider, Historical  
Dexlansoprazole (DEXILANT) 60 mg CpDB Take 1 Cap by mouth daily. Patient taking differently: Take 60 mg by mouth every fourty-eight (48) hours. 2/29/16   Douglas Vega MD  
 
Social History Tobacco Use  Smoking status: Never Smoker  Smokeless tobacco: Never Used Substance Use Topics  Alcohol use: No  
 
Family History Problem Relation Age of Onset  Heart Disease Mother  Cancer Father 61  
     lung  Cataract Daughter   
     congenital cataracts  Heart Disease Daughter   
     fast heart rythmn Review of Systems: As above. Objective:  
 
 
Physical Exam: In NAD. Unremarkable. HEENT -- Unremarkable. Neck -- Supple. No JVD. Heart -- RRR. No R/M/G. Lungs -- CTA. Abdomen -- Soft. Non-tender. Non-distended. No masses. Benign. There is a zoster rash radiating from her RLQ around to her R back/flank. Extremeties -- No edema. Data Review:  
Recent Results (from the past 24 hour(s)) CBC WITH AUTOMATED DIFF Collection Time: 05/15/19  9:06 AM  
Result Value Ref Range WBC 3.7 3.6 - 11.0 K/uL  
 RBC 4.79 3.80 - 5.20 M/uL  
 HGB 13.9 11.5 - 16.0 g/dL HCT 41.4 35.0 - 47.0 % MCV 86.4 80.0 - 99.0 FL  
 MCH 29.0 26.0 - 34.0 PG  
 MCHC 33.6 30.0 - 36.5 g/dL  
 RDW 12.9 11.5 - 14.5 % PLATELET 288 715 - 842 K/uL MPV 11.0 8.9 - 12.9 FL  
 NRBC 0.0 0  WBC ABSOLUTE NRBC 0.00 0.00 - 0.01 K/uL NEUTROPHILS 68 32 - 75 % BAND NEUTROPHILS 1 0 - 6 % LYMPHOCYTES 13 12 - 49 % MONOCYTES 13 5 - 13 % EOSINOPHILS 5 0 - 7 % BASOPHILS 0 0 - 1 % IMMATURE GRANULOCYTES 0 %  
 ABS. NEUTROPHILS 2.5 1.8 - 8.0 K/UL  
 ABS. LYMPHOCYTES 0.5 (L) 0.8 - 3.5 K/UL  
 ABS. MONOCYTES 0.5 0.0 - 1.0 K/UL  
 ABS. EOSINOPHILS 0.2 0.0 - 0.4 K/UL  
 ABS. BASOPHILS 0.0 0.0 - 0.1 K/UL  
 ABS. IMM. GRANS. 0.0 K/UL  
 DF MANUAL    
 RBC COMMENTS NORMOCYTIC, NORMOCHROMIC METABOLIC PANEL, COMPREHENSIVE Collection Time: 05/15/19  9:06 AM  
Result Value Ref Range Sodium 127 (L) 136 - 145 mmol/L Potassium 4.2 3.5 - 5.1 mmol/L Chloride 95 (L) 97 - 108 mmol/L  
 CO2 22 21 - 32 mmol/L Anion gap 10 5 - 15 mmol/L Glucose 101 (H) 65 - 100 mg/dL BUN 6 6 - 20 MG/DL Creatinine 0.64 0.55 - 1.02 MG/DL  
 BUN/Creatinine ratio 9 (L) 12 - 20 GFR est AA >60 >60 ml/min/1.73m2 GFR est non-AA >60 >60 ml/min/1.73m2 Calcium 9.4 8.5 - 10.1 MG/DL Bilirubin, total 0.6 0.2 - 1.0 MG/DL  
 ALT (SGPT) 20 12 - 78 U/L  
 AST (SGOT) 28 15 - 37 U/L Alk. phosphatase 62 45 - 117 U/L Protein, total 7.8 6.4 - 8.2 g/dL Albumin 4.0 3.5 - 5.0 g/dL Globulin 3.8 2.0 - 4.0 g/dL A-G Ratio 1.1 1.1 - 2.2 LIPASE Collection Time: 05/15/19  9:06 AM  
Result Value Ref Range Lipase 106 73 - 393 U/L  
URINALYSIS W/MICROSCOPIC Collection Time: 05/15/19  9:06 AM  
Result Value Ref Range Color YELLOW/STRAW Appearance CLOUDY (A) CLEAR Specific gravity 1.016 1.003 - 1.030    
 pH (UA) 8.5 (H) 5.0 - 8.0 Protein NEGATIVE  NEG mg/dL Glucose NEGATIVE  NEG mg/dL Ketone 15 (A) NEG mg/dL Bilirubin NEGATIVE  NEG Blood NEGATIVE  NEG Urobilinogen 0.2 0.2 - 1.0 EU/dL Nitrites NEGATIVE  NEG Leukocyte Esterase NEGATIVE  NEG    
 WBC 0-4 0 - 4 /hpf  
 RBC 0-5 0 - 5 /hpf Epithelial cells FEW FEW /lpf Bacteria NEGATIVE  NEG /hpf Hyaline cast 0-2 0 - 5 /lpf URINE CULTURE HOLD SAMPLE Collection Time: 05/15/19  9:06 AM  
Result Value Ref Range Urine culture hold URINE ON HOLD IN MICROBIOLOGY DEPT FOR 3 DAYS. IF UNPRESERVED URINE IS SUBMITTED, IT CANNOT BE USED FOR ADDITIONAL TESTING AFTER 24 HRS, RECOLLECTION WILL BE REQUIRED. SAMPLES BEING HELD Collection Time: 05/15/19  9:07 AM  
Result Value Ref Range SAMPLES BEING HELD 1BL,1LV,1RD,1PST COMMENT Add-on orders for these samples will be processed based on acceptable specimen integrity and analyte stability, which may vary by analyte. Assessment:  
 
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 Active Problems: 
  Herpes zoster Intractable pain due to shingles. Plan: 1. PRN IV Fentanyl & PRN PO Percocet. 2.  Cont PO Valtrex. 3.  Gentle NS IVF & antiemetics. 4.  Check serum & urine osms. 5.  Lovenox for DVT prophylaxis. D/w dtr, present. Signed By: Monica Almazan MD   
 May 15, 2019

## 2019-05-15 NOTE — PROGRESS NOTES
Admission Medication Reconciliation: 
Information obtained from: Patient, Cristobal General Significant PMH/Disease States:  
Past Medical History:  
Diagnosis Date Diverticulitis Dystrophy, cornea Environmental allergies Family history of skin cancer GERD (gastroesophageal reflux disease) Hypotension Ill-defined condition Hyponatremia Multiple drug allergies Osteoporosis Shingles 05/2019 Sun-damaged skin Vertebral compression fracture ([address] 01/05/12 Vocal cord anomaly   
 vocal cord dysfunction per speech therapist  
 
 
Chief Complaint for this Admission:  Shingles, flank pain Allergies:  Ceftin [cefuroxime axetil]; Clindamycin; Ketek [telithromycin]; Levaquin [levofloxacin]; Nexium [esomeprazole magnesium]; Sudafed [pseudoephedrine hcl]; Voltaren [diclofenac sodium]; Cortisone; Methylprednisolone; Mobic [meloxicam]; Vibramycin [doxycycline calcium]; Afrin [pseudoephedrine sulfate]; Antihistamine-1; Azithromycin; Bactrim [sulfamethoprim ds]; Biaxin [clarithromycin]; Ciprofloxacin; Codeine; Doxycycline; Flagyl [metronidazole]; Gabapentin; Pcn [penicillins]; Restasis [cyclosporine]; and Xyzal [levocetirizine] Prior to Admission Medications:  
Prior to Admission Medications Prescriptions Last Dose Informant Patient Reported? Taking? CALCIUM CITRATE (CITRACAL PO) 5/14/2019 at Unknown time  Yes Yes Sig: Take 1 Tab by mouth two (2) times a day. Dexlansoprazole (DEXILANT) 60 mg CpDB 5/13/2019  No No  
Sig: Take 1 Cap by mouth daily. Patient taking differently: Take 60 mg by mouth every fourty-eight (48) hours. acetaminophen (TYLENOL) 325 mg tablet 5/14/2019 at Unknown time  Yes Yes Sig: Take 325 mg by mouth every eight (8) hours. cholecalciferol, vitamin D3, 2,000 unit tab 5/14/2019 at Unknown time  Yes Yes Sig: Take 1 Tab by mouth daily. cyanocobalamin (VITAMIN B12) 500 mcg tablet 5/14/2019 at Unknown time  Yes Yes Sig: Take 500 mcg by mouth daily. docusate sodium (COLACE) 100 mg capsule 5/14/2019 at Unknown time  Yes Yes Sig: Take 100 mg by mouth two (2) times daily as needed. loteprednol etabonate (LOTEMAX) 0.5 % ophthalmic suspension 5/15/2019 at Unknown time  Yes Yes Sig: Administer 1 Drop to both eyes daily. polyethylene glycol (MIRALAX) 17 gram packet 5/8/2019 at Unknown time  Yes Yes Sig: Take 17 g by mouth daily as needed. therapeutic multivitamin (THERAGRAN) tablet 5/14/2019 at Unknown time  Yes Yes Sig: Take 1 Tab by mouth daily. traMADol (ULTRAM) 50 mg tablet 5/14/2019 at Unknown time  No Yes Sig: Take 1 Tab by mouth two (2) times daily as needed for Pain for up to 180 days. Max Daily Amount: 100 mg. Patient taking differently: Take 100 mg by mouth every eight (8) hours as needed for Pain.  
triamcinolone acetonide (KENALOG) 0.1 % topical cream 5/14/2019 at Unknown time  No Yes Sig: APPLY TO AFFECTED AREA TWO (2) TIMES DAILY AS NEEDED FOR SKIN IRRITATION. USE THIN LAYER  
valACYclovir (VALTREX) 1 gram tablet 5/14/2019 at Unknown time  Yes Yes Sig: valacyclovir 1 gram tablet Take 1 tablet 3 times a day by oral route. Facility-Administered Medications: None Comments/Recommendations: Patient provided detailed history for each allergy and medication. Notes: 
Valacyclovir: started 5/11/2019 for shingles outbreak Tramadol dose recently increased to 100 mg TID PRN due to shingles pain Added: APAP Revised: 
Tramadol Dexlansoprazole (takes every other day, to wean off) Lotemax: takes every day, to wean off Deleted: 
Gabapentin (not yet started)-for shingles pain, wants to talk with MD before starting Prednisone (not yet started)-concerned for \"fillers\" (due to so many allergies) Thank you for allowing me to participate in the care of your patient. Manuel ChaoD, RN #[id number]

## 2019-05-15 NOTE — ED NOTES
Pt repositioned in bed with 3 pillows. 11:41 AM 
Pt assisted onto bedpan, repositioned back in bed. 1:35 PM 
Pt assisted to restroom, settled back into chair. Given saltines to try.

## 2019-05-16 ENCOUNTER — APPOINTMENT (OUTPATIENT)
Dept: GENERAL RADIOLOGY | Age: 79
DRG: 644 | End: 2019-05-16
Attending: INTERNAL MEDICINE
Payer: MEDICARE

## 2019-05-16 LAB
ANION GAP SERPL CALC-SCNC: 9 MMOL/L (ref 5–15)
BUN SERPL-MCNC: 6 MG/DL (ref 6–20)
BUN/CREAT SERPL: 11 (ref 12–20)
CALCIUM SERPL-MCNC: 8.2 MG/DL (ref 8.5–10.1)
CHLORIDE SERPL-SCNC: 98 MMOL/L (ref 97–108)
CO2 SERPL-SCNC: 22 MMOL/L (ref 21–32)
CREAT SERPL-MCNC: 0.56 MG/DL (ref 0.55–1.02)
GLUCOSE SERPL-MCNC: 111 MG/DL (ref 65–100)
POTASSIUM SERPL-SCNC: 3.4 MMOL/L (ref 3.5–5.1)
SODIUM SERPL-SCNC: 129 MMOL/L (ref 136–145)

## 2019-05-16 PROCEDURE — 74011250636 HC RX REV CODE- 250/636: Performed by: INTERNAL MEDICINE

## 2019-05-16 PROCEDURE — 65270000029 HC RM PRIVATE

## 2019-05-16 PROCEDURE — 74011250637 HC RX REV CODE- 250/637: Performed by: INTERNAL MEDICINE

## 2019-05-16 PROCEDURE — 80048 BASIC METABOLIC PNL TOTAL CA: CPT

## 2019-05-16 PROCEDURE — 74011000250 HC RX REV CODE- 250: Performed by: INTERNAL MEDICINE

## 2019-05-16 PROCEDURE — 71046 X-RAY EXAM CHEST 2 VIEWS: CPT

## 2019-05-16 PROCEDURE — 36415 COLL VENOUS BLD VENIPUNCTURE: CPT

## 2019-05-16 RX ORDER — PROCHLORPERAZINE 25 MG
12.5 SUPPOSITORY, RECTAL RECTAL
Status: DISCONTINUED | OUTPATIENT
Start: 2019-05-16 | End: 2019-05-17 | Stop reason: HOSPADM

## 2019-05-16 RX ORDER — SODIUM CHLORIDE AND POTASSIUM CHLORIDE .9; .15 G/100ML; G/100ML
SOLUTION INTRAVENOUS CONTINUOUS
Status: DISCONTINUED | OUTPATIENT
Start: 2019-05-16 | End: 2019-05-17 | Stop reason: HOSPADM

## 2019-05-16 RX ORDER — OXYCODONE AND ACETAMINOPHEN 7.5; 325 MG/1; MG/1
1-2 TABLET ORAL
Status: DISCONTINUED | OUTPATIENT
Start: 2019-05-16 | End: 2019-05-17 | Stop reason: HOSPADM

## 2019-05-16 RX ORDER — PROCHLORPERAZINE MALEATE 5 MG
10 TABLET ORAL
Status: DISCONTINUED | OUTPATIENT
Start: 2019-05-16 | End: 2019-05-17 | Stop reason: HOSPADM

## 2019-05-16 RX ADMIN — VALACYCLOVIR HYDROCHLORIDE 1000 MG: 500 TABLET, FILM COATED ORAL at 15:50

## 2019-05-16 RX ADMIN — DOCUSATE SODIUM 100 MG: 100 CAPSULE, LIQUID FILLED ORAL at 09:41

## 2019-05-16 RX ADMIN — VALACYCLOVIR HYDROCHLORIDE 1000 MG: 500 TABLET, FILM COATED ORAL at 09:41

## 2019-05-16 RX ADMIN — ACETAMINOPHEN 650 MG: 325 TABLET ORAL at 00:06

## 2019-05-16 RX ADMIN — VITAMIN D, TAB 1000IU (100/BT) 2000 UNITS: 25 TAB at 09:41

## 2019-05-16 RX ADMIN — OXYCODONE HYDROCHLORIDE AND ACETAMINOPHEN 2 TABLET: 5; 325 TABLET ORAL at 05:28

## 2019-05-16 RX ADMIN — DOCUSATE SODIUM 100 MG: 100 CAPSULE, LIQUID FILLED ORAL at 18:44

## 2019-05-16 RX ADMIN — PANTOPRAZOLE SODIUM 40 MG: 40 TABLET, DELAYED RELEASE ORAL at 07:54

## 2019-05-16 RX ADMIN — LOTEPREDNOL ETABONATE 1 DROP: 5 SUSPENSION/ DROPS OPHTHALMIC at 11:17

## 2019-05-16 RX ADMIN — POLYETHYLENE GLYCOL 3350 17 G: 17 POWDER, FOR SOLUTION ORAL at 14:28

## 2019-05-16 RX ADMIN — THERA TABS 1 TABLET: TAB at 09:41

## 2019-05-16 RX ADMIN — OXYCODONE HYDROCHLORIDE AND ACETAMINOPHEN 1 TABLET: 5; 325 TABLET ORAL at 09:48

## 2019-05-16 RX ADMIN — VALACYCLOVIR HYDROCHLORIDE 1000 MG: 500 TABLET, FILM COATED ORAL at 22:59

## 2019-05-16 RX ADMIN — SODIUM CHLORIDE AND POTASSIUM CHLORIDE: 9; 1.49 INJECTION, SOLUTION INTRAVENOUS at 23:14

## 2019-05-16 RX ADMIN — OXYCODONE HYDROCHLORIDE AND ACETAMINOPHEN 1 TABLET: 7.5; 325 TABLET ORAL at 14:28

## 2019-05-16 RX ADMIN — OXYCODONE HYDROCHLORIDE AND ACETAMINOPHEN 1 TABLET: 5; 325 TABLET ORAL at 01:22

## 2019-05-16 RX ADMIN — OXYCODONE HYDROCHLORIDE AND ACETAMINOPHEN 1 TABLET: 7.5; 325 TABLET ORAL at 18:44

## 2019-05-16 RX ADMIN — OXYCODONE HYDROCHLORIDE AND ACETAMINOPHEN 1 TABLET: 7.5; 325 TABLET ORAL at 22:59

## 2019-05-16 RX ADMIN — SODIUM CHLORIDE AND POTASSIUM CHLORIDE: 9; 1.49 INJECTION, SOLUTION INTRAVENOUS at 11:16

## 2019-05-16 RX ADMIN — PROCHLORPERAZINE EDISYLATE 10 MG: 5 INJECTION INTRAMUSCULAR; INTRAVENOUS at 00:54

## 2019-05-16 RX ADMIN — OXYCODONE HYDROCHLORIDE AND ACETAMINOPHEN 1 TABLET: 5; 325 TABLET ORAL at 09:41

## 2019-05-16 NOTE — PROGRESS NOTES
Care Management Interventions PCP Verified by CM: Yes Mode of Transport at Discharge: Other (see comment)(family) Transition of Care Consult (CM Consult): Discharge Planning MyChart Signup: No 
Discharge Durable Medical Equipment: No 
Physical Therapy Consult: No 
Occupational Therapy Consult: No 
Speech Therapy Consult: No 
Current Support Network: Own Home, Lives Alone, Family Lives Nearby(patient has supportive daughter Arturo Mora who lives locally and will be staying with patient post discharge) Confirm Follow Up Transport: Family Plan discussed with Pt/Family/Caregiver: Yes Discharge Location Discharge Placement: Home Reason for Admission: hyponatremia and R side shingles RRAT Score: 5 Plan for utilizing home health:    No orders. Current Advanced Directive/Advance Care Plan:  Not on file Likelihood of Readmission:  Low Transition of Care Plan:  Home. CM met with patient and daughter at bedside. Patient lives alone and is independent with ADLs. Daughter plans to stay with patient post discharge. Plan is for chest x-ray today. Anticipate possible discharge home tomorrow. Daughter will provide transportation at discharge. ADRIANE Jhaveri/CRM

## 2019-05-16 NOTE — PROGRESS NOTES
Bedside and Verbal shift change report given to Cathy8 S  25 (oncoming nurse) by Edwin Khalil (offgoing nurse). Report included the following information SBAR, Kardex, Intake/Output, MAR, Accordion and Recent Results.

## 2019-05-16 NOTE — PROGRESS NOTES
's Minor Magaly Morales Admit Date: 5/15/2019 Subjective:  
 
Pain better controlled with Percocet. Nausea an issue, even with Zofran; but better with Compazine. No new complaints. Current Facility-Administered Medications Medication Dose Route Frequency  prochlorperazine (COMPAZINE) tablet 10 mg  10 mg Oral Q6H PRN  prochlorperazine (COMPAZINE) suppository 12.5 mg  12.5 mg Rectal Q8H PRN  
 0.9% sodium chloride with KCl 20 mEq/L infusion   IntraVENous CONTINUOUS  cholecalciferol (VITAMIN D3) tablet 2,000 Units  2,000 Units Oral DAILY  loteprednol etabonate (LOTEMAX) 0.5 % ophthalmic suspension 1 Drop  1 Drop Both Eyes DAILY  polyethylene glycol (MIRALAX) packet 17 g  17 g Oral DAILY PRN  
 docusate sodium (COLACE) capsule 100 mg  100 mg Oral BID  therapeutic multivitamin (THERAGRAN) tablet 1 Tab  1 Tab Oral DAILY  valACYclovir (VALTREX) tablet 1,000 mg  1,000 mg Oral TID  ondansetron (ZOFRAN) injection 4 mg  4 mg IntraVENous Q6H PRN  
 acetaminophen (TYLENOL) tablet 650 mg  650 mg Oral Q4H PRN  
 oxyCODONE-acetaminophen (PERCOCET) 5-325 mg per tablet 1-2 Tab  1-2 Tab Oral Q4H PRN  
 fentaNYL citrate (PF) injection 75 mcg  75 mcg IntraVENous Q3H PRN  
 enoxaparin (LOVENOX) injection 40 mg  40 mg SubCUTAneous Q24H  pantoprazole (PROTONIX) tablet 40 mg  40 mg Oral ACB Objective:  
 
Patient Vitals for the past 8 hrs: 
 BP Temp Pulse Resp SpO2  
05/16/19 0445 112/66 98 °F (36.7 °C) 67 16 96 % 05/16/19 0106 106/59  63   No intake/output data recorded. 05/14 1901 - 05/16 0700 In: 2000 [I.V.:2000] Out: 500 [Urine:500] Physical Exam: NAD. A&O. Neck -- Supple. No JVD. Heart -- RRR. Lungs -- CTA. Abd -- Benign. Ext -- No LE edema, b/l. Data Review Recent Results (from the past 24 hour(s)) CBC WITH AUTOMATED DIFF Collection Time: 05/15/19  9:06 AM  
Result Value Ref Range WBC 3.7 3.6 - 11.0 K/uL  
 RBC 4.79 3.80 - 5.20 M/uL  
 HGB 13.9 11.5 - 16.0 g/dL HCT 41.4 35.0 - 47.0 % MCV 86.4 80.0 - 99.0 FL  
 MCH 29.0 26.0 - 34.0 PG  
 MCHC 33.6 30.0 - 36.5 g/dL  
 RDW 12.9 11.5 - 14.5 % PLATELET 718 373 - 968 K/uL MPV 11.0 8.9 - 12.9 FL  
 NRBC 0.0 0  WBC ABSOLUTE NRBC 0.00 0.00 - 0.01 K/uL NEUTROPHILS 68 32 - 75 % BAND NEUTROPHILS 1 0 - 6 % LYMPHOCYTES 13 12 - 49 % MONOCYTES 13 5 - 13 % EOSINOPHILS 5 0 - 7 % BASOPHILS 0 0 - 1 % IMMATURE GRANULOCYTES 0 %  
 ABS. NEUTROPHILS 2.5 1.8 - 8.0 K/UL  
 ABS. LYMPHOCYTES 0.5 (L) 0.8 - 3.5 K/UL  
 ABS. MONOCYTES 0.5 0.0 - 1.0 K/UL  
 ABS. EOSINOPHILS 0.2 0.0 - 0.4 K/UL  
 ABS. BASOPHILS 0.0 0.0 - 0.1 K/UL  
 ABS. IMM. GRANS. 0.0 K/UL  
 DF MANUAL    
 RBC COMMENTS NORMOCYTIC, NORMOCHROMIC METABOLIC PANEL, COMPREHENSIVE Collection Time: 05/15/19  9:06 AM  
Result Value Ref Range Sodium 127 (L) 136 - 145 mmol/L Potassium 4.2 3.5 - 5.1 mmol/L Chloride 95 (L) 97 - 108 mmol/L  
 CO2 22 21 - 32 mmol/L Anion gap 10 5 - 15 mmol/L Glucose 101 (H) 65 - 100 mg/dL BUN 6 6 - 20 MG/DL Creatinine 0.64 0.55 - 1.02 MG/DL  
 BUN/Creatinine ratio 9 (L) 12 - 20 GFR est AA >60 >60 ml/min/1.73m2 GFR est non-AA >60 >60 ml/min/1.73m2 Calcium 9.4 8.5 - 10.1 MG/DL Bilirubin, total 0.6 0.2 - 1.0 MG/DL  
 ALT (SGPT) 20 12 - 78 U/L  
 AST (SGOT) 28 15 - 37 U/L Alk. phosphatase 62 45 - 117 U/L Protein, total 7.8 6.4 - 8.2 g/dL Albumin 4.0 3.5 - 5.0 g/dL Globulin 3.8 2.0 - 4.0 g/dL A-G Ratio 1.1 1.1 - 2.2 LIPASE Collection Time: 05/15/19  9:06 AM  
Result Value Ref Range Lipase 106 73 - 393 U/L  
URINALYSIS W/MICROSCOPIC Collection Time: 05/15/19  9:06 AM  
Result Value Ref Range Color YELLOW/STRAW  Appearance CLOUDY (A) CLEAR    
 Specific gravity 1.016 1.003 - 1.030    
 pH (UA) 8.5 (H) 5.0 - 8.0 Protein NEGATIVE  NEG mg/dL Glucose NEGATIVE  NEG mg/dL Ketone 15 (A) NEG mg/dL Bilirubin NEGATIVE  NEG Blood NEGATIVE  NEG Urobilinogen 0.2 0.2 - 1.0 EU/dL Nitrites NEGATIVE  NEG Leukocyte Esterase NEGATIVE  NEG    
 WBC 0-4 0 - 4 /hpf  
 RBC 0-5 0 - 5 /hpf Epithelial cells FEW FEW /lpf Bacteria NEGATIVE  NEG /hpf Hyaline cast 0-2 0 - 5 /lpf URINE CULTURE HOLD SAMPLE Collection Time: 05/15/19  9:06 AM  
Result Value Ref Range Urine culture hold URINE ON HOLD IN MICROBIOLOGY DEPT FOR 3 DAYS. IF UNPRESERVED URINE IS SUBMITTED, IT CANNOT BE USED FOR ADDITIONAL TESTING AFTER 24 HRS, RECOLLECTION WILL BE REQUIRED. OSMOLALITY, UR Collection Time: 05/15/19  9:06 AM  
Result Value Ref Range Osmolality,urine 468 MOSM/kg H2O  
SAMPLES BEING HELD Collection Time: 05/15/19  9:07 AM  
Result Value Ref Range SAMPLES BEING HELD 1BL,1LV,1RD,1PST COMMENT Add-on orders for these samples will be processed based on acceptable specimen integrity and analyte stability, which may vary by analyte. OSMOLALITY, SERUM/PLASMA Collection Time: 05/15/19  9:07 AM  
Result Value Ref Range Osmolality, serum/plasma 268 mOsm/kg K8W  
METABOLIC PANEL, BASIC Collection Time: 05/16/19  1:05 AM  
Result Value Ref Range Sodium 129 (L) 136 - 145 mmol/L Potassium 3.4 (L) 3.5 - 5.1 mmol/L Chloride 98 97 - 108 mmol/L  
 CO2 22 21 - 32 mmol/L Anion gap 9 5 - 15 mmol/L Glucose 111 (H) 65 - 100 mg/dL BUN 6 6 - 20 MG/DL Creatinine 0.56 0.55 - 1.02 MG/DL  
 BUN/Creatinine ratio 11 (L) 12 - 20 GFR est AA >60 >60 ml/min/1.73m2 GFR est non-AA >60 >60 ml/min/1.73m2 Calcium 8.2 (L) 8.5 - 10.1 MG/DL Assessment:  
 
Principal Problem: Hyponatremia -- Urine osms suggest SIADH. Active Problems: 
  Herpes zoster without complication (7/27/7729) Intractable pain due to zoster. Nausea Plan: 1. PO fluid restrictions. 2. Cont gentle NS IVF (add KCl). 3. CXR today due to SIADH. 4. Cont PRN Percocet & PRN Compazine. 5. If we can control her sx's with PO meds, and if her sodium does not go down, she should be able to be discharged home tomorrow. D/w dtr, present.  
 
 
 
Javad Corrales MD

## 2019-05-16 NOTE — PROGRESS NOTES
Bedside and Verbal shift change report given to El Au (oncoming nurse) by Lennox Newborn (offgoing nurse). Report included the following information SBAR, Kardex, Intake/Output, MAR and Recent Results.

## 2019-05-16 NOTE — PROGRESS NOTES
Bedside and Verbal shift change report given to Hernandez Stack (oncoming nurse) by Mali Moralez (offgoing nurse). Report included the following information SBAR, Kardex, Procedure Summary, Intake/Output, MAR and Recent Results.

## 2019-05-16 NOTE — PROGRESS NOTES
Paged Dr. Jo Davalos regarding patient's unrelieved nausea with Zofran and hypotension. He advised to hold percocet and continue tylenol 650mg Q4 hours until BP improves and ordered Compazine 10mg IV Q6 hours prn nausea.

## 2019-05-16 NOTE — PROGRESS NOTES
Primary Nurse Charlotte Jackson and Keyla Balbuena RN performed a dual skin assessment on this patient No impairment noted Shaka score is 21 Pt has shingles on right side of abdomen to right flank.

## 2019-05-17 VITALS
RESPIRATION RATE: 16 BRPM | OXYGEN SATURATION: 94 % | WEIGHT: 134 LBS | SYSTOLIC BLOOD PRESSURE: 119 MMHG | HEIGHT: 62 IN | HEART RATE: 79 BPM | DIASTOLIC BLOOD PRESSURE: 78 MMHG | BODY MASS INDEX: 24.66 KG/M2 | TEMPERATURE: 98.2 F

## 2019-05-17 LAB
ANION GAP SERPL CALC-SCNC: 7 MMOL/L (ref 5–15)
BUN SERPL-MCNC: 4 MG/DL (ref 6–20)
BUN/CREAT SERPL: 7 (ref 12–20)
CALCIUM SERPL-MCNC: 8.4 MG/DL (ref 8.5–10.1)
CHLORIDE SERPL-SCNC: 107 MMOL/L (ref 97–108)
CO2 SERPL-SCNC: 24 MMOL/L (ref 21–32)
CREAT SERPL-MCNC: 0.57 MG/DL (ref 0.55–1.02)
GLUCOSE SERPL-MCNC: 97 MG/DL (ref 65–100)
POTASSIUM SERPL-SCNC: 3.9 MMOL/L (ref 3.5–5.1)
SODIUM SERPL-SCNC: 138 MMOL/L (ref 136–145)

## 2019-05-17 PROCEDURE — 74011250637 HC RX REV CODE- 250/637: Performed by: INTERNAL MEDICINE

## 2019-05-17 PROCEDURE — 36415 COLL VENOUS BLD VENIPUNCTURE: CPT

## 2019-05-17 PROCEDURE — 80048 BASIC METABOLIC PNL TOTAL CA: CPT

## 2019-05-17 RX ORDER — PROCHLORPERAZINE MALEATE 10 MG
10 TABLET ORAL
Qty: 20 TAB | Refills: 2 | Status: SHIPPED | OUTPATIENT
Start: 2019-05-17 | End: 2019-08-20

## 2019-05-17 RX ORDER — OXYCODONE AND ACETAMINOPHEN 7.5; 325 MG/1; MG/1
1-2 TABLET ORAL
Qty: 40 TAB | Refills: 0 | Status: SHIPPED | OUTPATIENT
Start: 2019-05-17 | End: 2019-05-23 | Stop reason: SDUPTHER

## 2019-05-17 RX ADMIN — PANTOPRAZOLE SODIUM 40 MG: 40 TABLET, DELAYED RELEASE ORAL at 08:05

## 2019-05-17 RX ADMIN — OXYCODONE HYDROCHLORIDE AND ACETAMINOPHEN 1 TABLET: 7.5; 325 TABLET ORAL at 04:19

## 2019-05-17 RX ADMIN — THERA TABS 1 TABLET: TAB at 09:43

## 2019-05-17 RX ADMIN — LOTEPREDNOL ETABONATE 1 DROP: 5 SUSPENSION/ DROPS OPHTHALMIC at 09:00

## 2019-05-17 RX ADMIN — DOCUSATE SODIUM 100 MG: 100 CAPSULE, LIQUID FILLED ORAL at 09:43

## 2019-05-17 RX ADMIN — VALACYCLOVIR HYDROCHLORIDE 1000 MG: 500 TABLET, FILM COATED ORAL at 09:43

## 2019-05-17 RX ADMIN — OXYCODONE HYDROCHLORIDE AND ACETAMINOPHEN 1 TABLET: 7.5; 325 TABLET ORAL at 08:06

## 2019-05-17 RX ADMIN — VITAMIN D, TAB 1000IU (100/BT) 2000 UNITS: 25 TAB at 09:43

## 2019-05-17 NOTE — PROGRESS NOTES
Spiritual Care Assessment/Progress Note [address] 
 
 
NAME: Sampson Larios      MRN: [id number] AGE: 78 y.o. SEX: female Muslim Affiliation: Yazidism  
Language: [place] 5/17/2019     Total Time (in minutes): 20 Spiritual Assessment begun in [facility] through conversation with: 
  
    [x]Patient        [x] Family    [] Friend(s) Reason for Consult: Initial visit Spiritual beliefs: (Please include comment if needed) [x] Identifies with a melanie tradition:     
   [] Supported by a melanie community:        
   [] Claims no spiritual orientation:       
   [] Seeking spiritual identity:            
   [] Adheres to an individual form of spirituality:       
   [] Not able to assess:                   
 
    
Identified resources for coping:  
   [x] Prayer                           
   [] Music                  [] Guided Imagery [x] Family/friends                 [] Pet visits [] Devotional reading                         [] Unknown 
   [] Other:                                          
 
 
Interventions offered during this visit: (See comments for more details) Patient Interventions: Affirmation of emotions/emotional suffering, Affirmation of melanie, Normalization of emotional/spiritual concerns Family/Friend(s): Affirmation of emotions/emotional suffering, Affirmation of melanie, Normalization of emotional/spiritual concerns Plan of Care: 
 
 [] Support spiritual and/or cultural needs  
 [] Support AMD and/or advance care planning process    
 [] Support grieving process 
 [] Coordinate Rites and/or Rituals  
 [] Coordination with community clergy [] No spiritual needs identified at this time 
 [] Detailed Plan of Care below (See Comments)  [] Make referral to Music Therapy 
[] Make referral to Pet Therapy    
[] Make referral to Addiction services 
[] Make referral to [facility] 
[] Make referral to Spiritual Care Partner [] No future visits requested       
[x] Follow up visits as needed Comments:  for initial visit. Pt was up walking around room and daughter, Pedro Luis Chu was present. Pt was looking forward to getting home and feeling better about having the right medicine and right dose.  provided pastoral listening and support. Please contact [phone] [facility] for further support.  follow up as needed. Garrison Mojica, MACE 
 [phone])

## 2019-05-17 NOTE — PROGRESS NOTES
I have reviewed discharge instructions with the patient. The patient verbalized understanding. IV removed. Patient leaving with prescriptions, belongings, and discharge instructions. No further questions at this time.

## 2019-05-17 NOTE — DISCHARGE INSTRUCTIONS
Patient Discharge Instructions    Lacy Ramya / [id number] : 1940    Admitted 5/15/2019 Discharged: 2019     Take Home Medications       · It is important that you take the medication exactly as they are prescribed. · Keep your medication in the bottles provided by the pharmacist and keep a list of the medication names, dosages, and times to be taken in your wallet. · Do not take other medications without consulting your doctor. What to do at Home    Recommended diet: Regular Diet. Oral fluid restriction:  1,200 ml per 24 hours. Recommended activity: Activity as tolerated. Follow-up with Dr. Marion Burt as previously scheduled/planned. Information obtained by :  I understand that if any problems occur once I am at home I am to contact my physician. I understand and acknowledge receipt of the instructions indicated above.                                                                                                                                            Physician's or R.N.'s Signature                                                                  Date/Time                                                                                                                                              Patient or Representative Signature                                                          Date/Time

## 2019-05-17 NOTE — PROGRESS NOTES
Bedside and Verbal shift change report given to [facility] (oncoming nurse) by Aramis Briggs (offgoing nurse). Report included the following information SBAR, Kardex, Procedure Summary, Intake/Output, MAR and Recent Results.

## 2019-05-17 NOTE — DISCHARGE SUMMARY
Physician Discharge Summary     Patient ID:  Alejandra Ashley  [id number]  53 y.o.  1940    Admit date: 5/15/2019    Discharge date and time: 5/17/2019    Briefly, pt was admitted with Hyponatremia [E87.1]. For details of admission, see H&P. Hospital Course:  Pt was admitted with hyponatremia and intractable pain due to acute shingles of her RLQ radiating to her R flank. Labs suggested SIADH, so PO fluid restrictions were started. She was given gentle NS IVF, and her sodium normalized. Her pain was eventually controlled with Percocet 7.5. Discharge Dx: Hyponatremia due to SIADH, acute shingles with intractable pain. Condition at discharge: improved. Disposition: home    Patient Instructions:   Current Discharge Medication List      START taking these medications    Details   oxyCODONE-acetaminophen (PERCOCET 7.5) 7.5-325 mg per tablet Take 1-2 Tabs by mouth every four (4) hours as needed for Pain for up to 30 days. Max Daily Amount: 12 Tabs. Qty: 40 Tab, Refills: 0    Associated Diagnoses: Intractable pain      prochlorperazine (COMPAZINE) 10 mg tablet Take 1 Tab by mouth every six (6) hours as needed for Nausea. Qty: 20 Tab, Refills: 2         CONTINUE these medications which have NOT CHANGED    Details   acetaminophen (TYLENOL) 325 mg tablet Take 325 mg by mouth every eight (8) hours. therapeutic multivitamin (THERAGRAN) tablet Take 1 Tab by mouth daily. traMADol (ULTRAM) 50 mg tablet Take 1 Tab by mouth two (2) times daily as needed for Pain for up to 180 days. Max Daily Amount: 100 mg. Qty: 80 Tab, Refills: 1    Associated Diagnoses: Chronic back pain, unspecified back location, unspecified back pain laterality      triamcinolone acetonide (KENALOG) 0.1 % topical cream APPLY TO AFFECTED AREA TWO (2) TIMES DAILY AS NEEDED FOR SKIN IRRITATION.  USE THIN LAYER  Qty: 80 g, Refills: 1    Associated Diagnoses: Rash      CALCIUM CITRATE (CITRACAL PO) Take 1 Tab by mouth two (2) times a day.      polyethylene glycol (MIRALAX) 17 gram packet Take 17 g by mouth daily as needed. docusate sodium (COLACE) 100 mg capsule Take 100 mg by mouth two (2) times daily as needed. cholecalciferol, vitamin D3, 2,000 unit tab Take 1 Tab by mouth daily. cyanocobalamin (VITAMIN B12) 500 mcg tablet Take 500 mcg by mouth daily. loteprednol etabonate (LOTEMAX) 0.5 % ophthalmic suspension Administer 1 Drop to both eyes daily. Dexlansoprazole (DEXILANT) 60 mg CpDB Take 1 Cap by mouth daily. Qty: 30 Cap, Refills: 0         STOP taking these medications       valACYclovir (VALTREX) 1 gram tablet Comments:   Reason for Stopping: Follow-up with Dr. Jaycee Gupta as previously scheduled/planned.         Signed:  Osmin Pardo MD  5/17/2019  7:36 AM

## 2019-05-17 NOTE — PROGRESS NOTES
Pain controlled, no more nausea. Na+ nl now. Home today with PRN Percocet & PRN PO Compazine. We discussed 1,200 ml PO fluid restriction.

## 2019-08-20 ENCOUNTER — HOSPITAL ENCOUNTER (OUTPATIENT)
Dept: GENERAL RADIOLOGY | Age: 79
Discharge: HOME | End: 2019-08-20
Attending: INTERNAL MEDICINE
Payer: MEDICARE

## 2019-08-20 DIAGNOSIS — R14.0 BLOATING: ICD-10-CM

## 2019-08-20 PROCEDURE — 74018 RADEX ABDOMEN 1 VIEW: CPT

## 2019-10-14 ENCOUNTER — HOSPITAL ENCOUNTER (OUTPATIENT)
Dept: ULTRASOUND IMAGING | Age: 79
Discharge: HOME | End: 2019-10-14
Attending: NURSE PRACTITIONER
Payer: MEDICARE

## 2019-10-14 DIAGNOSIS — R14.0 ABDOMINAL DISTENTION: ICD-10-CM

## 2019-10-14 DIAGNOSIS — R63.5 ABNORMAL WEIGHT GAIN: ICD-10-CM

## 2019-10-14 PROCEDURE — 76700 US EXAM ABDOM COMPLETE: CPT

## 2019-11-04 ENCOUNTER — HOSPITAL ENCOUNTER (OUTPATIENT)
Dept: CT IMAGING | Age: 79
Discharge: HOME | End: 2019-11-04
Attending: SPECIALIST
Payer: MEDICARE

## 2019-11-04 DIAGNOSIS — R10.84 GENERALIZED ABDOMINAL PAIN: ICD-10-CM

## 2019-11-04 DIAGNOSIS — R63.5 ABNORMAL WEIGHT GAIN: ICD-10-CM

## 2019-11-04 DIAGNOSIS — R10.819 ABDOMINAL TENDERNESS, UNSPECIFIED SITE: ICD-10-CM

## 2019-11-04 DIAGNOSIS — R14.0 ABDOMINAL DISTENTION: ICD-10-CM

## 2019-11-04 PROCEDURE — 74176 CT ABD & PELVIS W/O CONTRAST: CPT

## 2019-11-04 RX ORDER — SODIUM CHLORIDE 0.9 % (FLUSH) 0.9 %
10 SYRINGE (ML) INJECTION
Status: DISCONTINUED | OUTPATIENT
Start: 2019-11-04 | End: 2019-11-05 | Stop reason: HOSPADM

## 2019-11-26 ENCOUNTER — HOSPITAL ENCOUNTER (OUTPATIENT)
Dept: MAMMOGRAPHY | Age: 79
Discharge: HOME | End: 2019-11-26
Attending: INTERNAL MEDICINE
Payer: MEDICARE

## 2019-11-26 DIAGNOSIS — Z12.31 VISIT FOR SCREENING MAMMOGRAM: ICD-10-CM

## 2019-11-26 PROCEDURE — 77063 BREAST TOMOSYNTHESIS BI: CPT

## 2020-03-05 ENCOUNTER — HOSPITAL ENCOUNTER (OUTPATIENT)
Dept: MRI IMAGING | Age: 80
Discharge: HOME | End: 2020-03-05
Attending: ORTHOPAEDIC SURGERY
Payer: MEDICARE

## 2020-03-05 DIAGNOSIS — G89.29 CHRONIC BILATERAL LOW BACK PAIN WITHOUT SCIATICA: ICD-10-CM

## 2020-03-05 DIAGNOSIS — M54.50 CHRONIC BILATERAL LOW BACK PAIN WITHOUT SCIATICA: ICD-10-CM

## 2020-03-05 DIAGNOSIS — S32.000S COMPRESSION FRACTURE OF LUMBAR VERTEBRA, SEQUELA: ICD-10-CM

## 2020-03-05 PROCEDURE — 72148 MRI LUMBAR SPINE W/O DYE: CPT

## 2020-05-05 PROBLEM — R20.0 NUMBNESS: Status: ACTIVE | Noted: 2020-05-05

## 2021-02-16 ENCOUNTER — HOSPITAL ENCOUNTER (INPATIENT)
Age: 81
LOS: 1 days | Discharge: HOME | DRG: 552 | End: 2021-02-16
Attending: EMERGENCY MEDICINE | Admitting: INTERNAL MEDICINE
Payer: MEDICARE

## 2021-02-16 ENCOUNTER — APPOINTMENT (OUTPATIENT)
Dept: CT IMAGING | Age: 81
DRG: 552 | End: 2021-02-16
Attending: EMERGENCY MEDICINE
Payer: MEDICARE

## 2021-02-16 ENCOUNTER — APPOINTMENT (OUTPATIENT)
Dept: NON INVASIVE DIAGNOSTICS | Age: 81
DRG: 552 | End: 2021-02-16
Attending: STUDENT IN AN ORGANIZED HEALTH CARE EDUCATION/TRAINING PROGRAM
Payer: MEDICARE

## 2021-02-16 ENCOUNTER — APPOINTMENT (OUTPATIENT)
Dept: MRI IMAGING | Age: 81
DRG: 552 | End: 2021-02-16
Attending: STUDENT IN AN ORGANIZED HEALTH CARE EDUCATION/TRAINING PROGRAM
Payer: MEDICARE

## 2021-02-16 ENCOUNTER — APPOINTMENT (OUTPATIENT)
Dept: VASCULAR SURGERY | Age: 81
DRG: 552 | End: 2021-02-16
Attending: STUDENT IN AN ORGANIZED HEALTH CARE EDUCATION/TRAINING PROGRAM
Payer: MEDICARE

## 2021-02-16 VITALS
DIASTOLIC BLOOD PRESSURE: 63 MMHG | HEART RATE: 86 BPM | TEMPERATURE: 99 F | RESPIRATION RATE: 15 BRPM | SYSTOLIC BLOOD PRESSURE: 92 MMHG | OXYGEN SATURATION: 95 %

## 2021-02-16 DIAGNOSIS — R20.0 LEFT SIDED NUMBNESS: Primary | ICD-10-CM

## 2021-02-16 DIAGNOSIS — I63.9 CEREBROVASCULAR ACCIDENT (CVA), UNSPECIFIED MECHANISM (HCC): ICD-10-CM

## 2021-02-16 PROBLEM — G45.9 TIA (TRANSIENT ISCHEMIC ATTACK): Status: ACTIVE | Noted: 2021-02-16

## 2021-02-16 LAB
ALBUMIN SERPL-MCNC: 4 G/DL (ref 3.5–5)
ALBUMIN/GLOB SERPL: 1.1 {RATIO} (ref 1.1–2.2)
ALP SERPL-CCNC: 69 U/L (ref 45–117)
ALT SERPL-CCNC: 18 U/L (ref 12–78)
ANION GAP SERPL CALC-SCNC: 8 MMOL/L (ref 5–15)
APTT PPP: 28.5 SEC (ref 22.1–31)
AST SERPL-CCNC: 20 U/L (ref 15–37)
ATRIAL RATE: 74 BPM
BASOPHILS # BLD: 0 K/UL (ref 0–0.1)
BASOPHILS NFR BLD: 1 % (ref 0–1)
BILIRUB SERPL-MCNC: 0.4 MG/DL (ref 0.2–1)
BUN SERPL-MCNC: 11 MG/DL (ref 6–20)
BUN/CREAT SERPL: 16 (ref 12–20)
CALCIUM SERPL-MCNC: 9.3 MG/DL (ref 8.5–10.1)
CALCULATED P AXIS, ECG09: 41 DEGREES
CALCULATED R AXIS, ECG10: 60 DEGREES
CALCULATED T AXIS, ECG11: 17 DEGREES
CHLORIDE SERPL-SCNC: 100 MMOL/L (ref 97–108)
CHOLEST SERPL-MCNC: 204 MG/DL
CO2 SERPL-SCNC: 24 MMOL/L (ref 21–32)
COMMENT, HOLDF: NORMAL
CREAT SERPL-MCNC: 0.67 MG/DL (ref 0.55–1.02)
DIAGNOSIS, 93000: NORMAL
DIFFERENTIAL METHOD BLD: ABNORMAL
ECHO AO ROOT DIAM: 3.59 CM
ECHO AV PEAK GRADIENT: 4.6 MMHG
ECHO AV PEAK VELOCITY: 107.24 CM/S
ECHO EST RA PRESSURE: 3 MMHG
ECHO LV INTERNAL DIMENSION DIASTOLIC: 4.12 CM (ref 3.9–5.3)
ECHO LV INTERNAL DIMENSION SYSTOLIC: 2.54 CM
ECHO LV IVSD: 0.94 CM (ref 0.6–0.9)
ECHO LV MASS 2D: 126.7 G (ref 67–162)
ECHO LV POSTERIOR WALL DIASTOLIC: 0.99 CM (ref 0.6–0.9)
ECHO LVOT PEAK GRADIENT: 4.12 MMHG
ECHO LVOT PEAK VELOCITY: 101.48 CM/S
ECHO MV A VELOCITY: 101.05 CM/S
ECHO MV AREA PHT: 4.15 CM2
ECHO MV E DECELERATION TIME (DT): 183.01 MS
ECHO MV E VELOCITY: 85.56 CM/S
ECHO MV E/A RATIO: 0.85
ECHO MV PRESSURE HALF TIME (PHT): 53.07 MS
ECHO PV PEAK INSTANTANEOUS GRADIENT SYSTOLIC: 2.4 MMHG
ECHO RIGHT VENTRICULAR SYSTOLIC PRESSURE (RVSP): 27.39 MMHG
ECHO RV TAPSE: 1.74 CM (ref 1.5–2)
ECHO TV REGURGITANT MAX VELOCITY: 246.91 CM/S
ECHO TV REGURGITANT PEAK GRADIENT: 24.39 MMHG
EOSINOPHIL # BLD: 0.4 K/UL (ref 0–0.4)
EOSINOPHIL NFR BLD: 11 % (ref 0–7)
ERYTHROCYTE [DISTWIDTH] IN BLOOD BY AUTOMATED COUNT: 13.2 % (ref 11.5–14.5)
EST. AVERAGE GLUCOSE BLD GHB EST-MCNC: 117 MG/DL
FOLATE SERPL-MCNC: 53.1 NG/ML (ref 5–21)
GLOBULIN SER CALC-MCNC: 3.6 G/DL (ref 2–4)
GLUCOSE BLD STRIP.AUTO-MCNC: 110 MG/DL (ref 65–100)
GLUCOSE SERPL-MCNC: 100 MG/DL (ref 65–100)
HBA1C MFR BLD: 5.7 % (ref 4–5.6)
HCT VFR BLD AUTO: 36.8 % (ref 35–47)
HDLC SERPL-MCNC: 52 MG/DL
HDLC SERPL: 3.9 {RATIO} (ref 0–5)
HGB BLD-MCNC: 12.2 G/DL (ref 11.5–16)
IMM GRANULOCYTES # BLD AUTO: 0 K/UL (ref 0–0.04)
IMM GRANULOCYTES NFR BLD AUTO: 0 % (ref 0–0.5)
INR PPP: 1.1 (ref 0.9–1.1)
LDLC SERPL CALC-MCNC: 136.4 MG/DL (ref 0–100)
LIPID PROFILE,FLP: ABNORMAL
LYMPHOCYTES # BLD: 0.7 K/UL (ref 0.8–3.5)
LYMPHOCYTES NFR BLD: 18 % (ref 12–49)
MAGNESIUM SERPL-MCNC: 2.1 MG/DL (ref 1.6–2.4)
MCH RBC QN AUTO: 28.2 PG (ref 26–34)
MCHC RBC AUTO-ENTMCNC: 33.2 G/DL (ref 30–36.5)
MCV RBC AUTO: 85 FL (ref 80–99)
MONOCYTES # BLD: 0.4 K/UL (ref 0–1)
MONOCYTES NFR BLD: 11 % (ref 5–13)
NEUTS SEG # BLD: 2.5 K/UL (ref 1.8–8)
NEUTS SEG NFR BLD: 59 % (ref 32–75)
NRBC # BLD: 0 K/UL (ref 0–0.01)
NRBC BLD-RTO: 0 PER 100 WBC
P-R INTERVAL, ECG05: 156 MS
PHOSPHATE SERPL-MCNC: 3.5 MG/DL (ref 2.6–4.7)
PLATELET # BLD AUTO: 209 K/UL (ref 150–400)
PMV BLD AUTO: 9.8 FL (ref 8.9–12.9)
POTASSIUM SERPL-SCNC: 3.9 MMOL/L (ref 3.5–5.1)
PROT SERPL-MCNC: 7.6 G/DL (ref 6.4–8.2)
PROTHROMBIN TIME: 11.1 SEC (ref 9–11.1)
Q-T INTERVAL, ECG07: 430 MS
QRS DURATION, ECG06: 124 MS
QTC CALCULATION (BEZET), ECG08: 477 MS
RBC # BLD AUTO: 4.33 M/UL (ref 3.8–5.2)
RBC MORPH BLD: ABNORMAL
SAMPLES BEING HELD,HOLD: NORMAL
SERVICE CMNT-IMP: ABNORMAL
SODIUM SERPL-SCNC: 132 MMOL/L (ref 136–145)
THERAPEUTIC RANGE,PTTT: NORMAL SECS (ref 58–77)
TRIGL SERPL-MCNC: 78 MG/DL (ref ?–150)
TROPONIN I SERPL-MCNC: <0.05 NG/ML
TROPONIN I SERPL-MCNC: <0.05 NG/ML
TSH SERPL DL<=0.05 MIU/L-ACNC: 4.34 UIU/ML (ref 0.36–3.74)
VENTRICULAR RATE, ECG03: 74 BPM
VIT B12 SERPL-MCNC: 668 PG/ML (ref 193–986)
VLDLC SERPL CALC-MCNC: 15.6 MG/DL
WBC # BLD AUTO: 4 K/UL (ref 3.6–11)

## 2021-02-16 PROCEDURE — 83735 ASSAY OF MAGNESIUM: CPT

## 2021-02-16 PROCEDURE — 65270000029 HC RM PRIVATE

## 2021-02-16 PROCEDURE — 70544 MR ANGIOGRAPHY HEAD W/O DYE: CPT

## 2021-02-16 PROCEDURE — 70551 MRI BRAIN STEM W/O DYE: CPT

## 2021-02-16 PROCEDURE — 83036 HEMOGLOBIN GLYCOSYLATED A1C: CPT

## 2021-02-16 PROCEDURE — 93306 TTE W/DOPPLER COMPLETE: CPT

## 2021-02-16 PROCEDURE — 99285 EMERGENCY DEPT VISIT HI MDM: CPT

## 2021-02-16 PROCEDURE — 82962 GLUCOSE BLOOD TEST: CPT

## 2021-02-16 PROCEDURE — 82607 VITAMIN B-12: CPT

## 2021-02-16 PROCEDURE — 85025 COMPLETE CBC W/AUTO DIFF WBC: CPT

## 2021-02-16 PROCEDURE — 82746 ASSAY OF FOLIC ACID SERUM: CPT

## 2021-02-16 PROCEDURE — 97535 SELF CARE MNGMENT TRAINING: CPT

## 2021-02-16 PROCEDURE — 93306 TTE W/DOPPLER COMPLETE: CPT | Performed by: INTERNAL MEDICINE

## 2021-02-16 PROCEDURE — 85730 THROMBOPLASTIN TIME PARTIAL: CPT

## 2021-02-16 PROCEDURE — 80061 LIPID PANEL: CPT

## 2021-02-16 PROCEDURE — 97165 OT EVAL LOW COMPLEX 30 MIN: CPT

## 2021-02-16 PROCEDURE — APPNB30 APP NON BILLABLE TIME 0-30 MINS: Performed by: NURSE PRACTITIONER

## 2021-02-16 PROCEDURE — 80053 COMPREHEN METABOLIC PANEL: CPT

## 2021-02-16 PROCEDURE — 84443 ASSAY THYROID STIM HORMONE: CPT

## 2021-02-16 PROCEDURE — 84100 ASSAY OF PHOSPHORUS: CPT

## 2021-02-16 PROCEDURE — 70450 CT HEAD/BRAIN W/O DYE: CPT

## 2021-02-16 PROCEDURE — 93005 ELECTROCARDIOGRAM TRACING: CPT

## 2021-02-16 PROCEDURE — 99205 OFFICE O/P NEW HI 60 MIN: CPT | Performed by: PSYCHIATRY & NEUROLOGY

## 2021-02-16 PROCEDURE — 84484 ASSAY OF TROPONIN QUANT: CPT

## 2021-02-16 PROCEDURE — 93880 EXTRACRANIAL BILAT STUDY: CPT

## 2021-02-16 PROCEDURE — 85610 PROTHROMBIN TIME: CPT

## 2021-02-16 PROCEDURE — 36415 COLL VENOUS BLD VENIPUNCTURE: CPT

## 2021-02-16 RX ORDER — ACETAMINOPHEN 650 MG/1
650 SUPPOSITORY RECTAL
Status: DISCONTINUED | OUTPATIENT
Start: 2021-02-16 | End: 2021-02-16 | Stop reason: HOSPADM

## 2021-02-16 RX ORDER — POLYETHYLENE GLYCOL 3350 17 G/17G
17 POWDER, FOR SOLUTION ORAL DAILY
Status: DISCONTINUED | OUTPATIENT
Start: 2021-02-16 | End: 2021-02-16 | Stop reason: HOSPADM

## 2021-02-16 RX ORDER — ACETAMINOPHEN 325 MG/1
650 TABLET ORAL
Status: DISCONTINUED | OUTPATIENT
Start: 2021-02-16 | End: 2021-02-16 | Stop reason: HOSPADM

## 2021-02-16 RX ORDER — SODIUM CHLORIDE 9 MG/ML
75 INJECTION, SOLUTION INTRAVENOUS CONTINUOUS
Status: DISCONTINUED | OUTPATIENT
Start: 2021-02-16 | End: 2021-02-16 | Stop reason: HOSPADM

## 2021-02-16 RX ORDER — ASPIRIN 81 MG/1
81 TABLET ORAL DAILY
Qty: 30 TAB | Refills: 11 | Status: SHIPPED | COMMUNITY
Start: 2021-02-16 | End: 2022-03-14

## 2021-02-16 NOTE — PROGRESS NOTES
MRI unremarkable. Appreciate Dr. Berkley Winters input. Am discharging pt home on ASA 81 mg every day after she had her carotid dopplers done. She is to f/u with me within a week, and we can review carotid doppler & ECHO results.

## 2021-02-16 NOTE — ED TRIAGE NOTES
Two hours ago she felt sudden onset of numbness in her left leg. Coming here she also was seeing halos around the lights. This is not something she has experienced before.

## 2021-02-16 NOTE — H&P
History & Physical    Primary Care Provider: Mitch Cruz MD  Source of Information: Patient and chart review    History of Presenting Illness:   Sherilyn Ormond is a [de-identified] y.o. female with pmh of GERD, chronic pain, constipation who presents to hospital with CC of numbness in her left leg. Patient states prior to ER presentation, she noticed an abrupt onset of numbness in her left lower extremity. She states that her sensation is difficult to characterize but she feels that she has slightly diminished sensation in her left lower extremity. Even while in emergency room, patient states she continues to experience the symptoms. Additionally, she states that in route to the emergency room, patient also complained of changes in her vision describing seen halos and rainbows surrounding street lights. She denies any overt vision loss, facial numbness or tingling. Daughter was at bedside denies any facial droop or changes in her speech. Patient denies any overt lower extremity weakness or gait imbalances. The patient denies any fever, chills, chest pain, cough, congestion, recent illness, palpitations, or dysuria. She has had no recent travel or sick contacts. On ER presentation, vital signs were remarkable for BP of 149/80. Patient was evaluated by teleneurology who recommended stroke work-up. Review of Systems:  A comprehensive review of systems was negative except for that written in the History of Present Illness.      Past Medical History:   Diagnosis Date    Diverticulitis     Dystrophy, cornea     Environmental allergies     Family history of skin cancer     GERD (gastroesophageal reflux disease)     Hypotension     Ill-defined condition     Hyponatremia    Multiple drug allergies     Osteoporosis     Shingles 05/2019    Sun-damaged skin     Vertebral compression fracture ([address] 01/05/12    Vocal cord anomaly     vocal cord dysfunction per speech therapist      Past Surgical History:   Procedure Laterality Date    COLONOSCOPY N/A 7/7/2017    COLONOS[facility] performed [facility]issa [facility] at [facility] ENDOSCOPY    HC BLD CARPEL 2018 [facility] HX CATARACT REMOVAL  07/2012    HX GYN      D&C    HX MOHS PROCEDURES  04/11/2017    [facility] left cheek by Dr. Forrestine Seip     Prior to Admission medications    Medication Sig Start Date End Date Taking? Authorizing Provider   traMADoL (ULTRAM) 50 mg tablet TAKE 1 TO 2 TABLETS BY MOUTH THREE TIMES DAILY AS NEEDED FOR PAIN. MAX DAILY AMOUNT 300MG (5 TABS) 1/27/21 7/26/21  Xiang García MD   acetaminophen (TYLENOL) 325 mg tablet Take 325 mg by mouth every eight (8) hours. Provider, Historical   therapeutic multivitamin (THERAGRAN) tablet Take 1 Tab by mouth daily. Provider, Historical   triamcinolone acetonide (KENALOG) 0.1 % topical cream APPLY TO AFFECTED AREA TWO (2) TIMES DAILY AS NEEDED FOR SKIN IRRITATION. USE THIN LAYER 11/29/18   Xiang García MD   CALCIUM CITRATE (CITRACAL PO) Take 1 Tab by mouth two (2) times a day. Provider, Historical   polyethylene glycol (MIRALAX) 17 gram packet Take 17 g by mouth daily as needed. Provider, Historical   docusate sodium (COLACE) 100 mg capsule Take 100 mg by mouth two (2) times daily as needed. Provider, Historical   cholecalciferol, vitamin D3, 2,000 unit tab Take 1 Tab by mouth daily. Provider, Historical   cyanocobalamin (VITAMIN B12) 500 mcg tablet Take 500 mcg by mouth daily. Provider, Historical   Dexlansoprazole (DEXILANT) 60 mg CpDB Take 1 Cap by mouth daily. Patient taking differently: Take 60 mg by mouth every fourty-eight (48) hours. 2/29/16   Rosalva Encinas MD   loteprednol etabonate (LOTEMAX) 0.5 % ophthalmic suspension Administer 1 Drop to both eyes daily.     Provider, Historical     Allergies   Allergen Reactions    Clindamycin Anaphylaxis    Ketek [Telithromycin] Anaphylaxis    Levaquin [Levofloxacin] Anaphylaxis    Nexium [Esomeprazole Magnesium] Anaphylaxis    Sudafed [Pseudoephedrine Hcl] Anaphylaxis    Voltaren [Diclofenac Sodium] Other (comments)     Mild throat closing and severe nausea    Xyzal [Levocetirizine] Anaphylaxis    Ceftin [Cefuroxime Axetil] Unknown (comments)    Cortisone Rash     Can take depro medrol if preservative free only    Methylprednisolone Rash     Facial rash    Mobic [Meloxicam] Other (comments)     Throat closing, severe nausea, abd pain and facial swelling    Vibramycin [Doxycycline Calcium] Rash     Facial rash    Afrin [Pseudoephedrine Sulfate] Other (comments)     Facial numbness that lasted 45 minutes during testing    Antihistamine-1 Anaphylaxis    Azithromycin Other (comments)     \"neck stiffness & face numbness & swelling\"    Bactrim [Sulfamethoprim Ds] Rash     Facial rash and swelling    Biaxin [Clarithromycin] Rash     Severe facial rash and swelling    Ciprofloxacin Other (comments)     \"numbness and tingling in foot & leg\"    Codeine Angioedema    Doxycycline Anaphylaxis    Flagyl [Metronidazole] Other (comments)     Reports on the 7th day of taking it \"I had SEVERE Cranial pain that was not a headache\"     Gabapentin Other (comments)    Pcn [Penicillins] Anaphylaxis    Restasis [Cyclosporine] Angioedema      Family History   Problem Relation Age of Onset    Heart Disease Mother     Cancer Father 61        lung    Cataract Daughter         congenital cataracts    Heart Disease Daughter         fast heart rythmn        SOCIAL HISTORY:  Patient resides:  Independently x   Assisted Living    SNF    With family care       Smoking history:   None x   Former    Chronic      Alcohol history:   None x   Social    Chronic      Ambulates:   Independently x   w/cane    w/walker    w/wc    CODE STATUS:  DNR    Full x   Other      Objective:     Physical Exam:     Visit Vitals  BP (!) 149/80 (BP 1 Location: Left upper arm, BP Patient Position: Sitting)   Pulse 88   Temp 96.8 °F (36 °C)   Resp 16   SpO2 96%      O2 Device: Room air    General:  Alert, cooperative, no distress, appears stated age. Head:  Normocephalic, without obvious abnormality, atraumatic. Eyes:  Conjunctivae/corneas clear. PERRL, EOMs intact. Nose: Nares normal. Septum midline. Mucosa normal. No drainage or sinus tenderness. Throat: Lips, mucosa, and tongue normal. Teeth and gums normal.   Neck: Supple, symmetrical, trachea midline, no adenopathy, thyroid: no enlargement/tenderness/nodules, no carotid bruit and no JVD. Back:   Symmetric, no curvature. ROM normal. No CVA tenderness. Lungs:   Clear to auscultation bilaterally. Chest wall:  No tenderness or deformity. Heart:  Regular rate and rhythm, S1, S2 normal, no murmur, click, rub or gallop. Abdomen:   Soft, non-tender. Bowel sounds normal. No masses,  No organomegaly. Extremities: Extremities normal, atraumatic, no cyanosis or edema. Pulses: 2+ and symmetric all extremities. Skin: Skin color, texture, turgor normal. No rashes or lesions   Neurologic: CNII-XII intact. 5/5 strength in bilateral upper and lower extremities. No sensory deficits in bilateral lower extremities. No dysmetria. No dysdiadochokinesia. Normal gait. Normal Romberg's. Normal heel-to-toe testing. EKG:  normal EKG, normal sinus rhythm. Data Review:     Recent Days:  No results for input(s): WBC, HGB, HCT, PLT, HGBEXT, HCTEXT, PLTEXT in the last 72 hours. No results for input(s): NA, K, CL, CO2, GLU, BUN, CREA, CA, MG, PHOS, ALB, TBIL, ALT, INR, INREXT in the last 72 hours. No lab exists for component: SGOT  No results for input(s): PH, PCO2, PO2, HCO3, FIO2 in the last 72 hours.     24 Hour Results:  Recent Results (from the past 24 hour(s))   GLUCOSE, POC    Collection Time: 02/16/21 12:24 AM   Result Value Ref Range    Glucose (POC) 110 (H) 65 - 100 mg/dL    Performed by Yair Maldonado    EKG, 12 LEAD, INITIAL    Collection Time: 02/16/21 12:54 AM   Result Value Ref Range    Ventricular Rate 74 BPM    Atrial Rate 74 BPM    P-R Interval 156 ms    QRS Duration 124 ms    Q-T Interval 430 ms    QTC Calculation (Bezet) 477 ms    Calculated P Axis 41 degrees    Calculated R Axis 60 degrees    Calculated T Axis 17 degrees    Diagnosis       Normal sinus rhythm  Nonspecific intraventricular conduction delay  When compared with ECG of 29-FEB-2016 12:11,  No significant change was found     SAMPLES BEING HELD    Collection Time: 02/16/21  1:04 AM   Result Value Ref Range    SAMPLES BEING HELD 1RED     COMMENT        Add-on orders for these samples will be processed based on acceptable specimen integrity and analyte stability, which may vary by analyte. Imaging:     Assessment:     Joe Ramirez is a [de-identified] y.o. female with pmh of GERD, chronic pain, constipation who is admitted for left lower extremity numbness with concerns for CVA/TIA       Plan:       Left lower extremity numbness / CVA r/o  -Patient with known history of lumbar spinal degenerative disc disease  -Current presentation may be secondary to worsening lumbar spinal disease vs overt cva  -CVA work-up per neuro recommendations  -Patient with history of severe contrast allergy  -CT head unremarkable  -Obtain MRI/MRA brain, carotid Dopplers, echo  -TSH, B12, A1c, magnesium, phosphorus  -Initial troponin negative. Continue with serial troponins  -Admit to and monitor on telemetry  -PT OT on consult.  -Neurology on consult, appreciate recs    Lumbar degenerative disc disease  -Multilevel degenerative disc disease noted on MRI from 3/5/20  -In event of unrevealing CVA work-up, can have repeat lumbar MRI and Ortho follow-up outpatient.     GERD  -Continue home PPI    Chronic pain  -Continue home tramadol              FEN/GI -  PO Hydration  Activity - As tolerated  DVT prophylaxis - SCDs  GI prophylaxis -  NI  Disposition - Home    CODE STATUS:  Full code       Signed By: Long Edwards MD February 16, 2021

## 2021-02-16 NOTE — PROGRESS NOTES
Care Management:    Transition of Care Plan:     · RUR: 11%  · Disposition: home with office follow up   · Transportation: daughter    Met with patient and daughter - Sonu Azevedo in the room. Patient confirmed her address. CM updated her phone number and emergency contacts for her daughters. Patient lives alone in her 1 level apartment with 3 steps (in front) /  8 steps (in back) to enter. Patient is independent with her ADLs. She owns a cane, but only used it since this current event began. Daughter-Lazara will transport her home. She had home health in 2012 and has never been to any inpatient rehab or SNF. Reason for Admission:   Rule out TIA / CVA                   RUR Score:         11%            Plan for utilizing home health:   TBD, No needs are likely. PCP: First and Last name:  Dr. Cesia Bailey   Name of Practice: [facility]   Are you a current patient: Yes/No: yes   Approximate date of last visit: 8-28-20   Can you participate in a virtual visit with your PCP: yes                    Current Advanced Directive/Advance Care Plan: Yes. Patient has an AMD. Encouraged her to bring it to Dr. Milton Villatoro office the next time     [address]:           Primary Decision Maker: Mercedluanne Peraltaant - Daughter - [phone] (local)    Secondary Decision Maker: Julia Hope - Daughter - [phone] (lives in [place])    Care Management Interventions  PCP Verified by CM: Yes(Dr. Cesia Bailey)  Last Visit to PCP: 08/28/20  Mode of Transport at Discharge:  Other (see comment)  Transition of Care Consult (CM Consult): Discharge Planning  Discharge Durable Medical Equipment: No  Physical Therapy Consult: Yes  Occupational Therapy Consult: Yes  Speech Therapy Consult: Yes  Current Support Network: Lives Alone  Confirm Follow Up Transport: Family  [place] Resource Information Provided?: No  Discharge Location  Discharge Placement: Home

## 2021-02-16 NOTE — PROGRESS NOTES
Neurocritical Care Code Stroke Documentation    Symptoms:   Left leg numbness and \"halos\" around lights in vision. Has a hx of low back vertebrae fractures and stenosis   Last Known Well:     Medical hx: Active Problems:    TIA (transient ischemic attack) (2021)      CVA (cerebral vascular accident) ([facility] [address] (2021)       Anticoagulation:  None   VAN:   Negative   NIHSS:   1a-LOC:0    1b-Month/Age:0    1c-Open/Close Hand:0    2-Best Gaze:0    3-Visual Fields:0    4-Facial Palsy:0    5a-Left Arm:0    5b-Right Arm:0    6a-Left Le    6b-Right Le    7-Limb Ataxia:0    8-Sensory:1    9-Best Language:0    10-Dysarthria:0    11-Extinction/Inattention:0  TOTAL SCORE:1   Imaging:   CT head showed no acute process. Plan:   TPA Candidate: NO    Mechanical thrombectomy Candidate: NO     Discussed with: Dr. Grant Devine and Dr. Selene Prince    Time spent: 30 minutes.      Angeles Carnes, NP  Neurocritical Care Nurse Practitioner  [phone]

## 2021-02-16 NOTE — ROUTINE PROCESS
OCCUPATIONAL THERAPY EVALUATION/DISCHARGE Patient: Joana Merino (89 y.o. female) Date: 2/16/2021 Primary Diagnosis: TIA (transient ischemic attack) [G45.9] CVA (cerebral vascular accident) ([facility] [I63.9] Precautions: falls ASSESSMENT Based on the objective data described below, the patient presents with baseline ADL performance s/p admission for TIA/CVA. Noted imaging negative for acute intercranial process. Patient with baseline back pain 2* hx of vertebral fx. Patient BUE ROM, strength, coordination and balance WFL. Patient reporting vision WFL. Patient reports impaired sensation in LLE (was from hip to toes, now from knee to toes) however, patient continues to be functional. Patient completed bed mobility and functional mobility IND. Patient completed lower body dressing and toileting IND. Patient left in bed with call bell in reach, all needs met, daughter bedside, all questions answered. Patient with no further acute OT needs, will sign off. Current Level of Function (ADLs/self-care): IND Functional Outcome Measure: The patient scored Total A-D Total A-D (Motor Function): 66/66 on the Fugl-Jennings Assessment which is indicative of no impairment in upper extremity functional status. Other factors to consider for discharge: none PLAN : 
Recommendation for discharge: (in order for the patient to meet his/her long term goals) No skilled occupational therapy/ follow up rehabilitation needs identified at this time. This discharge recommendation: 
Has not yet been discussed the attending provider and/or case management IF patient discharges home will need the following DME: patient owns DME required for discharge SUBJECTIVE:  
Patient stated Its getting better.  re: numbness in LLE OBJECTIVE DATA SUMMARY:  
HISTORY:  
Past Medical History:  
Diagnosis Date  Diverticulitis  Dystrophy, cornea  Environmental allergies  Family history of skin cancer  GERD (gastroesophageal reflux disease)  Hypotension  Ill-defined condition Hyponatremia  Multiple drug allergies  Osteoporosis  Shingles 05/2019  Sun-damaged skin  Vertebral compression fracture ([address] 01/05/12  Vocal cord anomaly   
 vocal cord dysfunction per speech therapist  
 
Past Surgical History:  
Procedure Laterality Date  COLONOSCOPY N/A 7/7/2017 COLONOSCOPY performed by Maik Zhong MD at [facility] ENDOSCOPY  
 HC  [address]  HX CATARACT REMOVAL  07/2012  HX GYN    
 D&C  
 HX MOHS PROCEDURES  04/11/2017 BCC left cheek by Dr. Genia To Prior Level of Function/Environment/Context: lives in 1 level apartment alone, has good support from daughter. IND at baseline. Expanded or extensive additional review of patient history:  
 
Home Situation Home Environment: Apartment # Steps to Enter: 3 Rails to Enter: Yes Hand Rails : Left Living Alone: Yes Support Systems: Family member(s) Current DME Used/Available at Home: Cane, straight, Walker, rolling, Grab bars Tub or Shower Type: Tub/Shower combination Hand dominance: Right EXAMINATION OF PERFORMANCE DEFICITS: 
Cognitive/Behavioral Status: 
Neurologic State: Alert Orientation Level: Oriented X4 Cognition: Appropriate for age attention/concentration; Follows commands Perception: Appears intact Perseveration: No perseveration noted Safety/Judgement: Awareness of environment; Fall prevention Skin: appears intact Edema:  None noted in BUEs Hearing: 
  
 
Vision/Perceptual:   
Tracking: Able to track stimulus in all quadrants w/o difficulty Diplopia: No   
Acuity: Within Defined Limits Corrective Lenses: Glasses Range of Motion: In [address] AROM: Within functional limits PROM: Within functional limits Strength: In [address] Strength: Within functional limits Coordination: 
Coordination: Within functional limits Fine Motor Skills-Upper: Left Intact; Right Intact Gross Motor Skills-Upper: Left Intact; Right Intact Tone & Sensation: In [address] Tone: Normal 
Sensation: Intact Balance: 
Sitting: Intact Standing: Intact Functional Mobility and Transfers for ADLs: 
Bed Mobility: 
Rolling: Independent Supine to Sit: Independent Sit to Supine: Independent Scooting: Independent Transfers: 
Sit to Stand: Independent Stand to Sit: Independent Toilet Transfer : Independent ADL Assessment: 
Feeding: Independent Oral Facial Hygiene/Grooming: Independent Bathing: Independent Upper Body Dressing: Independent Lower Body Dressing: Independent Toileting: Independent ADL Intervention and task modifications: 
  
Grooming Position Performed: Standing Washing Hands: Independent Lower Body Dressing Assistance Shoes with Cloth Laces: Independent Leg Crossed Method Used: Yes Position Performed: Seated edge of bed Cues: Eunice Mutsandhya Toileting Toileting Assistance: Independent Bladder Hygiene: Independent Bowel Hygiene: Independent Clothing Management: Independent Cognitive Retraining Safety/Judgement: Awareness of environment; Fall prevention Functional Measure: 
Fugl-Jennings Assessment of Motor Recovery after Stroke:  
Reflex Activity Flexors/Biceps/Fingers: Can be elicited Extensors/Triceps: Can be elicited Reflex Subtotal: 4 Volitional Movement Within Synergies Shoulder Retraction: Full Shoulder Elevation: Full Shoulder Abduction (90 degrees): Full Shoulder External Rotation: Full Elbow Flexion: Full Forearm Supination: Full Shoulder Adduction/Internal Rotation: Full Elbow Extension: Full Forearm Pronation: Full Subtotal: 18 
 
Volitional Movement Mixing Synergies Hand to Lumbar Spine: Full Shoulder Flexion (0-90 degrees): Full Pronation-Supination: Full Subtotal: 6 Volitional Movement With Little or No Synergy Shoulder Abduction (0-90 degrees): Full Shoulder Flexion ( degrees): Full Pronation/Supination: Full Subtotal : 6 Normal Reflex Activity Biceps, Triceps, Finger Flexors: Full Subtotal : 2 Upper Extremity Total  
Upper Extremity Total: 36 Wrist 
Stability at 15 Degree Dorsiflexion: Full Repeated Dorsiflexion/ Volar Flexion: Full Stability at 15 Degree Dorsiflexion: Full Repeated Dorsiflexion/ Volar Flexion: Full Circumduction: Full Wrist Total: 10 Hand Mass Flexion: Full Mass Extension: Full Grasp A: Full Grasp B: Full Grasp C: Full Grasp D: Full Grasp E: Full Hand Total: 14 
 
Coordination/Speed Tremor: None Dysmetria: None Time: <1s Coordination/Speed Total : 6 Total A-D Total A-D (Motor Function): 97/70 This is a reliable/valid measure of arm function after a neurological event. It has established value to characterize functional status and for measuring spontaneous and therapy-induced recovery; tests proximal and distal motor functions. Fugl-Jennings Assessment  UE scores recorded between five and 30 days post neurologic event can be used to predict UE recovery at six months post neurologic event. Severe = 0-21 points Moderately Severe = 22-33 points Moderate = 34-47 points Mild = 48-66 points CHARISSA Boyer, DULCE Anna, & RAVI Gillette (1992). Measurement of motor recovery after stroke: Outcome assessment and sample size requirements. Stroke, 23, pp. 8586-5196.  
------------------------------------------------------------------------------------------------------------------------------------------------------------------ MCID: 
Stroke: Billy Berry, 2001; n = 171; mean age 79 (6) years; assessed within 16 (12) days of stroke, Acute Stroke) FMA Motor Scores from Admission to Discharge 10 point increase in FMA Upper Extremity = 1.5 change in discharge FIM  
10 point increase in FMA Lower Extremity = 1.9 change in discharge FIM 
MDC:  
Stroke: Terrance Brand et al, 2008, n = 14, mean age = 59.9 (14.6) years, assessed on average 14 (6.5) months post stroke, Chronic Stroke) FMA = 5.2 points for the Upper Extremity portion of the assessment Occupational Therapy Evaluation Charge Determination History Examination Decision-Making LOW Complexity : Brief history review  LOW Complexity : 1-3 performance deficits relating to physical, cognitive , or psychosocial skils that result in activity limitations and / or participation restrictions  LOW Complexity : No comorbidities that affect functional and no verbal or physical assistance needed to complete eval tasks Based on the above components, the patient evaluation is determined to be of the following complexity level: LOW Pain Rating: 
none Activity Tolerance:  
Good After treatment patient left in no apparent distress:   
Supine in bed, Call bell within reach, Caregiver / family present, Side rails x 3 and RN aware COMMUNICATION/EDUCATION:  
The patients plan of care was discussed with: Physical therapist and Registered nurse. Patient and/or family was verbally educated on the BE FAST acronym for signs/symptoms of CVA and TIA. BE FAST was written on patient's communication board  for visual education and reinforcement. All questions answered with patient indicating good understanding. Thank you for this referral. 
Irene Cope OT Time Calculation: 29 mins

## 2021-02-16 NOTE — ROUTINE PROCESS
Orders received, chart reviewed and patient evaluated by occupational therapy. Pending progression with skilled acute occupational therapy, recommend: No skilled occupational therapy/ follow up rehabilitation needs identified at this time. Recommend with nursing ADLs with supervision/setup, OOB to chair 3x/day and toileting via functional mobility to and from bathroom with supervision. Thank you for completing as able in order to maintain patient strength, endurance and independence. Full evaluation to follow.

## 2021-02-16 NOTE — PROGRESS NOTES
Physical Therapy 2/16/2021    Orders received and chart reviewed up to date. Per OT, pt with no acute PT needs. Pt does not report concerns for stairs at home, has assistance from family. Will complete orders. Recommend with nursing patient to complete as able in order to maintain strength, endurance and independence: OOB to chair 3x/day and ambulating with supervision. Thank you.   Mariella Mcdonough, PT, DPT

## 2021-02-16 NOTE — ED NOTES
.Bedside shift change report given to Carmenza Benito (oncoming nurse) by Prince Jenkins (offgoing nurse). Report included the following information SBAR, ED Summary and Recent Results.

## 2021-02-16 NOTE — PROGRESS NOTES
Physical Therapy 2/16/2021    Orders received and chart reviewed up to date. ECHO bedside, will follow-up as appropriate. Thank you.   Felecia Parra, PT, DPT

## 2021-02-16 NOTE — ACP (ADVANCE CARE PLANNING)
Advance Care Planning     Advance Care Planning Activator (Inpatient)  Conversation Note      Date of ACP Conversation: 02/16/21     Conversation Conducted with:   Patient with Decision Making Capacity    ACP Activator: SHARATH Charles    Health Care Decision Maker:    Current Designated Health Care Decision Maker:     Primary Decision Maker: Jennifer Simth - Daughter - [phone]    Secondary Decision Maker: Keyona Mendoza - Daughter - [phone]       Length of ACP Conversation in minutes:  8    Conversation Outcomes:  [] ACP discussion completed  [x] Existing advance directive reviewed with patient; no changes to patient's previously recorded wishes     [] New Advance Directive completed   [] Portable Do Not Resuscitate prepared for Provider review and signature  [] POLST/POST/MOLST/MOST prepared for Provider review and signature      Follow-up plan:    [] Schedule follow-up conversation to continue planning  [] Referred individual to Provider for additional questions/concerns   [x] Advised patient/agent/surrogate to review completed ACP document and update if needed with changes in condition, patient preferences or care setting     [x] This note routed to one or more involved healthcare providers       Met with patient and her daughter Alan Maher at patient's bedside. Patient does have an AMD but does not have it with her. Patient anticipates being discharged today. Encouraged her AMD to her next PCP visit so it can be scanned in to her chart. Patient states that her AMD lists her daughter Alan Maher as primary mPOA and her daughter Juanjose Moura as her secondary. Miguel Ángel Jordan lives in [place].     SHARATH Charles

## 2021-02-16 NOTE — PROGRESS NOTES
Notified by bedside nurse that patient wanted to speak to MD.  I spent considerable amount of time with patient and daughter. They are both very pleasant. Patient states she is extremely concerned about being in the hospital during Covid and would prefer to be home. I explained to patient that I would do my best to expedite her CVA work-up and neurology consultation after which she may potentially be discharged. I have spoken to MRI tech who is graciously agreed to prioritize her studies. Also placed stat consult to neurology to have patient seen early morning anticipating early discharge. Explained to patient in case of negative stroke work-up, she can likely obtain lumbar spine MRI outpatient and follow-up with Ortho. Patient and daughter expressed gratitude.

## 2021-02-16 NOTE — CONSULTS
INPATIENT NEUROLOGY CONSULTATION  2/16/2021     Consulted by: Shane Lay MD        Patient ID:  Joana Merino  [id number]  [de-identified] y.o.  1940    CC: Left leg numbness and vision changes    HPI    Ms. James Sloan is an 25-year-old woman with a history of arthritis, spine disease, hypotension, new prediabetes here because last night while walking she noticed sudden intense paresthesias of the left leg from about the hip down to the foot. This has happened before similarly but not as intense or widespread. No arm or face involvement. She called her daughter. She decided to get checked out. On the way to the hospital while it was raining she noticed that when she looked at headlights she saw a halo with some colors. She never had that before. No history of migraines. Vision symptoms are short-lived and resolved and have not returned. Leg symptoms are still persisting but improved. MR I was done without stroke. MRA negative. Echo completed results are pending. She does not take any aspirin at home. Review of Systems   Eyes: Negative for double vision. Musculoskeletal: Negative for falls. Neurological: Positive for tingling and sensory change. Negative for focal weakness. All other systems reviewed and are negative.       Past Medical History:   Diagnosis Date    Diverticulitis     Dystrophy, cornea     Environmental allergies     Family history of skin cancer     GERD (gastroesophageal reflux disease)     Hypotension     Ill-defined condition     Hyponatremia    Multiple drug allergies     Osteoporosis     Shingles 05/2019    Sun-damaged skin     Vertebral compression fracture ([facility] [address] 01/05/12    Vocal cord anomaly     vocal cord dysfunction per speech therapist     Family History   Problem Relation Age of Onset    Heart Disease Mother     Cancer Father 61        lung    Cataract Daughter         congenital cataracts    Heart Disease Daughter         fast heart rythmn     Social History     Socioeconomic History    Marital status:      Spouse name: Not on file    Number of children: Not on file    Years of education: Not on file    Highest education level: Not on file   Occupational History    Not on file   Social Needs    Financial resource strain: Not on file    Food insecurity     Worry: Not on file     Inability: Not on file    Transportation needs     Medical: Not on file     Non-medical: Not on file   Tobacco Use    Smoking status: Never Smoker    Smokeless tobacco: Never Used   Substance and Sexual Activity    Alcohol use: No    Drug use: No    Sexual activity: Not on file   Lifestyle    Physical activity     Days per week: Not on file     Minutes per session: Not on file    Stress: Not on file   Relationships    Social connections     Talks on phone: Not on file     Gets together: Not on file     Attends Druze service: Not on file     Active member of club or organization: Not on file     Attends meetings of clubs or organizations: Not on file     Relationship status: Not on file    Intimate partner violence     Fear of current or ex partner: Not on file     Emotionally abused: Not on file     Physically abused: Not on file     Forced sexual activity: Not on file   Other Topics Concern    Not on file   Social History Narrative    Not on file     Current Facility-Administered Medications   Medication Dose Route Frequency    acetaminophen (TYLENOL) tablet 650 mg  650 mg Oral Q4H PRN    Or    acetaminophen (TYLENOL) solution 650 mg  650 mg Per NG tube Q4H PRN    Or    acetaminophen (TYLENOL) suppository 650 mg  650 mg Rectal Q4H PRN    0.9% sodium chloride infusion  75 mL/hr IntraVENous CONTINUOUS    polyethylene glycol (MIRALAX) packet 17 g  17 g Oral DAILY     Current Outpatient Medications   Medication Sig    traMADoL (ULTRAM) 50 mg tablet TAKE 1 TO 2 TABLETS BY MOUTH THREE TIMES DAILY AS NEEDED FOR PAIN.  MAX DAILY AMOUNT 300MG (5 TABS)    acetaminophen (TYLENOL) 325 mg tablet Take 325 mg by mouth every eight (8) hours.  therapeutic multivitamin (THERAGRAN) tablet Take 1 Tab by mouth daily.  triamcinolone acetonide (KENALOG) 0.1 % topical cream APPLY TO AFFECTED AREA TWO (2) TIMES DAILY AS NEEDED FOR SKIN IRRITATION. USE THIN LAYER    CALCIUM CITRATE (CITRACAL PO) Take 1 Tab by mouth two (2) times a day.  polyethylene glycol (MIRALAX) 17 gram packet Take 17 g by mouth daily as needed.  docusate sodium (COLACE) 100 mg capsule Take 100 mg by mouth two (2) times daily as needed.  cholecalciferol, vitamin D3, 2,000 unit tab Take 1 Tab by mouth daily.  cyanocobalamin (VITAMIN B12) 500 mcg tablet Take 500 mcg by mouth daily.  Dexlansoprazole (DEXILANT) 60 mg CpDB Take 1 Cap by mouth daily. (Patient taking differently: Take 60 mg by mouth every fourty-eight (48) hours.)    loteprednol etabonate (LOTEMAX) 0.5 % ophthalmic suspension Administer 1 Drop to both eyes daily.      Allergies   Allergen Reactions    Clindamycin Anaphylaxis    Ketek [Telithromycin] Anaphylaxis    Levaquin [Levofloxacin] Anaphylaxis    Nexium [Esomeprazole Magnesium] Anaphylaxis    Sudafed [Pseudoephedrine Hcl] Anaphylaxis    Voltaren [Diclofenac Sodium] Other (comments)     Mild throat closing and severe nausea    Xyzal [Levocetirizine] Anaphylaxis    Ceftin [Cefuroxime Axetil] Unknown (comments)    Cortisone Rash     Can take depro medrol if preservative free only    Methylprednisolone Rash     Facial rash    Mobic [Meloxicam] Other (comments)     Throat closing, severe nausea, abd pain and facial swelling    Vibramycin [Doxycycline Calcium] Rash     Facial rash    Afrin [Pseudoephedrine Sulfate] Other (comments)     Facial numbness that lasted 45 minutes during testing    Antihistamine-1 Anaphylaxis    Azithromycin Other (comments)     \"neck stiffness & face numbness & swelling\"    Bactrim [Sulfamethoprim Ds] Rash     Facial rash and swelling  Biaxin [Clarithromycin] Rash     Severe facial rash and swelling    Ciprofloxacin Other (comments)     \"numbness and tingling in foot & leg\"    Codeine Angioedema    Doxycycline Anaphylaxis    Flagyl [Metronidazole] Other (comments)     Reports on the 7th day of taking it \"I had SEVERE Cranial pain that was not a headache\"     Gabapentin Other (comments)    Pcn [Penicillins] Anaphylaxis    Restasis [Cyclosporine] Angioedema       Visit Vitals  /74   Pulse 72   Temp 99 °F (37.2 °C)   Resp 19   SpO2 98%     Physical Exam   Constitutional: She is oriented to person, place, and time. She appears well-developed and well-nourished. Cardiovascular: Normal rate. Pulmonary/Chest: Effort normal.   Neurological: She is oriented to person, place, and time. She has normal strength. Gait normal.   Vitals reviewed. Neurologic Exam     Mental Status   Oriented to person, place, and time. Cranial Nerves   Cranial nerves II through XII intact. Motor Exam   Muscle bulk: normal    Strength   Strength 5/5 throughout.      Sensory Exam   Light touch normal.     Gait, Coordination, and Reflexes     Gait  Gait: normal    Tremor   Resting tremor: absent           Lab Results   Component Value Date/Time    WBC 4.0 02/16/2021 01:04 AM    WBC (POC) 3.5 (A) 06/19/2019 10:55 AM    HGB 12.2 02/16/2021 01:04 AM    HGB (POC) 12.4 06/19/2019 10:55 AM    HCT 36.8 02/16/2021 01:04 AM    HCT (POC) 37.1 06/19/2019 10:55 AM    PLATELET 859 83/23/1885 01:04 AM    PLATELET (POC) 591 82/12/6323 10:55 AM    MCV 85.0 02/16/2021 01:04 AM    MCV (POC) 88.0 06/19/2019 10:55 AM     Lab Results   Component Value Date/Time    Hemoglobin A1c 5.7 (H) 02/16/2021 01:04 AM    Glucose 100 02/16/2021 01:04 AM    Glucose (POC) 110 (H) 02/16/2021 12:24 AM    LDL, calculated 136.4 (H) 02/16/2021 09:01 AM    Creatinine (POC) 0.6 05/08/2017 04:09 PM    Creatinine 0.67 02/16/2021 01:04 AM      Lab Results   Component Value Date/Time Cholesterol, total 204 (H) 02/16/2021 09:01 AM    Cholesterol (POC) 222 (A) 04/26/2019 11:22 AM    HDL Cholesterol 52 02/16/2021 09:01 AM    LDL, calculated 136.4 (H) 02/16/2021 09:01 AM    LDL Cholesterol (POC) 149 (A) 04/26/2019 11:22 AM    Triglyceride 78 02/16/2021 09:01 AM    Triglycerides (POC) 84 04/26/2019 11:22 AM    CHOL/HDL Ratio 3.9 02/16/2021 09:01 AM     Lab Results   Component Value Date/Time    ALT (SGPT) 18 02/16/2021 01:04 AM    Alk. phosphatase 69 02/16/2021 01:04 AM    Bilirubin, total 0.4 02/16/2021 01:04 AM    Albumin 4.0 02/16/2021 01:04 AM    Protein, total 7.6 02/16/2021 01:04 AM    INR 1.1 02/16/2021 01:04 AM    Prothrombin time 11.1 02/16/2021 01:04 AM    PLATELET 334 73/60/9082 01:04 AM    PLATELET (POC) 171 61/54/7512 10:55 AM        CT Results (maximum last 3): Results from Hospital Encounter encounter on 02/16/21   CT CODE NEURO HEAD WO CONTRAST    Narrative EXAM: CT CODE NEURO HEAD WO CONTRAST    INDICATION: left leg numbness, vision changes    COMPARISON: None. CONTRAST: None. TECHNIQUE: Unenhanced CT of the head was performed using 5 mm images. Brain and  bone windows were generated. Coronal and sagittal reformats. CT dose reduction  was achieved through use of a standardized protocol tailored for this  examination and automatic exposure control for dose modulation. FINDINGS:  The ventricles and sulci are enlarged. There is periventricular hypoattenuation  compatible with chronic small vessel ischemic changes. There is no intracranial  hemorrhage, extra-axial collection, or mass effect. The basilar cisterns are  open. No CT evidence of acute infarct. The bone windows demonstrate no abnormalities. The visualized portions of the  paranasal sinuses and mastoid air cells are clear. Impression No acute findings. Results from Abstract encounter on 11/08/19   CT ABD PELV WO CONT       MRI Results (maximum last 3):   Results from [place] encounter on 02/16/21   MRA BRAIN WO CONT    Narrative INDICATION:   cva/tia    COMPARISON:  None    TECHNIQUE:    3-D time-of-flight MRA of the brain was performed. Multiplanar reconstructions  were obtained. FINDINGS:  Patent bilateral intracranial internal carotid arteries, without hemodynamically  significant stenosis. The bilateral middle and anterior cerebral arteries are  patent, although the right A1 segment is somewhat diminutive compared to the  left. The anterior communicating artery is patent. Vertebral arteries are patent bilaterally. The basilar artery is patent. The  bilateral posterior communicating arteries are diminutive. The bilateral P1  segments are widely patent. The posterior cerebral arteries are patent. No intracranial aneurysm. No intracranial hemodynamically significant stenosis. Impression No acute vascular abnormality, no large vessel occlusion. [facility] MRI BRAIN WO CONT    Narrative EXAM: MRI BRAIN WO CONT    INDICATION: cva/tia    COMPARISON: None. CONTRAST: None. TECHNIQUE:    Multiplanar multisequence acquisition without contrast of the brain. FINDINGS:  Periventricular and subcortical T2/FLAIR white matter hyperintensities  compatible with chronic small vessel ischemic disease. The ventricles are normal in size and position. There is no acute infarct,  hemorrhage, extra-axial fluid collection, or mass effect. There is no cerebellar  tonsillar herniation. Expected arterial flow-voids are present. The paranasal sinuses, mastoid air cells, and middle ears are clear. The orbital  contents are within normal limits. No significant osseous or scalp lesions are  identified. Impression No acute intracranial abnormality. Sequela of chronic small vessel ischemic  disease.    Results from [place] encounter on 03/05/20   MRI LUMB SPINE WO CONT    Narrative EXAM: MRI LUMB SPINE WO CONT    INDICATION: Chronic bilateral low back pain without sciatica M 54.5, G 89.29. Compression fracture of lumbar vertebra S 32.000S. Recent symptoms over last 9  days. COMPARISON: 8/29/2017 MRI    TECHNIQUE: MR imaging of the lumbar spine was performed using the following  sequences: sagittal T1, T2, STIR;  axial T1, T2.     CONTRAST:  None. FINDINGS:    Conus position, morphology and signal and normal. There are mild superior  endplate vertebral compression fractures demonstrated at T10, T12 and L4 with  endplate compression greatest centrally. Normal bone signal is shown throughout. Therefore, these fractures are remote etiology. The other vertebral body heights  are normal.    There is anatomic alignment through L5. L5-S1 shows 3-4 mm retrolisthesis. No paraspinal soft tissue mass is shown. The visualized superior portions of the  sacrum and SI joints are notable for mild left SI osteoarthrosis. T10-11: Shown on sagittal images only. No disc herniation, substantial facet  arthropathy or canal-foraminal stenosis. T11-12: Normal disc height. Minimal diffuse disc bulging. No facet arthropathy  or canal-foraminal stenosis. T12-L1: Normal disc and facets. L1-L2: Nearly normal disc height. Mild diffuse disc bulging. No facet  arthropathy. No canal or foraminal stenosis. L2-L3: Nearly normal disc height. Mild diffuse disc bulging, accentuated  slightly in the lateral regions bilaterally. Minimal left facet osteoarthrosis. No canal or foraminal stenosis. L3-L4: Mild disc space narrowing and diffuse disc bulging. Mild bilateral facet  osteoarthrosis. Borderline-mild canal stenosis. Mild bilateral foraminal  narrowing. L4-L5: Mild disc space narrowing and leftward lateralizing diffuse disc bulging,  accentuated in the left foraminal region. Mild bilateral facet osteoarthrosis,  greater on the left. Mild canal stenosis. Left subarticular stenosis and  mild-moderate proximal foraminal stenosis.     L5-S1: Mild disc space narrowing and rightward lateralizing diffuse disc  bulging. Moderate bilateral facet osteoarthrosis. Moderate canal stenosis. Moderate right and mild left foraminal narrowing. Impression IMPRESSION:    1. Remote vertebral compression fractures at T10, T12 and L4. No acute vertebral  compression fracture demonstrated. 2. Multilevel degenerative changes as detailed by level above. Note accentuation  of disc bulging in the left foraminal region of L4-5 associated with  subarticular and foraminal stenosis. Note mild L4-5 and moderate L5-S1 canal  stenosis. VAS/US/Carotid Doppler Results (maximum last 3): No results found for this or any previous visit. PET Results (maximum last 3): No results found for this or any previous visit. Assessment and Plan        80-year-old woman who had sudden intense paresthesias of the left leg exquisitely. Given the duration of symptoms still persisting I would expect to have seen an acute process on imaging which there is none. I do not think this is a stroke nor do I think it is a TIA. I think this is likely related to her history of L-spine disease. The visual changes are hard to localize given that this might have been provoked by the nighttime driving in the rain. Because she is now prediabetes it is reasonable for her to start taking a low-dose baby aspirin daily for heart health and stroke prevention. She does report drinking a lot of soda now during the pandemic so I advised her to stop that. Preliminary echo looks unrevealing. Carotid Dopplers are pending which is appropriate. I think once her work-up is done today I have no restrictions on her to remain in house neurologically. We did stroke education. If her leg symptoms get worse she might need neuroimaging of the low back. Questions were answered in detail. I also spoke with her daughter personally at the bedside. During this evaluation, we also discussed stroke education to include signs and symptoms of stroke and TIA.    This clinical note was dictated with an electronic dictation software that can make unintentional errors. If there are any questions, please contact me directly for clarification.       [address], DO  NEUROLOGIST  Diplomate HANNA  2/16/2021

## 2021-02-16 NOTE — PROGRESS NOTES
Consult received and appreciated. Reviewed chart note MRI negative for acute infarct and no concerns regarding speech or swallowing function. Formal SLP evaluation is not clinically indicated at this time. Please re-consult if further needs arise. Thanks. Sourav Moy M.CD.  CCC-SLP

## 2021-02-16 NOTE — DISCHARGE INSTRUCTIONS
Patient Discharge Instructions    Denilson Meredith / [id number] : 1940    Admitted 2021 Discharged: 2021     Take Home Medications       · It is important that you take the medication exactly as they are prescribed. · Keep your medication in the bottles provided by the pharmacist and keep a list of the medication names, dosages, and times to be taken in your wallet. · Do not take other medications without consulting your doctor. What to do at Home    Recommended diet: Cardiac Diet. Recommended activity: Activity as tolerated. Follow-up with Dr. Jimenez Crowe in 1 week. Information obtained by :  I understand that if any problems occur once I am at home I am to contact my physician. I understand and acknowledge receipt of the instructions indicated above.                                                                                                                                            Physician's or R.N.'s Signature                                                                  Date/Time                                                                                                                                              Patient or Representative Signature                                                          Date/Time

## 2021-02-16 NOTE — ROUTINE PROCESS
Occupational therapy 6904 -  
[phone] Orders acknowledged and chart reviewed in prep for OT evaluation, ECHO currently at bedside. Will defer and f/u for evaluation. Thank you. Ja Grubbs MS, OTR/L

## 2021-02-16 NOTE — PROGRESS NOTES
Care Management -     14:15 Received call from [facility] RN to request CM deliver Code 44 letter. CM printed Code 40, MOON, and State Observation Notice. CM went to ED to deliver information. Patient was discharged and removed from CC at 14:24. CM unable to deliver. Updated [facility] RN.     Albino Goldmann, MSW

## 2021-02-16 NOTE — ED PROVIDER NOTES
HPI   [de-identified] yo F presents with numbness to left leg to foot sudden onset at 10:30pm, onset while walking. Denies cp, sob, headache, neck or back pain, bowel/bladder incontinence or saddle anesthesia. C/o visual changes with lights, white halo around lights with rainbow colors onset 30 min.   Past Medical History:   Diagnosis Date    Diverticulitis     Dystrophy, cornea     Environmental allergies     Family history of skin cancer     GERD (gastroesophageal reflux disease)     Hypotension     Ill-defined condition     Hyponatremia    Multiple drug allergies     Osteoporosis     Shingles 05/2019    Sun-damaged skin     Vertebral compression fracture ([address] 01/05/12    Vocal cord anomaly     vocal cord dysfunction per speech therapist       Past Surgical History:   Procedure Laterality Date    COLONOSCOPY N/A 7/7/2017    COLONOS[facility] performed by Tessa Espana MD at [facility] ENDOSCOPY    [facility] [address] HX CATARACT REMOVAL  07/2012    HX GYN      D&C    HX MOHS PROCEDURES  04/11/2017    [facility] left cheek by Dr. Martha Reno         Family History:   Problem Relation Age of Onset    Heart Disease Mother     Cancer Father 61        lung    Cataract Daughter         congenital cataracts    Heart Disease Daughter         fast heart rythmn       Social History     Socioeconomic History    Marital status:      Spouse name: Not on file    Number of children: Not on file    Years of education: Not on file    Highest education level: Not on file   Occupational History    Not on file   Social Needs    Financial resource strain: Not on file    Food insecurity     Worry: Not on file     Inability: Not on file    Transportation needs     Medical: Not on file     Non-medical: Not on file   Tobacco Use    Smoking status: Never Smoker    Smokeless tobacco: Never Used   Substance and Sexual Activity    Alcohol use: No    Drug use: No    Sexual activity: Not on file   Lifestyle    Physical activity     Days per week: Not on file     Minutes per session: Not on file    Stress: Not on file   Relationships    Social connections     Talks on phone: Not on file     Gets together: Not on file     Attends Jainism service: Not on file     Active member of club or organization: Not on file     Attends meetings of clubs or organizations: Not on file     Relationship status: Not on file    Intimate partner violence     Fear of current or ex partner: Not on file     Emotionally abused: Not on file     Physically abused: Not on file     Forced sexual activity: Not on file   Other Topics Concern    Not on file   Social History Narrative    Not on file         ALLERGIES: Clindamycin, Ketek [telithromycin], Levaquin [levofloxacin], Nexium [esomeprazole magnesium], Sudafed [pseudoephedrine hcl], Voltaren [diclofenac sodium], Xyzal [levocetirizine], Ceftin [cefuroxime axetil], Cortisone, Methylprednisolone, Mobic [meloxicam], Vibramycin [doxycycline calcium], Afrin [pseudoephedrine sulfate], Antihistamine-1, Azithromycin, Bactrim [sulfamethoprim ds], Biaxin [clarithromycin], Ciprofloxacin, Codeine, Doxycycline, Flagyl [metronidazole], Gabapentin, Pcn [penicillins], and Restasis [cyclosporine]    Review of Systems   Constitutional: Negative for chills and fever. Eyes: Positive for visual disturbance. Respiratory: Negative for cough and shortness of breath. Cardiovascular: Negative for chest pain. Gastrointestinal: Negative for abdominal pain, constipation, diarrhea, nausea and vomiting. Genitourinary: Negative for dysuria and hematuria. Musculoskeletal: Negative for back pain. Skin: Negative for rash. Neurological: Positive for numbness. Negative for dizziness, speech difficulty, weakness and headaches. All other systems reviewed and are negative.       Vitals:    02/16/21 0008   BP: (!) 149/80   Pulse: 88   Resp: 16   Temp: 96.8 °F (36 °C)   SpO2: 96%            Physical Exam   Physical Examination: General appearance - alert, well appearing, and in no distress, oriented to person, place, and time and normal appearing weight  Eyes - pupils equal and reactive, extraocular eye movements intact  Neck - supple, no significant adenopathy  Chest - clear to auscultation, no wheezes, rales or rhonchi, symmetric air entry  Heart - normal rate, regular rhythm, normal S1, S2, no murmurs, rubs, clicks or gallops  Abdomen - soft, nontender, nondistended, no masses or organomegaly  Back exam - full range of motion, no tenderness, palpable spasm or pain on motion  Neurological - alert, oriented, normal speech, no focal findings or movement disorder noted, normal f-n-f, no nystagmus, no pronator drift  Musculoskeletal - no joint tenderness, deformity or swelling  Extremities - peripheral pulses normal, no pedal edema, no clubbing or cyanosis  Skin - normal coloration and turgor, no rashes, no suspicious skin lesions noted  MDM  Number of Diagnoses or Management Options     Amount and/or Complexity of Data Reviewed  Clinical lab tests: ordered and reviewed  Tests in the radiology section of CPT®: ordered and reviewed  Decide to obtain previous medical records or to obtain history from someone other than the patient: yes  Review and summarize past medical records: yes  Discuss the patient with other providers: yes (Teleneurology, hospitalist)  Independent visualization of images, tracings, or specimens: yes    Patient Progress  Patient progress: stable         Procedures    ED EKG interpretation:  Rhythm: normal sinus rhythm; and regular . Rate (approx.): 74; Axis: normal; P wave: normal; QRS interval: prolonged; ST/T wave: non-specific changes;   EKG documented by Gaby Rodríguez MD    12:20 AM  Neuro interventional nurse practitioner at bedside. 12:34 AM  Discussed with Dr. Tracie Tadeo, teleneurology. He will evaluate patient.    1:01 AM  Discussed with Dr. Doc Hernandez teleneurology. He has evaluated patient.   He recommends admission for CVA work-up. Does not recommend TPA at this time. He reports patient with left face left arm and left leg numbness. He also states patient refused TPA. Recommends aspirin 325 and permissive hypertension to 327 systolic. Perfect Serve Consult for Admission  1:03 AM    ED Room Number: ER10/10  Patient Name and age: Pat Agosto [de-identified] y.o.  female  Working Diagnosis:   1. Left sided numbness    2.  Cerebrovascular accident (CVA), unspecified mechanism ([address]        COVID-19 Suspicion:  no  Sepsis present:  no  Reassessment needed: no  Code Status:  Full Code  Readmission: no  Isolation Requirements:  no  Recommended Level of Care:  telemetry  Department:[facility] Adult ED - 21     Total critical care time spent exclusive of procedures:  45 min

## 2021-02-16 NOTE — Clinical Note
Patient Class[de-identified] OBSERVATION [104]   Type of Bed: Neuro/Stroke [9]   Reason for Observation: numbness   Admitting Diagnosis: Numbness [252926]   Admitting Physician: Elio Villalta   Attending Physician: Elio Villalta   Comments: Changing to observation status.

## 2021-02-16 NOTE — PROGRESS NOTES
Med/PCP    Events noted. Pt with mild LLE numbness without other neurologic sx's. She does not want to stay in hospital.  She agrees to get MRI brain & carotid dopplers -- If OK, should be able to go home with outpt f/u. Start ASA every day.     Mluu Head MD

## 2021-02-17 LAB
LEFT CCA DIST DIAS: 15.7 CM/S
LEFT CCA DIST SYS: 63 CM/S
LEFT CCA PROX DIAS: 15.7 CM/S
LEFT CCA PROX SYS: 71.8 CM/S
LEFT ECA DIAS: 7.01 CM/S
LEFT ECA SYS: 86.4 CM/S
LEFT ICA DIST DIAS: 14.1 CM/S
LEFT ICA DIST SYS: 45.2 CM/S
LEFT ICA MID DIAS: 21.6 CM/S
LEFT ICA MID SYS: 82.4 CM/S
LEFT ICA PROX DIAS: 24.2 CM/S
LEFT ICA PROX SYS: 103.6 CM/S
LEFT ICA/CCA SYS: 1.64
LEFT VERTEBRAL DIAS: 10.89 CM/S
LEFT VERTEBRAL SYS: 43.6 CM/S
RIGHT CCA DIST DIAS: 10.7 CM/S
RIGHT CCA DIST SYS: 54.4 CM/S
RIGHT CCA PROX DIAS: 10.7 CM/S
RIGHT CCA PROX SYS: 70.1 CM/S
RIGHT ECA DIAS: 3.04 CM/S
RIGHT ECA SYS: 86.4 CM/S
RIGHT ICA DIST DIAS: 7 CM/S
RIGHT ICA DIST SYS: 28 CM/S
RIGHT ICA MID DIAS: 15.1 CM/S
RIGHT ICA MID SYS: 75.3 CM/S
RIGHT ICA PROX DIAS: 12.3 CM/S
RIGHT ICA PROX SYS: 50 CM/S
RIGHT ICA/CCA SYS: 1.4
RIGHT VERTEBRAL DIAS: 11.6 CM/S
RIGHT VERTEBRAL SYS: 36.5 CM/S

## 2021-04-02 ENCOUNTER — TRANSCRIBE ORDER (OUTPATIENT)
Dept: SCHEDULING | Age: 81
End: 2021-04-02

## 2021-04-02 DIAGNOSIS — M54.50 CHRONIC LEFT-SIDED LOW BACK PAIN: Primary | ICD-10-CM

## 2021-04-02 DIAGNOSIS — S32.000S COMPRESSION FRACTURE OF LUMBAR VERTEBRA, SEQUELA: ICD-10-CM

## 2021-04-02 DIAGNOSIS — G89.29 CHRONIC LEFT-SIDED LOW BACK PAIN: Primary | ICD-10-CM

## 2021-04-05 ENCOUNTER — TRANSCRIBE ORDER (OUTPATIENT)
Dept: SCHEDULING | Age: 81
End: 2021-04-05

## 2021-04-05 DIAGNOSIS — G89.29 CHRONIC LOW BACK PAIN WITHOUT SCIATICA, UNSPECIFIED BACK PAIN LATERALITY: Primary | ICD-10-CM

## 2021-04-05 DIAGNOSIS — M54.50 CHRONIC LOW BACK PAIN WITHOUT SCIATICA, UNSPECIFIED BACK PAIN LATERALITY: Primary | ICD-10-CM

## 2021-04-06 ENCOUNTER — HOSPITAL ENCOUNTER (OUTPATIENT)
Dept: MRI IMAGING | Age: 81
Discharge: HOME | End: 2021-04-06
Payer: MEDICARE

## 2021-04-06 DIAGNOSIS — G89.29 CHRONIC LOW BACK PAIN WITHOUT SCIATICA, UNSPECIFIED BACK PAIN LATERALITY: ICD-10-CM

## 2021-04-06 DIAGNOSIS — M54.50 CHRONIC LOW BACK PAIN WITHOUT SCIATICA, UNSPECIFIED BACK PAIN LATERALITY: ICD-10-CM

## 2021-04-06 PROCEDURE — 72148 MRI LUMBAR SPINE W/O DYE: CPT

## 2021-04-07 ENCOUNTER — TRANSCRIBE ORDER (OUTPATIENT)
Dept: SCHEDULING | Age: 81
End: 2021-04-07

## 2021-04-07 DIAGNOSIS — Z12.31 SCREENING MAMMOGRAM FOR HIGH-RISK PATIENT: Primary | ICD-10-CM

## 2021-04-15 ENCOUNTER — TRANSCRIBE ORDER (OUTPATIENT)
Dept: SCHEDULING | Age: 81
End: 2021-04-15

## 2021-04-15 DIAGNOSIS — H57.89 RED EYE: ICD-10-CM

## 2021-04-15 DIAGNOSIS — H57.11 EYE PAIN, RIGHT: ICD-10-CM

## 2021-04-15 DIAGNOSIS — I67.1 C-C FISTULA: Primary | ICD-10-CM

## 2021-04-29 ENCOUNTER — TRANSCRIBE ORDER (OUTPATIENT)
Dept: SCHEDULING | Age: 81
End: 2021-04-29

## 2021-04-29 DIAGNOSIS — R10.2 FEMALE PELVIC PAIN: Primary | ICD-10-CM

## 2021-05-03 ENCOUNTER — HOSPITAL ENCOUNTER (OUTPATIENT)
Dept: MRI IMAGING | Age: 81
Discharge: HOME | End: 2021-05-03
Attending: PHYSICIAN ASSISTANT
Payer: MEDICARE

## 2021-05-03 DIAGNOSIS — R10.2 FEMALE PELVIC PAIN: ICD-10-CM

## 2021-05-03 PROCEDURE — 72195 MRI PELVIS W/O DYE: CPT

## 2021-05-04 ENCOUNTER — TRANSCRIBE ORDER (OUTPATIENT)
Dept: SCHEDULING | Age: 81
End: 2021-05-04

## 2021-05-04 DIAGNOSIS — I67.1 CEREBRAL ANEURYSM, NONRUPTURED: Primary | ICD-10-CM

## 2021-05-04 DIAGNOSIS — H57.11 PAIN IN RIGHT EYE: ICD-10-CM

## 2021-05-04 DIAGNOSIS — H57.89 RED EYE: ICD-10-CM

## 2021-05-07 ENCOUNTER — HOSPITAL ENCOUNTER (OUTPATIENT)
Dept: MRI IMAGING | Age: 81
Discharge: HOME | End: 2021-05-07
Attending: OPHTHALMOLOGY
Payer: MEDICARE

## 2021-05-07 ENCOUNTER — HOSPITAL ENCOUNTER (OUTPATIENT)
Dept: MRI IMAGING | Age: 81
End: 2021-05-07
Attending: OPHTHALMOLOGY
Payer: MEDICARE

## 2021-05-07 DIAGNOSIS — I67.1 C-C FISTULA: ICD-10-CM

## 2021-05-07 DIAGNOSIS — H57.89 RED EYE: ICD-10-CM

## 2021-05-07 DIAGNOSIS — H57.11 PAIN IN RIGHT EYE: ICD-10-CM

## 2021-05-07 DIAGNOSIS — H57.11 EYE PAIN, RIGHT: ICD-10-CM

## 2021-05-07 DIAGNOSIS — I67.1 CEREBRAL ANEURYSM, NONRUPTURED: ICD-10-CM

## 2021-05-07 PROCEDURE — 70544 MR ANGIOGRAPHY HEAD W/O DYE: CPT

## 2021-05-07 PROCEDURE — 70551 MRI BRAIN STEM W/O DYE: CPT

## 2021-06-07 ENCOUNTER — HOSPITAL ENCOUNTER (OUTPATIENT)
Dept: MAMMOGRAPHY | Age: 81
Discharge: HOME | End: 2021-06-07
Attending: INTERNAL MEDICINE
Payer: MEDICARE

## 2021-06-07 DIAGNOSIS — Z12.31 SCREENING MAMMOGRAM FOR HIGH-RISK PATIENT: ICD-10-CM

## 2021-06-07 PROCEDURE — 77063 BREAST TOMOSYNTHESIS BI: CPT

## 2021-10-09 ENCOUNTER — HOSPITAL ENCOUNTER (EMERGENCY)
Age: 81
Discharge: HOME | End: 2021-10-09
Attending: EMERGENCY MEDICINE
Payer: MEDICARE

## 2021-10-09 ENCOUNTER — APPOINTMENT (OUTPATIENT)
Dept: GENERAL RADIOLOGY | Age: 81
End: 2021-10-09
Attending: EMERGENCY MEDICINE
Payer: MEDICARE

## 2021-10-09 VITALS
BODY MASS INDEX: 21.6 KG/M2 | DIASTOLIC BLOOD PRESSURE: 78 MMHG | WEIGHT: 126.54 LBS | HEART RATE: 74 BPM | RESPIRATION RATE: 18 BRPM | TEMPERATURE: 98.4 F | SYSTOLIC BLOOD PRESSURE: 111 MMHG | HEIGHT: 64 IN | OXYGEN SATURATION: 98 %

## 2021-10-09 DIAGNOSIS — R06.02 SOB (SHORTNESS OF BREATH): ICD-10-CM

## 2021-10-09 DIAGNOSIS — R07.9 CHEST PAIN, UNSPECIFIED TYPE: Primary | ICD-10-CM

## 2021-10-09 LAB
ALBUMIN SERPL-MCNC: 3.9 G/DL (ref 3.5–5)
ALBUMIN/GLOB SERPL: 1 {RATIO} (ref 1.1–2.2)
ALP SERPL-CCNC: 59 U/L (ref 45–117)
ALT SERPL-CCNC: 17 U/L (ref 12–78)
ANION GAP SERPL CALC-SCNC: 6 MMOL/L (ref 5–15)
AST SERPL-CCNC: 16 U/L (ref 15–37)
BASOPHILS # BLD: 0.1 K/UL (ref 0–0.1)
BASOPHILS NFR BLD: 1 % (ref 0–1)
BILIRUB SERPL-MCNC: 0.3 MG/DL (ref 0.2–1)
BNP SERPL-MCNC: 206 PG/ML (ref 0–450)
BUN SERPL-MCNC: 14 MG/DL (ref 6–20)
BUN/CREAT SERPL: 19 (ref 12–20)
CALCIUM SERPL-MCNC: 9.3 MG/DL (ref 8.5–10.1)
CHLORIDE SERPL-SCNC: 100 MMOL/L (ref 97–108)
CO2 SERPL-SCNC: 29 MMOL/L (ref 21–32)
CREAT SERPL-MCNC: 0.72 MG/DL (ref 0.55–1.02)
DIFFERENTIAL METHOD BLD: ABNORMAL
EOSINOPHIL # BLD: 0.7 K/UL (ref 0–0.4)
EOSINOPHIL NFR BLD: 11 % (ref 0–7)
ERYTHROCYTE [DISTWIDTH] IN BLOOD BY AUTOMATED COUNT: 14.1 % (ref 11.5–14.5)
FLUAV AG NPH QL IA: NEGATIVE
FLUBV AG NOSE QL IA: NEGATIVE
GLOBULIN SER CALC-MCNC: 3.8 G/DL (ref 2–4)
GLUCOSE SERPL-MCNC: 100 MG/DL (ref 65–100)
HCT VFR BLD AUTO: 39.6 % (ref 35–47)
HGB BLD-MCNC: 12.8 G/DL (ref 11.5–16)
IMM GRANULOCYTES # BLD AUTO: 0 K/UL (ref 0–0.04)
IMM GRANULOCYTES NFR BLD AUTO: 0 % (ref 0–0.5)
LYMPHOCYTES # BLD: 1.2 K/UL (ref 0.8–3.5)
LYMPHOCYTES NFR BLD: 19 % (ref 12–49)
MCH RBC QN AUTO: 28.6 PG (ref 26–34)
MCHC RBC AUTO-ENTMCNC: 32.3 G/DL (ref 30–36.5)
MCV RBC AUTO: 88.6 FL (ref 80–99)
MONOCYTES # BLD: 0.5 K/UL (ref 0–1)
MONOCYTES NFR BLD: 9 % (ref 5–13)
NEUTS SEG # BLD: 3.8 K/UL (ref 1.8–8)
NEUTS SEG NFR BLD: 60 % (ref 32–75)
NRBC # BLD: 0 K/UL (ref 0–0.01)
NRBC BLD-RTO: 0 PER 100 WBC
PLATELET # BLD AUTO: 223 K/UL (ref 150–400)
PMV BLD AUTO: 9.8 FL (ref 8.9–12.9)
POTASSIUM SERPL-SCNC: 4.2 MMOL/L (ref 3.5–5.1)
PROT SERPL-MCNC: 7.7 G/DL (ref 6.4–8.2)
RBC # BLD AUTO: 4.47 M/UL (ref 3.8–5.2)
SODIUM SERPL-SCNC: 135 MMOL/L (ref 136–145)
TROPONIN-HIGH SENSITIVITY: 6 NG/L (ref 0–51)
TROPONIN-HIGH SENSITIVITY: 6 NG/L (ref 0–51)
WBC # BLD AUTO: 6.3 K/UL (ref 3.6–11)

## 2021-10-09 PROCEDURE — 99284 EMERGENCY DEPT VISIT MOD MDM: CPT

## 2021-10-09 PROCEDURE — 80053 COMPREHEN METABOLIC PANEL: CPT

## 2021-10-09 PROCEDURE — 71046 X-RAY EXAM CHEST 2 VIEWS: CPT

## 2021-10-09 PROCEDURE — 83880 ASSAY OF NATRIURETIC PEPTIDE: CPT

## 2021-10-09 PROCEDURE — 84484 ASSAY OF TROPONIN QUANT: CPT

## 2021-10-09 PROCEDURE — 36415 COLL VENOUS BLD VENIPUNCTURE: CPT

## 2021-10-09 PROCEDURE — 85025 COMPLETE CBC W/AUTO DIFF WBC: CPT

## 2021-10-09 PROCEDURE — 87804 INFLUENZA ASSAY W/OPTIC: CPT

## 2021-10-09 PROCEDURE — 93005 ELECTROCARDIOGRAM TRACING: CPT

## 2021-10-09 NOTE — ED TRIAGE NOTES
Pt reports intermittent CP and SOB over the past week. Pt reports normally the SOB is with activity but today she was sitting and talking about an hour ago and the SOB started and the pressure in her chest started. Pt reports she was tested for COVID on Thursday and her PCR and rapid were negative. Pt reports she is vaccinated for COVID. Pt reports she started with a runny nose on Wednesday that lasted for 2 days but that resolved. Pt denies fevers.

## 2021-10-10 LAB
ATRIAL RATE: 75 BPM
CALCULATED P AXIS, ECG09: 51 DEGREES
CALCULATED R AXIS, ECG10: 77 DEGREES
CALCULATED T AXIS, ECG11: 6 DEGREES
DIAGNOSIS, 93000: NORMAL
P-R INTERVAL, ECG05: 124 MS
Q-T INTERVAL, ECG07: 400 MS
QRS DURATION, ECG06: 116 MS
QTC CALCULATION (BEZET), ECG08: 446 MS
VENTRICULAR RATE, ECG03: 75 BPM

## 2021-10-10 NOTE — ED PROVIDER NOTES
80year-old presents with shortness of breath and chest pressure. Its been ongoing for the past couple days. Her chest pressure turned to pain today around 4 PM.  [facility]. She denies any fevers but has had chills. She denies any current pain. She states that her shortness of breath and chest pain is intermittent. She denies any consistent modifying factors. She also reports associated runny nose.            Past Medical History:   Diagnosis Date    Diverticulitis     Dystrophy, cornea     Environmental allergies     Family history of skin cancer     GERD (gastroesophageal reflux disease)     Hypotension     Ill-defined condition     Hyponatremia    Menopause     LMP-unknown    Multiple drug allergies     Osteoporosis     Dr. Rachel Mayo at [address] 05/2019    Sun-damaged skin     Vertebral compression fracture ([address] 01/05/12    Vocal cord anomaly     vocal cord dysfunction per speech therapist       Past Surgical History:   Procedure Laterality Date    COLONOSCOPY N/A 7/7/2017    COLONOS[facility] performed by Georgiana Calr MD at [facility] ENDOSCOPY     [facility] HX CATARACT REMOVAL  07/2012    HX GYN      D&C    HX MOHS PROCEDURES  04/11/2017    [address] left cheek by Dr. Suraj Cordero         Family History:   Problem Relation Age of Onset    Heart Disease Mother     Cancer Father 61        lung    Cataract Daughter         congenital cataracts    Heart Disease Daughter         fast heart rythmn       Social History     Socioeconomic History    Marital status:      Spouse name: Not on file    Number of children: Not on file    Years of education: Not on file    Highest education level: Not on file   Occupational History    Not on file   Tobacco Use    Smoking status: Never Smoker    Smokeless tobacco: Never Used   Substance and Sexual Activity    Alcohol use: No    Drug use: No    Sexual activity: Not on file   Other Topics Concern    Not on file   Social History Narrative    Not on file     Social Determinants of Health     Financial Resource Strain:     Difficulty of Paying Living Expenses:    Food Insecurity:     Worried About Running Out of Food in the Last Year:     [address] in the Last Year:    Transportation Needs:     Lack of Transportation (Medical):  Lack of Transportation (Non-Medical):    Physical Activity:     Days of Exercise per Week:     Minutes of Exercise per Session:    Stress:     Feeling of Stress :    Social Connections:     Frequency of Communication with Friends and Family:     Frequency of Social Gatherings with Friends and Family:     Attends Restorationist Services:     Active Member of Clubs or Organizations:     Attends Club or Organization Meetings:     Marital Status:    Intimate Partner Violence:     Fear of Current or Ex-Partner:     Emotionally Abused:     Physically Abused:     Sexually Abused: ALLERGIES: Clindamycin, Ketek [telithromycin], Levaquin [levofloxacin], Nexium [esomeprazole magnesium], Sudafed [pseudoephedrine hcl], Voltaren [diclofenac sodium], Xyzal [levocetirizine], Ceftin [cefuroxime axetil], Cortisone, Methylprednisolone, Mobic [meloxicam], Vibramycin [doxycycline calcium], Afrin [pseudoephedrine sulfate], Antihistamine-1, Azithromycin, Bactrim [sulfamethoprim ds], Biaxin [clarithromycin], Ciprofloxacin, Codeine, Doxycycline, Flagyl [metronidazole], Gabapentin, Pcn [penicillins], and Restasis [cyclosporine]    Review of Systems   All other systems reviewed and are negative. Vitals:    10/09/21 1707   BP: 115/88   Pulse: (!) 57   Resp: 16   Temp: 98.5 °F (36.9 °C)   SpO2: 99%   Weight: 57.4 kg (126 lb 8.7 oz)   Height: 5' 4\" (1.626 m)            Physical Exam  Vitals and nursing note reviewed. Constitutional:       General: She is not in acute distress.      Comments: Elderly female no acute distress   HENT:      Head: Normocephalic and atraumatic. Mouth/Throat:      Mouth: Mucous membranes are moist.   Eyes:      General: No scleral icterus. Conjunctiva/sclera: Conjunctivae normal.      Pupils: Pupils are equal, round, and reactive to light. Neck:      Trachea: No tracheal deviation. Cardiovascular:      Rate and Rhythm: Normal rate and regular rhythm. Pulses: Normal pulses. Pulmonary:      Effort: Pulmonary effort is normal. No respiratory distress. Breath sounds: No decreased breath sounds, wheezing, rhonchi or rales. Abdominal:      General: There is no distension. Palpations: Abdomen is soft. Tenderness: There is no abdominal tenderness. Genitourinary:     Comments: deferred  Musculoskeletal:         General: No deformity. Cervical back: Neck supple. Right lower leg: No edema. Left lower leg: No edema. Skin:     General: Skin is warm and dry. Neurological:      General: No focal deficit present. Mental Status: She is alert. Psychiatric:         Mood and Affect: Mood normal.          MDM  Number of Diagnoses or Management Options  Chest pain, unspecified type  SOB (shortness of breath)  Diagnosis management comments: 49-year-old female presents with chest pain and shortness of breath. She had 2 cardiac enzymes that were negative. I offered admission for rule out ACS. She elected to follow-up as an outpatient. Her chest x-ray shows a mild interstitial prominence possibly secondary to a viral illness. She has had a recent Covid text that was negative. She is vaccinated and has a booster shot. EKG shows normal sinus rhythm at rate of 75, right bundle branch block, inverted T waves in the anterior precordial leads likely secondary to right bundle branch block. Procedures    8:39 PM  Patient re-evaluated. All questions answered. Patient appropriate for discharge. Given return precautions and follow up instructions.      LABORATORY TESTS:  Labs Reviewed   CBC WITH AUTOMATED DIFF - Abnormal; Notable for the following components:       Result Value    EOSINOPHILS 11 (*)     ABS. EOSINOPHILS 0.7 (*)     All other components within normal limits   METABOLIC PANEL, COMPREHENSIVE - Abnormal; Notable for the following components:    Sodium 135 (*)     A-G Ratio 1.0 (*)     All other components within normal limits   TROPONIN-HIGH SENSITIVITY   NT-PRO BNP   INFLUENZA A+B VIRAL AGS   TROPONIN-HIGH SENSITIVITY   SAMPLES BEING HELD       IMAGING RESULTS:  XR CHEST PA LAT   Final Result    Stable bibasilar atelectasis and mild interstitial prominence. .  . MEDICATIONS GIVEN:  Medications - No data to display    IMPRESSION:  1. Chest pain, unspecified type    2. SOB (shortness of breath)        PLAN:  1. Current Discharge Medication List        2. Follow-up Information     Follow up With Specialties Details Why Contact Info    Kale Voss MD Internal Medicine Schedule an appointment as soon as possible for a visit   [address]  [phone]      [facility] Emergency Medicine  If symptoms worsen or new concerns Barbi Sparks [facility] [address]    Oscar Coppola MD Cardiology   [facility]  [facility]  [address] [place] [address]  [phone]          3. Return to ED for new or worsening symptoms       Debra Ralph. Shayy Jacob MD        Please note that this dictation was completed with [employer], the computer voice recognition software. Quite often unanticipated grammatical, syntax, homophones, and other interpretive errors are inadvertently transcribed by the computer software. Please disregard these errors. Please excuse any errors that have escaped final proofreading.

## 2021-10-10 NOTE — ED NOTES
Dr. Ralph Roth reviewed discharge instructions with the patient. The patient verbalized understanding. Patient ambulated out of the emergency department without assistance. Patient is in no apparent distress.

## 2021-10-10 NOTE — ED NOTES
Patient ambulated around the nurse's station without assistance attached to pulse oximeter. Patient maintained O2 saturations above 97% entire time. Patient is in no apparent distress.

## 2021-10-22 ENCOUNTER — OFFICE VISIT (OUTPATIENT)
Dept: CARDIOLOGY CLINIC | Age: 81
End: 2021-10-22
Payer: MEDICARE

## 2021-10-22 VITALS
RESPIRATION RATE: 16 BRPM | OXYGEN SATURATION: 98 % | WEIGHT: 126 LBS | HEART RATE: 90 BPM | BODY MASS INDEX: 21.51 KG/M2 | HEIGHT: 64 IN | DIASTOLIC BLOOD PRESSURE: 62 MMHG | SYSTOLIC BLOOD PRESSURE: 106 MMHG

## 2021-10-22 DIAGNOSIS — R07.89 OTHER CHEST PAIN: Primary | ICD-10-CM

## 2021-10-22 PROBLEM — R94.31 ABNORMAL EKG: Status: ACTIVE | Noted: 2021-10-22

## 2021-10-22 PROCEDURE — 1090F PRES/ABSN URINE INCON ASSESS: CPT | Performed by: SPECIALIST

## 2021-10-22 PROCEDURE — G0463 HOSPITAL OUTPT CLINIC VISIT: HCPCS | Performed by: SPECIALIST

## 2021-10-22 PROCEDURE — 99204 OFFICE O/P NEW MOD 45 MIN: CPT | Performed by: SPECIALIST

## 2021-10-22 RX ORDER — DIAZEPAM 2 MG/1
TABLET ORAL
COMMUNITY
Start: 2021-10-12 | End: 2022-03-14

## 2021-10-22 NOTE — PROGRESS NOTES
HISTORY OF PRESENT ILLNESS  Rajani Murray is a 80 y.o. female     SUMMARY:   Problem List  Date Reviewed: 10/22/2021        Codes Class Noted    Abnormal EKG ICD-10-CM: R94.31  ICD-9-CM: 794.31  10/22/2021        TIA (transient ischemic attack) ICD-10-CM: G45.9  ICD-9-CM: 435.9  2/16/2021        Numbness ICD-10-CM: R20.0  ICD-9-CM: 782.0  5/5/2020        Herpes zoster without complication K-22-IQ: H60.7  ICD-9-CM: 053.9  5/15/2019        Intractable pain ICD-10-CM: R52  ICD-9-CM: 780.96  5/15/2019        Hyponatremia ICD-10-CM: E87.1  ICD-9-CM: 276.1  5/16/2017        Multiple drug allergies ICD-10-CM: Z88.9  ICD-9-CM: V14.9  Unknown        Environmental allergies ICD-10-CM: Z91.09  ICD-9-CM: V15.09  Unknown        Osteoporosis ICD-10-CM: M81.0  ICD-9-CM: 733.00  12/23/2013        GERD (gastroesophageal reflux disease) ICD-10-CM: K21.9  ICD-9-CM: 530.81  12/23/2013              Current Outpatient Medications on File Prior to Visit   Medication Sig    diazePAM (VALIUM) 2 mg tablet     pantoprazole sodium (PANTOPRAZOLE PO) Take 40 mg by mouth daily.  traMADoL (ULTRAM) 50 mg tablet TAKE 1 TO 2 TABLETS BY MOUTH THREE TIMES DAILY AS NEEDED FOR PAIN. MAX DAILY AMOUNT 300MG (5 TABS)    aspirin delayed-release 81 mg tablet Take 1 Tab by mouth daily.  acetaminophen (TYLENOL) 325 mg tablet Take 325 mg by mouth every eight (8) hours.  therapeutic multivitamin (THERAGRAN) tablet Take 1 Tab by mouth daily.  CALCIUM CITRATE (CITRACAL PO) Take 1 Tab by mouth two (2) times a day.  polyethylene glycol (MIRALAX) 17 gram packet Take 17 g by mouth daily as needed.  docusate sodium (COLACE) 100 mg capsule Take 100 mg by mouth two (2) times daily as needed.  cholecalciferol, vitamin D3, 2,000 unit tab Take 1 Tab by mouth daily.  propranoloL (INDERAL) 10 mg tablet Take 1 Tab by mouth four (4) times daily as needed for Anxiety.  (Patient not taking: Reported on 10/22/2021)    triamcinolone acetonide (KENALOG) 0.1 % topical cream APPLY TO AFFECTED AREA TWO (2) TIMES DAILY AS NEEDED FOR SKIN IRRITATION. USE THIN LAYER (Patient not taking: Reported on 10/22/2021)    cyanocobalamin (VITAMIN B12) 500 mcg tablet Take 500 mcg by mouth daily. (Patient not taking: Reported on 10/22/2021)    Dexlansoprazole (DEXILANT) 60 mg CpDB Take 1 Cap by mouth daily. (Patient not taking: Reported on 10/22/2021)    loteprednol etabonate (LOTEMAX) 0.5 % ophthalmic suspension Administer 1 Drop to both eyes daily. (Patient not taking: Reported on 10/22/2021)     No current facility-administered medications on file prior to visit. CARDIOLOGY STUDIES TO DATE:  9/18 negative lexiscan  2/21 normal echo    Chief Complaint   Patient presents with    New Patient     HPI :  She is referred from the ER for cardiac evaluation. She has previously had cardiac testing with Mallika Guerrero which I reviewed and summarized above and she had an echo when she was in the hospital with neurologic event back in February which was normal.  About 2 weeks ago she began to notice episodic shortness of breath and chest pressure. This seemed to be worse in the morning and better as the day went on. She was having some intermittent chills. It was not significant enough that it stopped her from doing any of her usual activities. She became concerned and [facility] and then when the symptoms persisted she ended up in the emergency room at [facility] and had another Covid test.  At that time her chest x-ray showed some prominent interstitial markings but her white count was normal her brain natruretic peptide was normal and since then her symptoms have been gradually getting better each day. Yesterday she did not notice it at all. There is no history of hypertension or diabetes she is never smoked family history is negative for premature coronary disease and cholesterol has been okay.   CARDIAC ROS: negative for palpitations, syncope, orthopnea, paroxysmal nocturnal dyspnea, exertional chest pressure/discomfort, claudication, lower extremity edema    Family History   Problem Relation Age of Onset    Heart Disease Mother     Cancer Father 61        lung    Cataract Daughter         congenital cataracts    Heart Disease Daughter         fast heart rythmn       Past Medical History:   Diagnosis Date    Diverticulitis     Dystrophy, cornea     Environmental allergies     Family history of skin cancer     GERD (gastroesophageal reflux disease)     Hypotension     Ill-defined condition     Hyponatremia    Menopause     LMP-unknown    Multiple drug allergies     Osteoporosis     Dr. Jayne Carmen at [address] 05/2019    Sun-damaged skin     Vertebral compression fracture ([address] 01/05/12    Vocal cord anomaly     vocal cord dysfunction per speech therapist       GENERAL ROS:  A comprehensive review of systems was negative except for that written in the HPI.     Visit Vitals  /62 (BP 1 Location: Left upper arm, BP Patient Position: Sitting, BP Cuff Size: Adult)   Pulse 90   Resp 16   Ht 5' 4\" (1.626 m)   Wt 126 lb (57.2 kg)   LMP  (LMP Unknown)   SpO2 98%   BMI 21.63 kg/m²       Wt Readings from Last 3 Encounters:   10/22/21 126 lb (57.2 kg)   10/09/21 126 lb 8.7 oz (57.4 kg)   08/11/21 133 lb (60.3 kg)            BP Readings from Last 3 Encounters:   10/22/21 106/62   10/09/21 111/78   08/11/21 100/68       PHYSICAL EXAM  General appearance: alert, cooperative, no distress, appears stated age  Neurologic: Alert and oriented X 3  Neck: supple, symmetrical, trachea midline, no adenopathy, no carotid bruit and no JVD  Lungs: clear to auscultation bilaterally  Heart: regular rate and rhythm, S1, S2 normal, no murmur, click, rub or gallop  Extremities: extremities normal, atraumatic, no cyanosis or edema    Lab Results   Component Value Date/Time    Cholesterol, total 204 (H) 02/16/2021 09:01 AM HDL Cholesterol 52 02/16/2021 09:01 AM    LDL, calculated 136.4 (H) 02/16/2021 09:01 AM    Triglyceride 78 02/16/2021 09:01 AM    CHOL/HDL Ratio 3.9 02/16/2021 09:01 AM     ASSESSMENT :      Given the nature of and time course of her symptoms I do not think there consistent with a cardiac the etiology in addition her blood work does not suggest that either. The ER doctor thought this might be some sort of viral syndrome and I think he might be right. The patient thinks it could be anxiety related but she is not sure. At this point I do not think she needs any specific cardiac testing though we did talk at length about symptoms that would prompt an urgent return visit here or a call to 911. current treatment plan is effective, no change in therapy  lab results and schedule of future lab studies reviewed with patient  reviewed diet, exercise and weight control    Encounter Diagnoses   Name Primary?  Other chest pain Yes     Orders Placed This Encounter    diazePAM (VALIUM) 2 mg tablet    pantoprazole sodium (PANTOPRAZOLE PO)       Follow-up and Dispositions    · Return in about 4 weeks (around 11/19/2021). Domo Argueta MD  10/22/2021  Please note that this dictation was completed with [employer] the computer voice recognition software. Quite often unanticipated grammatical, syntax, homophones, and other interpretive errors are inadvertently transcribed by the computer software. Please disregard these errors. Please excuse any errors that have escaped final proofreading. Thank you.

## 2022-02-21 ENCOUNTER — DOCUMENTATION ONLY (OUTPATIENT)
Dept: ORTHOPEDIC SURGERY | Age: 82
End: 2022-02-21

## 2022-02-23 ENCOUNTER — OFFICE VISIT (OUTPATIENT)
Dept: ORTHOPEDIC SURGERY | Age: 82
End: 2022-02-23
Payer: MEDICARE

## 2022-02-23 VITALS — BODY MASS INDEX: 23.92 KG/M2 | WEIGHT: 130 LBS | HEIGHT: 62 IN

## 2022-02-23 DIAGNOSIS — M84.48XA SACRAL INSUFFICIENCY FRACTURE, INITIAL ENCOUNTER: Primary | ICD-10-CM

## 2022-02-23 DIAGNOSIS — M53.3 COCCYGODYNIA: ICD-10-CM

## 2022-02-23 PROCEDURE — G8536 NO DOC ELDER MAL SCRN: HCPCS | Performed by: PHYSICIAN ASSISTANT

## 2022-02-23 PROCEDURE — G8427 DOCREV CUR MEDS BY ELIG CLIN: HCPCS | Performed by: PHYSICIAN ASSISTANT

## 2022-02-23 PROCEDURE — 1090F PRES/ABSN URINE INCON ASSESS: CPT | Performed by: PHYSICIAN ASSISTANT

## 2022-02-23 PROCEDURE — 1101F PT FALLS ASSESS-DOCD LE1/YR: CPT | Performed by: PHYSICIAN ASSISTANT

## 2022-02-23 PROCEDURE — 99213 OFFICE O/P EST LOW 20 MIN: CPT | Performed by: PHYSICIAN ASSISTANT

## 2022-02-23 PROCEDURE — G8432 DEP SCR NOT DOC, RNG: HCPCS | Performed by: PHYSICIAN ASSISTANT

## 2022-02-23 PROCEDURE — G8420 CALC BMI NORM PARAMETERS: HCPCS | Performed by: PHYSICIAN ASSISTANT

## 2022-02-23 NOTE — PROGRESS NOTES
1. Have you been to the ER, urgent care clinic since your last visit? Hospitalized since your last visit? No    2. Have you seen or consulted any other health care providers outside of the [address] [place] [address] since your last visit? Include any pap smears or colon screening.  No    Chief Complaint   Patient presents with    Pelvic Pain

## 2022-02-24 NOTE — PROGRESS NOTES
Erna Pope (: 1940) is a 80 y.o. female patient here for evaluation of the following chief complaint(s):  Pelvic Pain         ASSESSMENT/PLAN:  Below is the assessment and plan developed based on review of pertinent history, physical exam, labs, studies, and medications. 1. Sacral insufficiency fracture, initial encounter  -     XR PELV 1 OR 2 V; Future  -     MRI PELV WO CONT; Future  2. Coccygodynia  -     MRI PELV WO CONT; Future      The patient's radiologic findings have been reviewed with her in detail today. She has a 3-month history of constant pain and burning in the coccygeal region. She has a known history of osteoporosis and has had suspected sacral insufficiency fractures in the past.  Given her persistent pain despite use of tramadol and limitation of sitting and use of donut pillows, would like for her to obtain a MRI of the pelvis with STIR images to evaluate for any acute fracture. She will return for follow-up visit after her MRI is complete. She may be a candidate for injection therapy in the coccygeal region if there is no evidence of acute fracture. No follow-ups on file. SUBJECTIVE/OBJECTIVE:  Erna Pope (: 1940) is a 80 y.o. female who presents today for the following:  Chief Complaint   Patient presents with    Pelvic Pain        HPI   Ms. Eileen Liu today for a follow-up visit. She states that she began with significant coccygeal pain over the past 3 months. She denies any precipitating event or injury per se, but notes that her pain began suddenly when getting out of a chair. No falls. She states that her pain has been constant in nature and she experiences constant pain and burning. Her symptoms increase with sitting. She denies any leg pain, numbness, tingling, or weakness in her legs.   She has tried multiple pillows and limiting her sitting in order to relieve her pain, unfortunately this is provided her with no substantial relief of her symptoms. She denies any bowel or bladder changes. She has been using tramadol as prescribed per her PCP without any substantial relief. She has a known history of osteoporosis and has had history of sacral insufficiency fracture in the past.    IMAGING:  XR Results (most recent):  Results from Appointment encounter on 02/23/22    XR PELV 1 OR 2 V    Narrative  AP view of the pelvis reveals no evidence of acute fracture or lytic lesion within the pelvis. 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 3 D time of flight MRA of the head was performed. FINDINGS:    Ventricles: Midline, no hydrocephalus. Intracranial Hemorrhage: None. Brain Parenchyma/Brainstem: Mild diffuse atrophy. Mild chronic periventricular  T2 signal hyperintensity. Prominent extra-axial CSF in the anterior aspect of  the bilateral posterior fossa may represent arachnoid cyst, unchanged. No acute  infarction. Basal Cisterns: Normal.  Flow Voids: Normal.  Additional Comments: Noncontrast imaging of the orbits demonstrate symmetric  appearance of the extraocular muscles and optic nerve sheath complexes. No  superior ophthalmic vein enlargement. No evidence of suprasellar mass. Optic  chiasm is normal. Cannot assess for enhancement. Posterior Circulation: No flow limiting stenosis or occlusion. Anterior Circulation: No flow limiting stenosis or occlusion. Additional Comments: No evidence of aneurysm or vascular malformation. No  abnormal flow related signal in the cavernous sinuses or supraophthalmic veins. Impression  1.  Mild chronic white matter disease and atrophy, with no acute process. 2. No flow limiting stenosis or intracranial aneurysm. No MRA findings to  suggest carotid-cavernous fistula. No change since February 2021. Allergies   Allergen Reactions    Clindamycin Anaphylaxis    Ketek [Telithromycin] Anaphylaxis    Levaquin [Levofloxacin] Anaphylaxis    Nexium [Esomeprazole Magnesium] Anaphylaxis    Sudafed [Pseudoephedrine Hcl] Anaphylaxis    Voltaren [Diclofenac Sodium] Other (comments)     Mild throat closing and severe nausea    Xyzal [Levocetirizine] Anaphylaxis    Ceftin [Cefuroxime Axetil] Unknown (comments)    Cortisone Rash     Can take depro medrol if preservative free only    Methylprednisolone Rash     Facial rash    Mobic [Meloxicam] Other (comments)     Throat closing, severe nausea, abd pain and facial swelling    Vibramycin [Doxycycline Calcium] Rash     Facial rash    Afrin [Pseudoephedrine Sulfate] Other (comments)     Facial numbness that lasted 45 minutes during testing    Antihistamine-1 Anaphylaxis    Azithromycin Other (comments)     \"neck stiffness & face numbness & swelling\"    Bactrim [Sulfamethoprim Ds] Rash     Facial rash and swelling    Biaxin [Clarithromycin] Rash     Severe facial rash and swelling    Ciprofloxacin Other (comments)     \"numbness and tingling in foot & leg\"    Codeine Angioedema    Doxycycline Anaphylaxis    Flagyl [Metronidazole] Other (comments)     Reports on the 7th day of taking it \"I had SEVERE Cranial pain that was not a headache\"     Gabapentin Other (comments)    Pcn [Penicillins] Anaphylaxis    Restasis [Cyclosporine] Angioedema       Current Outpatient Medications   Medication Sig    triamcinolone acetonide (KENALOG) 0.1 % topical cream Apply  to affected area two (2) times daily as needed for Skin Irritation. use thin layer    diazePAM (VALIUM) 2 mg tablet     pantoprazole sodium (PANTOPRAZOLE PO) Take 40 mg by mouth daily.     propranoloL (INDERAL) 10 mg tablet Take 1 Tab by mouth four (4) times daily as needed for Anxiety.  aspirin delayed-release 81 mg tablet Take 1 Tab by mouth daily.  acetaminophen (TYLENOL) 325 mg tablet Take 325 mg by mouth every eight (8) hours.  therapeutic multivitamin (THERAGRAN) tablet Take 1 Tab by mouth daily.  CALCIUM CITRATE (CITRACAL PO) Take 1 Tab by mouth two (2) times a day.  polyethylene glycol (MIRALAX) 17 gram packet Take 17 g by mouth daily as needed.  docusate sodium (COLACE) 100 mg capsule Take 100 mg by mouth two (2) times daily as needed.  cholecalciferol, vitamin D3, 2,000 unit tab Take 1 Tab by mouth daily.  cyanocobalamin (VITAMIN B12) 500 mcg tablet Take 500 mcg by mouth daily.  Dexlansoprazole (DEXILANT) 60 mg CpDB Take 1 Cap by mouth daily.  loteprednol etabonate (LOTEMAX) 0.5 % ophthalmic suspension Administer 1 Drop to both eyes daily. No current facility-administered medications for this visit.        Past Medical History:   Diagnosis Date    Diverticulitis     Dystrophy, cornea     Environmental allergies     Family history of skin cancer     GERD (gastroesophageal reflux disease)     Hypotension     Ill-defined condition     Hyponatremia    Menopause     LMP-unknown    Multiple drug allergies     Osteoporosis     Dr. 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 Magana Slight       Family History   Problem Relation Age of Onset    Heart Disease Mother     Cancer Father 61        lung    Cataract Daughter         congenital cataracts    Heart Disease Daughter         fast heart rythmn        Social History     Tobacco Use    Smoking status: Never Smoker    Smokeless tobacco: Never Used   Substance Use Topics    Alcohol use: No        Review of Systems   Constitutional: Negative. Respiratory: Negative. Cardiovascular: Negative. Gastrointestinal: Negative. Endocrine: Negative. Genitourinary: Negative. Musculoskeletal: Positive for back pain. Skin: Negative. Allergic/Immunologic: Negative. Neurological: Negative for weakness and numbness. Hematological: Negative. Psychiatric/Behavioral: Negative. All other systems reviewed and are negative. No flowsheet data found. Vitals:  Ht 5' 2\" (1.575 m)   Wt 130 lb (59 kg)   LMP  (LMP Unknown)   BMI 23.78 kg/m²    Body mass index is 23.78 kg/m². Physical Exam    Neurologic  Sensory  Light Touch - Intact - Globally. Overall Assessment of Muscle Strength and Tone reveals  Lower Extremities - Right Iliopsoas - 5/5. Left Iliopsoas - 5/5. Right Tibialis Anterior - 5/5. Left Tibialis Anterior - 5/5. Right Gastroc-Soleus - 5/5. Left Gastroc-Soleus - 5/5. Right EHL - 5/5. Left EHL - 5/5. General Assessment of Reflexes  Right Ankle - Clonus is not present. Left Ankle - Clonus is not present. Reflexes (Dermatomes)  2/2 Normal - Left Achilles (L5-S2), Left Knee (L2-4), Right Achilles (L5-S2) and Right Knee (L2-4). Musculoskeletal  Global Assessment  Examination of related systems reveals - well-developed, well-nourished, in no acute distress, alert and oriented x 3. Gait and Station - normal gait and station and normal posture. Right Lower Extremity - normal strength and tone, normal range of motion without pain and no instability, subluxation or laxity. Left Lower Extremity - normal strength and tone, normal range of motion without pain and no instability, subluxation or laxity.   Spine/Ribs/Pelvis  Cervical Spine - Examination of the cervical spine reveals - no tenderness to palpation, no pain, no swelling, edema or erythema, normal cervical spine movements and normal sensation. Thoracic (Dorsal) Spine - Examination of the thoracic spine reveals - no tenderness over thoracic vertebrae, no pain, normal sensation and normal thoracic spine movements. Lumbosacral Spine - Examination of the lumbosacral spine reveals - no known fractures or deformities. Inspection and Palpation - Tenderness - moderate. Assessment of pain reveals the following findings - The pain is characterized as - moderate. Location - pain refers to lower back bilaterally. ROJM - Trunk Extension - 15 degrees. Lumbar Spine Flexion - 35 °. Lumbosacral Spine - Functional Testing - Babinski Test negative, Prone Knee Bending Test negative, Slump Test negative, Straight Leg Raising Test negative. There is moderate tenderness to palpation over the coccygeal region. Dr. Kathie Ashford was available for immediate consult during this encounter. An electronic signature was used to authenticate this note.   -- DION Guerrero

## 2022-03-14 PROBLEM — G45.9 TIA (TRANSIENT ISCHEMIC ATTACK): Status: RESOLVED | Noted: 2021-02-16 | Resolved: 2022-03-14

## 2022-03-18 PROBLEM — R94.31 ABNORMAL EKG: Status: ACTIVE | Noted: 2021-10-22

## 2022-03-19 PROBLEM — B02.9 HERPES ZOSTER WITHOUT COMPLICATION: Status: ACTIVE | Noted: 2019-05-15

## 2022-03-19 PROBLEM — E87.1 HYPONATREMIA: Status: ACTIVE | Noted: 2017-05-16

## 2022-03-20 PROBLEM — R52 INTRACTABLE PAIN: Status: ACTIVE | Noted: 2019-05-15

## 2022-03-20 PROBLEM — R20.0 NUMBNESS: Status: ACTIVE | Noted: 2020-05-05

## 2022-03-22 ENCOUNTER — OFFICE VISIT (OUTPATIENT)
Dept: ORTHOPEDIC SURGERY | Age: 82
End: 2022-03-22

## 2022-03-22 DIAGNOSIS — M67.911 TENDINOPATHY OF ROTATOR CUFF, RIGHT: ICD-10-CM

## 2022-03-22 DIAGNOSIS — M19.011 PRIMARY OSTEOARTHRITIS OF RIGHT SHOULDER: Primary | ICD-10-CM

## 2022-03-22 DIAGNOSIS — R20.0 NUMBNESS: ICD-10-CM

## 2022-03-22 PROCEDURE — 1101F PT FALLS ASSESS-DOCD LE1/YR: CPT | Performed by: ORTHOPAEDIC SURGERY

## 2022-03-22 PROCEDURE — G8432 DEP SCR NOT DOC, RNG: HCPCS | Performed by: ORTHOPAEDIC SURGERY

## 2022-03-22 PROCEDURE — 1090F PRES/ABSN URINE INCON ASSESS: CPT | Performed by: ORTHOPAEDIC SURGERY

## 2022-03-22 PROCEDURE — G8427 DOCREV CUR MEDS BY ELIG CLIN: HCPCS | Performed by: ORTHOPAEDIC SURGERY

## 2022-03-22 PROCEDURE — G8420 CALC BMI NORM PARAMETERS: HCPCS | Performed by: ORTHOPAEDIC SURGERY

## 2022-03-22 PROCEDURE — 99214 OFFICE O/P EST MOD 30 MIN: CPT | Performed by: ORTHOPAEDIC SURGERY

## 2022-03-22 PROCEDURE — G8536 NO DOC ELDER MAL SCRN: HCPCS | Performed by: ORTHOPAEDIC SURGERY

## 2022-03-22 NOTE — PROGRESS NOTES
Harry Negro (: 1940) is a 80 y.o. female, patient, here for evaluation of the following chief complaint(s):  Shoulder Pain (bilateral shoulder pain)       HPI:    Patient presents to the office today with a chief plaint of right shoulder pain. This is a patient I seen before in the past.  She believes I saw her just a couple of months ago. In fact, I saw her several months prior. She has had a prior MRI evaluation in 2018 in regards to right shoulder pain. She is carried on and off shoulder pain seems to be getting a little bit worse. She has nighttime pain and pain with reaching up overhead denies neck pain. However, she also describes somewhat of a numbness and tingling in a weakness in her hand. Of note, she is described this before in the past to me. She also has had prior work-ups in regards to this.     Allergies   Allergen Reactions    Clindamycin Anaphylaxis    Ketek [Telithromycin] Anaphylaxis    Levaquin [Levofloxacin] Anaphylaxis    Nexium [Esomeprazole Magnesium] Anaphylaxis    Sudafed [Pseudoephedrine Hcl] Anaphylaxis    Voltaren [Diclofenac Sodium] Other (comments)     Mild throat closing and severe nausea    Xyzal [Levocetirizine] Anaphylaxis    Ceftin [Cefuroxime Axetil] Unknown (comments)    Cortisone Rash     Can take depro medrol if preservative free only    Methylprednisolone Rash     Facial rash    Mobic [Meloxicam] Other (comments)     Throat closing, severe nausea, abd pain and facial swelling    Vibramycin [Doxycycline Calcium] Rash     Facial rash    Afrin [Pseudoephedrine Sulfate] Other (comments)     Facial numbness that lasted 45 minutes during testing    Antihistamine-1 Anaphylaxis    Azithromycin Other (comments)     \"neck stiffness & face numbness & swelling\"    Bactrim [Sulfamethoprim Ds] Rash     Facial rash and swelling    Biaxin [Clarithromycin] Rash     Severe facial rash and swelling    Ciprofloxacin Other (comments)     \"numbness and tingling in foot & leg\"    Codeine Angioedema    Doxycycline Anaphylaxis    Flagyl [Metronidazole] Other (comments)     Reports on the 7th day of taking it \"I had SEVERE Cranial pain that was not a headache\"     Gabapentin Other (comments)    Pcn [Penicillins] Anaphylaxis    Restasis [Cyclosporine] Angioedema       Current Outpatient Medications   Medication Sig    traMADoL (ULTRAM) 50 mg tablet TAKE 1 TO 2 TABLETS BY MOUTH THREE TIMES DAILY AS NEEDED FOR PAIN    triamcinolone acetonide (KENALOG) 0.1 % topical cream Apply  to affected area two (2) times daily as needed for Skin Irritation. use thin layer    pantoprazole sodium (PANTOPRAZOLE PO) Take 40 mg by mouth daily.  propranoloL (INDERAL) 10 mg tablet Take 1 Tab by mouth four (4) times daily as needed for Anxiety.  acetaminophen (TYLENOL) 325 mg tablet Take 325 mg by mouth every eight (8) hours.  therapeutic multivitamin (THERAGRAN) tablet Take 1 Tab by mouth daily.  CALCIUM CITRATE (CITRACAL PO) Take 1 Tab by mouth two (2) times a day.  polyethylene glycol (MIRALAX) 17 gram packet Take 17 g by mouth daily as needed.  docusate sodium (COLACE) 100 mg capsule Take 100 mg by mouth two (2) times daily as needed.  cholecalciferol, vitamin D3, 2,000 unit tab Take 1 Tab by mouth daily.  loteprednol etabonate (LOTEMAX) 0.5 % ophthalmic suspension Administer 1 Drop to both eyes daily. No current facility-administered medications for this visit.        Past Medical History:   Diagnosis Date    Diverticulitis     Dystrophy, cornea     Environmental allergies     Family history of skin cancer     GERD (gastroesophageal reflux disease)     Hypotension     Ill-defined condition     Hyponatremia    Menopause     LMP-unknown    Multiple drug allergies     Osteoporosis     Dr. Jerad Velazquez at [address] 05/2019    Sun-damaged skin     Vertebral compression fracture ([facility] [address] 01/05/12    Vocal cord anomaly     vocal cord dysfunction per speech therapist        Past Surgical History:   Procedure Laterality Date    COLONOSCOPY N/A 7/7/2017    COL[facility] Jeff Collins [facility] at [facility] ENDOSCOPY    HC BLD [facility] 2018 [facility] HX CATARACT REMOVAL  07/2012    HX GYN      D&C    HX MOHS PROCEDURES  04/11/2017    [facility] left cheek by Dr. Sangeeta Steele History   Problem Relation Age of Onset    Heart Disease Mother     Cancer Father 61        lung    Cataract Daughter         congenital cataracts    Heart Disease Daughter         fast heart rythmn        Social History     Socioeconomic History    Marital status:      Spouse name: Not on file    Number of children: Not on file    Years of education: Not on file    Highest education level: Not on file   Occupational History    Not on file   Tobacco Use    Smoking status: Never Smoker    Smokeless tobacco: Never Used   Substance and Sexual Activity    Alcohol use: No    Drug use: No    Sexual activity: Not on file   Other Topics Concern    Not on file   Social History Narrative    Not on file     Social Determinants of Health     Financial Resource Strain:     Difficulty of Paying Living Expenses: Not on file   Food Insecurity:     Worried About [facility] in the Last Year: Not on file    Wally of Food in the Last Year: Not on file   Transportation Needs:     Lack of Transportation (Medical): Not on file    Lack of Transportation (Non-Medical):  Not on file   Physical Activity:     Days of Exercise per Week: Not on file    Minutes of Exercise per Session: Not on file   Stress:     Feeling of Stress : Not on file   Social Connections:     Frequency of Communication with Friends and Family: Not on file    Frequency of Social Gatherings with Friends and Family: Not on file    Attends [employer] Services: Not on file    Active Member of Clubs or Organizations: Not on file    Attends Club or Organization Meetings: Not on file    Marital Status: Not on file   Intimate Partner Violence:     Fear of Current or Ex-Partner: Not on file    Emotionally Abused: Not on file    Physically Abused: Not on file    Sexually Abused: Not on file   Housing Stability:     Unable to Pay for Housing in the Last Year: Not on file    Number of [place] in the Last Year: Not on file    Unstable Housing in the Last Year: Not on file       Review of Systems   Musculoskeletal:        Bilateral shoulder pain       Vitals:  LMP  (LMP Unknown)    There is no height or weight on file to calculate BMI. Ortho Exam     Patient is alert and oriented x3. Patient is in no acute distress. Patient ambulates with a nonantalgic gait. Right shoulder: Age-related shoulder girdle atrophy. There is no soft tissue swelling, ecchymosis, abrasions or lacerations. Active range of motion of the shoulder is limited to 130° of forward flexion, 120° of lateral abduction and 70° of external rotation. Internal rotation is to the SI joint and is painful. Passive range of motion is full with a painful impingement sign and painful Castillo sign. Rotator cuff strength is painful to evaluate and is at 4+/5 with forward flexion and lateral abduction. External rotational strength is maintained. There is no crepitation about the joint. Palpation of the [facility] joint does not reproduce discomfort and there is no pain elicited with cross-body adduction. Strength of the extremity is 5/5 strength at biceps/triceps/wrist extension and is comparable to the contralateral side. Neurovascular examination is intact. Patient moves digits distally with normal strength. Left Shoulder: Age-related shoulder girdle atrophy. There is no soft tissue swelling, ecchymosis, abrasions or lacerations. Active range of motion is full with forward flexion, lateral abduction and external rotation. Internal rotation is to the upper lumbar level with a negative lift-off sign.   Passive range of motion is full with a negative impingement sign and a negative Castillo sign. Rotator cuff strength with forward flexion, lateral abduction and external rotation is intact with 5/5 strength. There is no crepitation about the joint. Palpation of the [facility] joint does not reproduce discomfort, and there is no pain elicited with cross-body adduction. Neurovascular examination intact    I reviewed the patient's prior MRI. She has evidence of rotator cuff tendinopathy  ASSESSMENT/PLAN:    Patient returns to the office with recurrent discomfort of the right shoulder. This has been a problem before in the past.  The biggest problem is she has allergies to all steroid medicine. She states the allergy is to the filler that is noted in the product. Unfortunately, all of the products here in our office triamcinolone and Depo-Medrol have some agent for storing. Therefore, I do not feel confident or comfortable consulting her or encouraging  her in her to consider cortisone injection. She understands this. Apparently she had an epidural injection performed. At a pain specialist location and had Depo-Medrol with no reaction. Perhaps they have a more readily available Depo-Medrol that may be appropriate for her injection in her shoulder. I am happy to make the referral.  I do not believe surgery is a good idea in this situation.   She has had physical therapy before in the past.  I will make a referral onto one of the pain specialist to see if we can consider an        Sushil Magallon MD

## 2022-03-22 NOTE — LETTER
3/22/2022    Patient: Celeste Casper   YOB: 1940   Date of Visit: 3/22/2022     [address]  [address]    Dear Rosalind Nuñez MD,      Thank you for referring Ms. Kevan Lim to [facility] for evaluation. My notes for this consultation are attached. If you have questions, please do not hesitate to call me. I look forward to following your patient along with you.       Sincerely,    Sophie Singletary MD

## 2022-03-23 ENCOUNTER — TELEPHONE (OUTPATIENT)
Dept: ORTHOPEDIC SURGERY | Age: 82
End: 2022-03-23

## 2022-03-23 DIAGNOSIS — M53.3 COCCYGODYNIA: Primary | ICD-10-CM

## 2022-03-23 NOTE — TELEPHONE ENCOUNTER
The last office visit is below. Lizett Low has an apt for her results of the MRI on 04/13/2022. She is hopeful if you have time to call with results. The patient's radiologic findings have been reviewed with her in detail today. She has a 3-month history of constant pain and burning in the coccygeal region. She has a known history of osteoporosis and has had suspected sacral insufficiency fractures in the past.  Given her persistent pain despite use of tramadol and limitation of sitting and use of donut pillows, would like for her to obtain a MRI of the pelvis with STIR images to evaluate for any acute fracture. She will return for follow-up visit after her MRI is complete. She may be a candidate for injection therapy in the coccygeal region if there is no evidence of acute fracture.

## 2022-03-24 NOTE — TELEPHONE ENCOUNTER
Spoke with patient regarding recent MRI of the pelvis. MRI is within normal limits. We have discussed treatment options, and she would like to be referred for a coccygeal injection. She will see us back on an as-needed basis. She is an established patient with [facility].

## 2022-04-05 ENCOUNTER — TRANSCRIBE ORDER (OUTPATIENT)
Dept: SCHEDULING | Age: 82
End: 2022-04-05

## 2022-04-05 DIAGNOSIS — M81.0 SENILE OSTEOPOROSIS: Primary | ICD-10-CM

## 2022-04-07 ENCOUNTER — HOSPITAL ENCOUNTER (OUTPATIENT)
Dept: NUCLEAR MEDICINE | Age: 82
Discharge: HOME | End: 2022-04-07
Attending: SPECIALIST
Payer: MEDICARE

## 2022-04-07 DIAGNOSIS — M81.0 SENILE OSTEOPOROSIS: ICD-10-CM

## 2022-04-07 PROCEDURE — 78306 BONE IMAGING WHOLE BODY: CPT

## 2022-04-15 ENCOUNTER — TELEPHONE (OUTPATIENT)
Dept: ORTHOPEDIC SURGERY | Age: 82
End: 2022-04-15

## 2022-04-15 NOTE — TELEPHONE ENCOUNTER
Phone call from patient that she needs a preservative free depo medrol. Dr Herberth King and [facility] use this but they are not able to inject patients shoulder at this time. Also Ana with [facility] has mentioned there is a [place] shortage of the medication. I have called the patient to inform her and she will wait to see when and how we get this ordered.  mtj

## 2022-05-03 ENCOUNTER — OFFICE VISIT (OUTPATIENT)
Dept: ORTHOPEDIC SURGERY | Age: 82
End: 2022-05-03
Payer: MEDICARE

## 2022-05-03 DIAGNOSIS — M75.121 NONTRAUMATIC COMPLETE TEAR OF RIGHT ROTATOR CUFF: Primary | ICD-10-CM

## 2022-05-03 PROCEDURE — 1101F PT FALLS ASSESS-DOCD LE1/YR: CPT | Performed by: ORTHOPAEDIC SURGERY

## 2022-05-03 PROCEDURE — 99213 OFFICE O/P EST LOW 20 MIN: CPT | Performed by: ORTHOPAEDIC SURGERY

## 2022-05-03 PROCEDURE — G8427 DOCREV CUR MEDS BY ELIG CLIN: HCPCS | Performed by: ORTHOPAEDIC SURGERY

## 2022-05-03 PROCEDURE — G8536 NO DOC ELDER MAL SCRN: HCPCS | Performed by: ORTHOPAEDIC SURGERY

## 2022-05-03 PROCEDURE — G8432 DEP SCR NOT DOC, RNG: HCPCS | Performed by: ORTHOPAEDIC SURGERY

## 2022-05-03 PROCEDURE — G8420 CALC BMI NORM PARAMETERS: HCPCS | Performed by: ORTHOPAEDIC SURGERY

## 2022-05-03 PROCEDURE — 20610 DRAIN/INJ JOINT/BURSA W/O US: CPT | Performed by: ORTHOPAEDIC SURGERY

## 2022-05-03 PROCEDURE — 1090F PRES/ABSN URINE INCON ASSESS: CPT | Performed by: ORTHOPAEDIC SURGERY

## 2022-05-03 NOTE — LETTER
5/4/2022    Patient: Sophia Adjutant   YOB: 1940   Date of Visit: 5/3/2022     [address]    Dear Beronica Lebron MD,      Thank you for referring Ms. Bella Javed to [facility] for evaluation. My notes for this consultation are attached. If you have questions, please do not hesitate to call me. I look forward to following your patient along with you.       Sincerely,    Bret Hunt MD

## 2022-05-03 NOTE — PROGRESS NOTES
Anju Rosa (: 1940) is a 80 y.o. female, patient, here for evaluation of the following chief complaint(s):  Shoulder Pain (bilateral shoulder pain)       HPI:    Patient returns to the office with recurrent discomfort of the right shoulder. She is here today with her daughter. The last time I saw her, we wanted to proceed with a cortisone injection. She told us that she was allergic to the filler that is injected into the Depo-Medrol. She is here today with her own Depo-Medrol. It is contained in a vial.  She  receive this from her pain management doctor.   Apparently her pain management doctor orders Depo-Medrol with no preservatives and this is what she would prefer    Allergies   Allergen Reactions    Clindamycin Anaphylaxis    Ketek [Telithromycin] Anaphylaxis    Levaquin [Levofloxacin] Anaphylaxis    Nexium [Esomeprazole Magnesium] Anaphylaxis    Sudafed [Pseudoephedrine Hcl] Anaphylaxis    Voltaren [Diclofenac Sodium] Other (comments)     Mild throat closing and severe nausea    Xyzal [Levocetirizine] Anaphylaxis    Ceftin [Cefuroxime Axetil] Unknown (comments)    Cortisone Rash     Can take depro medrol if preservative free only    Methylprednisolone Rash     Facial rash    Mobic [Meloxicam] Other (comments)     Throat closing, severe nausea, abd pain and facial swelling    Vibramycin [Doxycycline Calcium] Rash     Facial rash    Afrin [Pseudoephedrine Sulfate] Other (comments)     Facial numbness that lasted 45 minutes during testing    Antihistamine-1 Anaphylaxis    Azithromycin Other (comments)     \"neck stiffness & face numbness & swelling\"    Bactrim [Sulfamethoprim Ds] Rash     Facial rash and swelling    Biaxin [Clarithromycin] Rash     Severe facial rash and swelling    Ciprofloxacin Other (comments)     \"numbness and tingling in foot & leg\"    Codeine Angioedema    Doxycycline Anaphylaxis    Flagyl [Metronidazole] Other (comments)     Reports on the 7th day of taking it \"I had SEVERE Cranial pain that was not a headache\"     Gabapentin Other (comments)    Pcn [Penicillins] Anaphylaxis    Restasis [Cyclosporine] Angioedema       Current Outpatient Medications   Medication Sig    traMADoL (ULTRAM) 50 mg tablet TAKE 1 TO 2 TABLETS BY MOUTH THREE TIMES DAILY AS NEEDED FOR PAIN    triamcinolone acetonide (KENALOG) 0.1 % topical cream Apply  to affected area two (2) times daily as needed for Skin Irritation. use thin layer    pantoprazole sodium (PANTOPRAZOLE PO) Take 40 mg by mouth daily.  propranoloL (INDERAL) 10 mg tablet Take 1 Tab by mouth four (4) times daily as needed for Anxiety.  acetaminophen (TYLENOL) 325 mg tablet Take 325 mg by mouth every eight (8) hours.  therapeutic multivitamin (THERAGRAN) tablet Take 1 Tab by mouth daily.  CALCIUM CITRATE (CITRACAL PO) Take 1 Tab by mouth two (2) times a day.  polyethylene glycol (MIRALAX) 17 gram packet Take 17 g by mouth daily as needed.  docusate sodium (COLACE) 100 mg capsule Take 100 mg by mouth two (2) times daily as needed.  cholecalciferol, vitamin D3, 2,000 unit tab Take 1 Tab by mouth daily.  loteprednol etabonate (LOTEMAX) 0.5 % ophthalmic suspension Administer 1 Drop to both eyes daily. No current facility-administered medications for this visit.        Past Medical History:   Diagnosis Date    Diverticulitis     Dystrophy, cornea     Environmental allergies     Family history of skin cancer     GERD (gastroesophageal reflux disease)     Hypotension     Ill-defined condition     Hyponatremia    Menopause     LMP-unknown    Multiple drug allergies     Osteoporosis     Dr. 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 Loredo Plum History   Problem Relation Age of Onset    Heart Disease Mother     Cancer Father 61        lung    Cataract Daughter         congenital cataracts    Heart Disease Daughter         fast heart rythmn        Social History     Socioeconomic History    Marital status:      Spouse name: Not on file    Number of children: Not on file    Years of education: Not on file    Highest education level: Not on file   Occupational History    Not on file   Tobacco Use    Smoking status: Never Smoker    Smokeless tobacco: Never Used   Substance and Sexual Activity    Alcohol use: No    Drug use: No    Sexual activity: Not on file   Other Topics Concern    Not on file   Social History Narrative    Not on file     Social Determinants of Health     Financial Resource Strain:     Difficulty of Paying Living Expenses: Not on file   Food Insecurity:     Worried About [facility] in the Last Year: Not on file    Wally of Food in the Last Year: Not on file   Transportation Needs:     Lack of Transportation (Medical): Not on file    Lack of Transportation (Non-Medical):  Not on file   Physical Activity:     Days of Exercise per Week: Not on file    Minutes of Exercise per Session: Not on file   Stress:     Feeling of Stress : Not on file   Social Connections:     Frequency of Communication with Friends and Family: Not on file    Frequency of Social Gatherings with Friends and Family: Not on file    Attends Alevism Services: Not on file    Active Member of Clubs or Organizations: Not on file    Attends Club or Organization Meetings: Not on file    Marital Status: Not on file   Intimate Partner Violence:     Fear of Current or Ex-Partner: Not on file    Emotionally Abused: Not on file   Ashley Rodriguez Physically Abused: Not on file    Sexually Abused: Not on file   Housing Stability:     Unable to Pay for Housing in the Last Year: Not on file    Number of Places Lived in the Last Year: Not on file    Unstable Housing in the Last Year: Not on file       Review of Systems   Musculoskeletal:        Bilateral knee pain       Vitals:  LMP  (LMP Unknown)    There is no height or weight on file to calculate BMI. Ortho Exam     Right shoulder: Age-related shoulder girdle atrophy. There is no soft tissue swelling, ecchymosis, abrasions or lacerations. Active range of motion of the shoulder is limited to 130° of forward flexion, 120° of lateral abduction and 70° of external rotation. Internal rotation is to the SI joint and is painful. Passive range of motion is full with a painful impingement sign and painful Castillo sign. Rotator cuff strength is painful to evaluate and is at 4+/5 with forward flexion and lateral abduction. External rotational strength is maintained. There is no crepitation about the joint. Palpation of the [facility] joint does not reproduce discomfort and there is no pain elicited with cross-body adduction. Strength of the extremity is 5/5 strength at biceps/triceps/wrist extension and is comparable to the contralateral side. Neurovascular examination is intact. Patient moves digits distally with normal strength.        Left Shoulder: Age-related shoulder girdle atrophy. There is no soft tissue swelling, ecchymosis, abrasions or lacerations. Active range of motion is full with forward flexion, lateral abduction and external rotation. Internal rotation is to the upper lumbar level with a negative lift-off sign. Passive range of motion is full with a negative impingement sign and a negative Castillo sign. Rotator cuff strength with forward flexion, lateral abduction and external rotation is intact with 5/5 strength. There is no crepitation about the joint.   Palpation of the [facility] joint does not reproduce discomfort, and there is no pain elicited with cross-body adduction. Neurovascular examination intact     I reviewed the patient's prior MRI. She has evidence of rotator cuff tendinopathy      ASSESSMENT/PLAN:    I have inspected the bile. This is a file that is very similar to the ones that we have obtained from the same company. It is protected and is legitimate. She would like to proceed with a cortisone injection right shoulder. I think this is appropriate. We did talk about the pivot cane. She has had the pain became before in the past with no problems. After consent of the right shoulder injection, the shoulder was sterilely prepped and draped. An injection of 80 mg of Depo-Medrol and 5 cc of 0.25% plain bupivacaine was performed in the subacromial joint. Patient tolerated the injection well. She had no side effects. A Band-Aid was placed. Have asked her to ice her shoulder on and off for the next 48 hours as it most likely will get sore. If she has no improvement with the injection, I am happy to see her back.         Dav Schwartz MD

## 2022-05-17 ENCOUNTER — HOSPITAL ENCOUNTER (EMERGENCY)
Age: 82
Discharge: HOME | End: 2022-05-17
Attending: STUDENT IN AN ORGANIZED HEALTH CARE EDUCATION/TRAINING PROGRAM
Payer: MEDICARE

## 2022-05-17 ENCOUNTER — APPOINTMENT (OUTPATIENT)
Dept: GENERAL RADIOLOGY | Age: 82
End: 2022-05-17
Attending: STUDENT IN AN ORGANIZED HEALTH CARE EDUCATION/TRAINING PROGRAM
Payer: MEDICARE

## 2022-05-17 VITALS
SYSTOLIC BLOOD PRESSURE: 126 MMHG | HEIGHT: 62 IN | BODY MASS INDEX: 23.92 KG/M2 | TEMPERATURE: 98.3 F | OXYGEN SATURATION: 98 % | RESPIRATION RATE: 18 BRPM | WEIGHT: 130 LBS | HEART RATE: 83 BPM | DIASTOLIC BLOOD PRESSURE: 74 MMHG

## 2022-05-17 DIAGNOSIS — T78.40XA ALLERGIC REACTION, INITIAL ENCOUNTER: Primary | ICD-10-CM

## 2022-05-17 LAB
ALBUMIN SERPL-MCNC: 4.1 G/DL (ref 3.5–5)
ALBUMIN/GLOB SERPL: 1 {RATIO} (ref 1.1–2.2)
ALP SERPL-CCNC: 64 U/L (ref 45–117)
ALT SERPL-CCNC: 18 U/L (ref 12–78)
ANION GAP SERPL CALC-SCNC: 5 MMOL/L (ref 5–15)
AST SERPL-CCNC: 15 U/L (ref 15–37)
ATRIAL RATE: 70 BPM
BASOPHILS # BLD: 0.1 K/UL (ref 0–0.1)
BASOPHILS NFR BLD: 1 % (ref 0–1)
BILIRUB SERPL-MCNC: 0.6 MG/DL (ref 0.2–1)
BUN SERPL-MCNC: 14 MG/DL (ref 6–20)
BUN/CREAT SERPL: 18 (ref 12–20)
CALCIUM SERPL-MCNC: 9.7 MG/DL (ref 8.5–10.1)
CALCULATED P AXIS, ECG09: 49 DEGREES
CALCULATED R AXIS, ECG10: 97 DEGREES
CALCULATED T AXIS, ECG11: 62 DEGREES
CHLORIDE SERPL-SCNC: 98 MMOL/L (ref 97–108)
CO2 SERPL-SCNC: 29 MMOL/L (ref 21–32)
COMMENT, HOLDF: NORMAL
CREAT SERPL-MCNC: 0.76 MG/DL (ref 0.55–1.02)
DIAGNOSIS, 93000: NORMAL
DIFFERENTIAL METHOD BLD: ABNORMAL
EOSINOPHIL # BLD: 0.5 K/UL (ref 0–0.4)
EOSINOPHIL NFR BLD: 10 % (ref 0–7)
ERYTHROCYTE [DISTWIDTH] IN BLOOD BY AUTOMATED COUNT: 13.9 % (ref 11.5–14.5)
GLOBULIN SER CALC-MCNC: 4.2 G/DL (ref 2–4)
GLUCOSE SERPL-MCNC: 84 MG/DL (ref 65–100)
HCT VFR BLD AUTO: 42.5 % (ref 35–47)
HGB BLD-MCNC: 13.6 G/DL (ref 11.5–16)
IMM GRANULOCYTES # BLD AUTO: 0 K/UL (ref 0–0.04)
IMM GRANULOCYTES NFR BLD AUTO: 0 % (ref 0–0.5)
LYMPHOCYTES # BLD: 1.2 K/UL (ref 0.8–3.5)
LYMPHOCYTES NFR BLD: 24 % (ref 12–49)
MCH RBC QN AUTO: 28.9 PG (ref 26–34)
MCHC RBC AUTO-ENTMCNC: 32 G/DL (ref 30–36.5)
MCV RBC AUTO: 90.4 FL (ref 80–99)
MONOCYTES # BLD: 0.4 K/UL (ref 0–1)
MONOCYTES NFR BLD: 9 % (ref 5–13)
NEUTS SEG # BLD: 2.7 K/UL (ref 1.8–8)
NEUTS SEG NFR BLD: 56 % (ref 32–75)
NRBC # BLD: 0 K/UL (ref 0–0.01)
NRBC BLD-RTO: 0 PER 100 WBC
P-R INTERVAL, ECG05: 134 MS
PLATELET # BLD AUTO: 276 K/UL (ref 150–400)
PMV BLD AUTO: 9.7 FL (ref 8.9–12.9)
POTASSIUM SERPL-SCNC: 3.8 MMOL/L (ref 3.5–5.1)
PROT SERPL-MCNC: 8.3 G/DL (ref 6.4–8.2)
Q-T INTERVAL, ECG07: 426 MS
QRS DURATION, ECG06: 126 MS
QTC CALCULATION (BEZET), ECG08: 460 MS
RBC # BLD AUTO: 4.7 M/UL (ref 3.8–5.2)
SAMPLES BEING HELD,HOLD: NORMAL
SODIUM SERPL-SCNC: 132 MMOL/L (ref 136–145)
TROPONIN-HIGH SENSITIVITY: 4 NG/L (ref 0–51)
VENTRICULAR RATE, ECG03: 70 BPM
WBC # BLD AUTO: 5 K/UL (ref 3.6–11)

## 2022-05-17 PROCEDURE — 80053 COMPREHEN METABOLIC PANEL: CPT

## 2022-05-17 PROCEDURE — 74011250636 HC RX REV CODE- 250/636: Performed by: STUDENT IN AN ORGANIZED HEALTH CARE EDUCATION/TRAINING PROGRAM

## 2022-05-17 PROCEDURE — 96375 TX/PRO/DX INJ NEW DRUG ADDON: CPT

## 2022-05-17 PROCEDURE — 93005 ELECTROCARDIOGRAM TRACING: CPT

## 2022-05-17 PROCEDURE — 71046 X-RAY EXAM CHEST 2 VIEWS: CPT

## 2022-05-17 PROCEDURE — 99285 EMERGENCY DEPT VISIT HI MDM: CPT

## 2022-05-17 PROCEDURE — 96374 THER/PROPH/DIAG INJ IV PUSH: CPT

## 2022-05-17 PROCEDURE — 74011000250 HC RX REV CODE- 250: Performed by: STUDENT IN AN ORGANIZED HEALTH CARE EDUCATION/TRAINING PROGRAM

## 2022-05-17 PROCEDURE — 85025 COMPLETE CBC W/AUTO DIFF WBC: CPT

## 2022-05-17 PROCEDURE — 84484 ASSAY OF TROPONIN QUANT: CPT

## 2022-05-17 RX ORDER — DIPHENHYDRAMINE HYDROCHLORIDE 50 MG/ML
25 INJECTION, SOLUTION INTRAMUSCULAR; INTRAVENOUS ONCE
Status: COMPLETED | OUTPATIENT
Start: 2022-05-17 | End: 2022-05-17

## 2022-05-17 RX ORDER — EPINEPHRINE 0.3 MG/.3ML
0.3 INJECTION SUBCUTANEOUS
Qty: 1 EACH | Refills: 0 | Status: SHIPPED | OUTPATIENT
Start: 2022-05-17 | End: 2022-05-17

## 2022-05-17 RX ORDER — METHYLPREDNISOLONE 4 MG/1
TABLET ORAL
Qty: 1 DOSE PACK | Refills: 0 | OUTPATIENT
Start: 2022-05-17 | End: 2022-05-20

## 2022-05-17 RX ADMIN — METHYLPREDNISOLONE SODIUM SUCCINATE 125 MG: 125 INJECTION, POWDER, FOR SOLUTION INTRAMUSCULAR; INTRAVENOUS at 09:27

## 2022-05-17 RX ADMIN — DIPHENHYDRAMINE HYDROCHLORIDE 25 MG: 50 INJECTION INTRAMUSCULAR; INTRAVENOUS at 09:27

## 2022-05-17 RX ADMIN — FAMOTIDINE 20 MG: 10 INJECTION INTRAVENOUS at 09:26

## 2022-05-17 NOTE — ED PROVIDER NOTES
HPI     Patient is a 6year-old female with history of allergic reaction who presents today for difficulty swallowing and facial swelling. Patient says that she felt like her throat started get tight yesterday and shortness of breath. She says that she has multiple allergies. She said that she recently got a steroid shot in her back several days ago. She feels that it is related to an allergic reaction. She has been taken Benadryl every 4-6 hours with some mild improvement. She says that her throat tightening has now resolved. She also admits to some intermittent numbness in her legs, which she says sometimes happens.   She presented to the ED for further evaluation    Past Medical History:   Diagnosis Date    Diverticulitis     Dystrophy, cornea     Environmental allergies     Family history of skin cancer     GERD (gastroesophageal reflux disease)     Hypotension     Ill-defined condition     Hyponatremia    Menopause     LMP-unknown    Multiple drug allergies     Osteoporosis     Dr. Clemente Plan at [address] 05/2019    Sun-damaged skin     Vertebral compression fracture ([address] 01/05/12    Vocal cord anomaly     vocal cord dysfunction per speech therapist       Past Surgical History:   Procedure Laterality Date    COLONOSCOPY N/A 7/7/2017    COL[facility]ileen [facility] at [facility]COPY     [facility] HX CATARACT REMOVAL  07/2012    HX GYN      D&C    HX MOHS PROCEDURES  04/11/2017    [facility] left cheek by Dr. Michaela Patel         Family History:   Problem Relation Age of Onset    Heart Disease Mother     Cancer Father 61        lung    Cataract Daughter         congenital cataracts    Heart Disease Daughter         fast heart rythmn       Social History     Socioeconomic History    Marital status:      Spouse name: Not on file    Number of children: Not on file    Years of education: Not on file    Highest education level: Not on file Occupational History    Not on file   Tobacco Use    Smoking status: Never Smoker    Smokeless tobacco: Never Used   Substance and Sexual Activity    Alcohol use: No    Drug use: No    Sexual activity: Not on file   Other Topics Concern    Not on file   Social History Narrative    Not on file     Social Determinants of Health     Financial Resource Strain:     Difficulty of Paying Living Expenses: Not on file   Food Insecurity:     Worried About Running Out of Food in the Last Year: Not on file    Wally of Food in the Last Year: Not on file   Transportation Needs:     Lack of Transportation (Medical): Not on file    Lack of Transportation (Non-Medical):  Not on file   Physical Activity:     Days of Exercise per Week: Not on file    Minutes of Exercise per Session: Not on file   Stress:     Feeling of Stress : Not on file   Social Connections:     Frequency of Communication with Friends and Family: Not on file    Frequency of Social Gatherings with Friends and Family: Not on file    Attends Jain Services: Not on file    Active Member of [address] [place] [address] or Organizations: Not on file    Attends Club or Organization Meetings: Not on file    Marital Status: Not on file   Intimate Partner Violence:     Fear of Current or Ex-Partner: Not on file    Emotionally Abused: Not on file    Physically Abused: Not on file    Sexually Abused: Not on file   Housing Stability:     Unable to Pay for Housing in the Last Year: Not on file    Number of [place] in the Last Year: Not on file    Unstable Housing in the Last Year: Not on file         ALLERGIES: Clindamycin, Ketek [telithromycin], Levaquin [levofloxacin], Nexium [esomeprazole magnesium], Sudafed [pseudoephedrine hcl], Voltaren [diclofenac sodium], Xyzal [levocetirizine], Ceftin [cefuroxime axetil], Cortisone, Methylprednisolone, Mobic [meloxicam], Vibramycin [doxycycline calcium], Afrin [pseudoephedrine sulfate], Antihistamine-1, Azithromycin, Bactrim [sulfamethoprim ds], Biaxin [clarithromycin], Ciprofloxacin, Codeine, Doxycycline, Flagyl [metronidazole], Gabapentin, Pcn [penicillins], and Restasis [cyclosporine]    Review of Systems   Constitutional: Negative for appetite change and fever. HENT: Negative for congestion and rhinorrhea. Throat tightening. Eyes: Negative for discharge and redness. Respiratory: Positive for shortness of breath. Negative for cough. Cardiovascular: Negative for chest pain. Gastrointestinal: Negative for abdominal pain, diarrhea, nausea and vomiting. Genitourinary: Negative for decreased urine volume and dysuria. Musculoskeletal: Negative for back pain. Skin: Negative for rash and wound. Neurological: Negative for seizures and headaches. Hematological: Does not bruise/bleed easily. Psychiatric/Behavioral: Negative for agitation. All other systems reviewed and are negative. Vitals:    05/17/22 0818 05/17/22 0947 05/17/22 1225   BP: (!) 149/81 133/83 126/74   Pulse: 78 70 83   Resp: 18 18 18   Temp: 98 °F (36.7 °C)  98.3 °F (36.8 °C)   SpO2: 98% 100% 98%   Weight: 59 kg (130 lb)     Height: 5' 2\" (1.575 m)              Physical Exam  Vitals and nursing note reviewed. Constitutional:       General: She is not in acute distress. Appearance: Normal appearance. HENT:      Head: Normocephalic and atraumatic. Nose: Nose normal.      Mouth/Throat:      Mouth: Mucous membranes are moist.      Pharynx: Oropharynx is clear. Eyes:      Extraocular Movements: Extraocular movements intact. Conjunctiva/sclera: Conjunctivae normal.      Pupils: Pupils are equal, round, and reactive to light. Cardiovascular:      Rate and Rhythm: Normal rate and regular rhythm. Pulses: Normal pulses. Heart sounds: Normal heart sounds. No murmur heard. No friction rub. No gallop. Pulmonary:      Effort: Pulmonary effort is normal.      Breath sounds: Normal breath sounds.    Abdominal: General: Bowel sounds are normal. There is no distension. Palpations: Abdomen is soft. Tenderness: There is no abdominal tenderness. Musculoskeletal:         General: Normal range of motion. Cervical back: Normal range of motion. Skin:     General: Skin is warm and dry. Capillary Refill: Capillary refill takes less than 2 seconds. Neurological:      General: No focal deficit present. Mental Status: She is alert and oriented to person, place, and time. Mental status is at baseline. Psychiatric:         Mood and Affect: Mood normal.          MDM        Patient is an 29-year-old female history of multiple drug allergies who presents today with throat tightness and facial swelling. Says that she got a steroid injection in her back a few days ago. Today, she started to have an allergic reaction. On exam, patient is well-appearing in no acute distress. Her airway is intact. She has no stridor. Her face has no rash. She says that her throat feels tight. Lungs are clear. Patient was given Solu-Medrol as well as Benadryl and Pepcid here in the ED. On reassessment, patient says that she feels much better. We had a long discussion of the plan of care, as patient has an allergy to preservative in prednisone. I spoke with the pharmacist.  The plan is for the patient to be discharged home with a Medrol Dosepak, as well as an EpiPen with allergy follow up. We discussed close return precautions. The patient has been re-evaluated and feeling much better and are stable for discharge. All available radiology and laboratory results have been reviewed with patient and/or available family.   Patient and/or family verbally conveyed their understanding and agreement of the patient's signs, symptoms, diagnosis, treatment and prognosis and additionally agree to follow-up as recommended in the discharge instructions or to return to the Emergency Department should their condition change or worsen prior to their follow-up appointment. All questions have been answered and patient and/or available family express understanding. LABORATORY RESULTS:  Labs Reviewed   CBC WITH AUTOMATED DIFF - Abnormal; Notable for the following components:       Result Value    EOSINOPHILS 10 (*)     ABS. EOSINOPHILS 0.5 (*)     All other components within normal limits   METABOLIC PANEL, COMPREHENSIVE - Abnormal; Notable for the following components:    Sodium 132 (*)     Protein, total 8.3 (*)     Globulin 4.2 (*)     A-G Ratio 1.0 (*)     All other components within normal limits   SAMPLES BEING HELD   TROPONIN-HIGH SENSITIVITY       IMAGING RESULTS:  XR CHEST PA LAT   Final Result   Clear lungs. MEDICATIONS GIVEN:  Medications   methylPREDNISolone (PF) (Solu-MEDROL) injection 125 mg (125 mg IntraVENous Given 5/17/22 0927)   famotidine (PF) (PEPCID) 20 mg in 0.9% sodium chloride 10 mL injection (20 mg IntraVENous Given 5/17/22 0926)   diphenhydrAMINE (BENADRYL) injection 25 mg (25 mg IntraVENous Given 5/17/22 0927)       IMPRESSION:  1. Allergic reaction, initial encounter        PLAN:  Follow-up Information     Follow up With Specialties Details Why Contact Info    [address] DEP Emergency Medicine Go to  If symptoms worsen [address] [place] [address] [address]    Hayde Georges MD Internal Medicine Physician Schedule an appointment as soon as possible for a visit in 2 days  [address] [facility] Allergy and Asthma [facility] Allergy Schedule an appointment as soon as possible for a visit in 2 days  [address] [facility] [id number]  [phone]         Discharge Medication List as of 5/17/2022 12:09 PM      START taking these medications    Details   methylPREDNISolone (Medrol, José Miguel,) 4 mg tablet Please take medrol dose pack as per instructions on the dose pack. , Print, Disp-1 Dose Pack, R-0      EPINEPHrine (EPIPEN) 0.3 mg/0.3 mL injection 0.3 mL by IntraMUSCular route once as needed for Allergic Response for up to 1 dose., Print, Disp-1 Each, R-0         CONTINUE these medications which have NOT CHANGED    Details   triamcinolone acetonide (KENALOG) 0.1 % topical cream Apply  to affected area two (2) times daily as needed for Skin Irritation. use thin layer, Normal, Disp-80 g, R-1      pantoprazole sodium (PANTOPRAZOLE PO) Take 40 mg by mouth daily. , Historical Med      propranoloL (INDERAL) 10 mg tablet Take 1 Tab by mouth four (4) times daily as needed for Anxiety., Normal, Disp-60 Tab, R-6      acetaminophen (TYLENOL) 325 mg tablet Take 325 mg by mouth every eight (8) hours. , Historical Med      therapeutic multivitamin (THERAGRAN) tablet Take 1 Tab by mouth daily. , Historical Med      CALCIUM CITRATE (CITRACAL PO) Take 1 Tab by mouth two (2) times a day., Historical Med      polyethylene glycol (MIRALAX) 17 gram packet Take 17 g by mouth daily as needed., Historical Med      docusate sodium (COLACE) 100 mg capsule Take 100 mg by mouth two (2) times daily as needed., Historical Med      cholecalciferol, vitamin D3, 2,000 unit tab Take 1 Tab by mouth daily. , Historical Med      loteprednol etabonate (LOTEMAX) 0.5 % ophthalmic suspension Administer 1 Drop to both eyes daily. , Ezequiel Barros MD        Please note that this dictation was completed with [employer] voice recognition software. Quite often unanticipated grammatical, syntax, homophones, and other interpretive errors are inadvertently transcribed by the computer software. Please disregard these errors. Please excuse any errors that have escaped final proofreading.          Procedures

## 2022-05-17 NOTE — ED TRIAGE NOTES
Patient reports allergic reaction with throat tightening and difficulty swallowing that started yesterday. She says it is very hard to breath. She took benadryl 50 mg at 7 am but has not seen any improvement.

## 2022-05-20 ENCOUNTER — HOSPITAL ENCOUNTER (EMERGENCY)
Age: 82
Discharge: HOME | End: 2022-05-20
Attending: STUDENT IN AN ORGANIZED HEALTH CARE EDUCATION/TRAINING PROGRAM
Payer: MEDICARE

## 2022-05-20 VITALS
HEIGHT: 62 IN | DIASTOLIC BLOOD PRESSURE: 84 MMHG | TEMPERATURE: 98.7 F | RESPIRATION RATE: 18 BRPM | OXYGEN SATURATION: 100 % | WEIGHT: 130 LBS | BODY MASS INDEX: 23.92 KG/M2 | SYSTOLIC BLOOD PRESSURE: 143 MMHG | HEART RATE: 79 BPM

## 2022-05-20 DIAGNOSIS — T78.40XA ALLERGIC REACTION, INITIAL ENCOUNTER: Primary | ICD-10-CM

## 2022-05-20 LAB
ALBUMIN SERPL-MCNC: 4.3 G/DL (ref 3.5–5)
ALBUMIN/GLOB SERPL: 1 {RATIO} (ref 1.1–2.2)
ALP SERPL-CCNC: 65 U/L (ref 45–117)
ALT SERPL-CCNC: 18 U/L (ref 12–78)
ANION GAP SERPL CALC-SCNC: 5 MMOL/L (ref 5–15)
AST SERPL-CCNC: 16 U/L (ref 15–37)
BASOPHILS # BLD: 0 K/UL (ref 0–0.1)
BASOPHILS NFR BLD: 1 % (ref 0–1)
BILIRUB SERPL-MCNC: 0.4 MG/DL (ref 0.2–1)
BUN SERPL-MCNC: 12 MG/DL (ref 6–20)
BUN/CREAT SERPL: 17 (ref 12–20)
CALCIUM SERPL-MCNC: 9.9 MG/DL (ref 8.5–10.1)
CHLORIDE SERPL-SCNC: 97 MMOL/L (ref 97–108)
CO2 SERPL-SCNC: 29 MMOL/L (ref 21–32)
COMMENT, HOLDF: NORMAL
CREAT SERPL-MCNC: 0.71 MG/DL (ref 0.55–1.02)
DIFFERENTIAL METHOD BLD: NORMAL
EOSINOPHIL # BLD: 0 K/UL (ref 0–0.4)
EOSINOPHIL NFR BLD: 0 % (ref 0–7)
ERYTHROCYTE [DISTWIDTH] IN BLOOD BY AUTOMATED COUNT: 14.1 % (ref 11.5–14.5)
GLOBULIN SER CALC-MCNC: 4.3 G/DL (ref 2–4)
GLUCOSE SERPL-MCNC: 96 MG/DL (ref 65–100)
HCT VFR BLD AUTO: 42.1 % (ref 35–47)
HGB BLD-MCNC: 13.5 G/DL (ref 11.5–16)
IMM GRANULOCYTES # BLD AUTO: 0 K/UL (ref 0–0.04)
IMM GRANULOCYTES NFR BLD AUTO: 0 % (ref 0–0.5)
LYMPHOCYTES # BLD: 1.1 K/UL (ref 0.8–3.5)
LYMPHOCYTES NFR BLD: 16 % (ref 12–49)
MCH RBC QN AUTO: 29.1 PG (ref 26–34)
MCHC RBC AUTO-ENTMCNC: 32.1 G/DL (ref 30–36.5)
MCV RBC AUTO: 90.7 FL (ref 80–99)
MONOCYTES # BLD: 0.7 K/UL (ref 0–1)
MONOCYTES NFR BLD: 10 % (ref 5–13)
NEUTS SEG # BLD: 5 K/UL (ref 1.8–8)
NEUTS SEG NFR BLD: 73 % (ref 32–75)
NRBC # BLD: 0 K/UL (ref 0–0.01)
NRBC BLD-RTO: 0 PER 100 WBC
PLATELET # BLD AUTO: 270 K/UL (ref 150–400)
PMV BLD AUTO: 9.6 FL (ref 8.9–12.9)
POTASSIUM SERPL-SCNC: 3.8 MMOL/L (ref 3.5–5.1)
PROT SERPL-MCNC: 8.6 G/DL (ref 6.4–8.2)
RBC # BLD AUTO: 4.64 M/UL (ref 3.8–5.2)
SAMPLES BEING HELD,HOLD: NORMAL
SODIUM SERPL-SCNC: 131 MMOL/L (ref 136–145)
WBC # BLD AUTO: 6.8 K/UL (ref 3.6–11)

## 2022-05-20 PROCEDURE — 74011250636 HC RX REV CODE- 250/636: Performed by: STUDENT IN AN ORGANIZED HEALTH CARE EDUCATION/TRAINING PROGRAM

## 2022-05-20 PROCEDURE — 36415 COLL VENOUS BLD VENIPUNCTURE: CPT

## 2022-05-20 PROCEDURE — 80053 COMPREHEN METABOLIC PANEL: CPT

## 2022-05-20 PROCEDURE — 99283 EMERGENCY DEPT VISIT LOW MDM: CPT

## 2022-05-20 PROCEDURE — 85025 COMPLETE CBC W/AUTO DIFF WBC: CPT

## 2022-05-20 RX ORDER — DEXAMETHASONE 6 MG/1
6 TABLET ORAL DAILY
Qty: 13 TABLET | Refills: 0 | OUTPATIENT
Start: 2022-05-20 | End: 2022-06-03

## 2022-05-20 RX ORDER — DEXAMETHASONE 4 MG/1
10 TABLET ORAL
Status: COMPLETED | OUTPATIENT
Start: 2022-05-20 | End: 2022-05-20

## 2022-05-20 RX ADMIN — DEXAMETHASONE 10 MG: 4 TABLET ORAL at 11:34

## 2022-05-20 NOTE — ED PROVIDER NOTES
Patient is a 6year-old female with a complex medical history with allergies for multiple medications and formulations. Patient's recent history of allergic reactions as prolonged. On May 12 patient had a Depo-Medrol injection to her coccyx. This medication was preservative-free as the patient thought it would be fine. On May 17 patient began having sensation of throat swelling facial rash and swelling and was seen in the ED here and given methylprednisolone, Benadryl and Pepcid and was observed for some time and was found to be stable and discharged home with a methylprednisolone Dosepak for 6 days and Benadryl. On chart review patient has facial swelling from methylprednisolone in the past but they felt the risks are worth the benefits in this case. On the 18th she started the methylprednisolone Dosepak and had some nausea and fatigue but no other allergic symptoms. On May 19 she had a good day with only mild rash that subsided with Benadryl. However today on May 20 patient started having stronger facial rash and felt some throat tightening even after taking Benadryl and the Dosepak. On evaluation in the ED patient's ABCs are intact. No throat swelling on physical exam, no signs of Ludewig's angioma. Vital signs are stable. No wheezing, vomiting or other signs of anaphylaxis. The history is provided by the patient and medical records. Allergic Reaction   This is a recurrent problem. The current episode started more than 1 week ago. The problem has been gradually worsening. Ingested substance: Suspected steroid injection. She has not vomited. Associated symptoms include nausea. Pertinent negatives include no unusual behavior, no slurred speech, no walking unsteadily and no vomiting. Her past medical history does not include psychiatric care.         Past Medical History:   Diagnosis Date    Diverticulitis     Dystrophy, cornea     Environmental allergies     Family history of skin cancer     GERD (gastroesophageal reflux disease)     Hypotension     Ill-defined condition     Hyponatremia    Menopause     LMP-unknown    Multiple drug allergies     Osteoporosis     Dr. Traci Koch at [address] 05/2019    Sun-damaged skin     Vertebral compression fracture ([address] 01/05/12    Vocal cord anomaly     vocal cord dysfunction per speech therapist       Past Surgical History:   Procedure Laterality Date    COLONOSCOPY N/A 7/7/2017    COLONOSCOPY performed by Albert Holden [facility] at [facility] ENDOSCOPY     [facility] HX CATARACT REMOVAL  07/2012    HX GYN      D&C    HX MOHS PROCEDURES  04/11/2017    [facility] cheek by Dr[facility] Barriga [facility]         Family History:   Problem Relation Age of Onset    Heart Disease Mother     Cancer Father 61        lung    Cataract Daughter         congenital cataracts    Heart Disease Daughter         fast heart rythmn       Social History     Socioeconomic History    Marital status:      Spouse name: Not on file    Number of children: Not on file    Years of education: Not on file    Highest education level: Not on file   Occupational History    Not on file   Tobacco Use    Smoking status: Never Smoker    Smokeless tobacco: Never Used   Substance and Sexual Activity    Alcohol use: No    Drug use: No    Sexual activity: Not on file   Other Topics Concern    Not on file   Social History Narrative    Not on file     Social Determinants of Health     Financial Resource Strain:     Difficulty of Paying Living Expenses: Not on file   Food Insecurity:     Worried About [address] in the Last Year: Not on file    [address] [facility] [address] in the Last Year: Not on file   Transportation Needs:     Lack of Transportation (Medical): Not on file    Lack of Transportation (Non-Medical):  Not on file   Physical Activity:     Days of Exercise per Week: Not on file    Minutes of Exercise per Session: Not on file   Stress:     Feeling of Stress : Not on file   Social Connections:     Frequency of Communication with Friends and Family: Not on file    Frequency of Social Gatherings with Friends and Family: Not on file    Attends Sikhism Services: Not on file    Active Member of Clubs or Organizations: Not on file    Attends Club or Organization Meetings: Not on file    Marital Status: Not on file   Intimate Partner Violence:     Fear of Current or Ex-Partner: Not on file    Emotionally Abused: Not on file    Physically Abused: Not on file    Sexually Abused: Not on file   Housing Stability:     Unable to Pay for Housing in the Last Year: Not on file    Number of [place] in the Last Year: Not on file    Unstable Housing in the Last Year: Not on file         ALLERGIES: Clindamycin, Ketek [telithromycin], Levaquin [levofloxacin], Nexium [esomeprazole magnesium], Sudafed [pseudoephedrine hcl], Voltaren [diclofenac sodium], Xyzal [levocetirizine], Ceftin [cefuroxime axetil], Cortisone, Methylprednisolone, Mobic [meloxicam], Vibramycin [doxycycline calcium], Afrin [pseudoephedrine sulfate], Antihistamine-1, Azithromycin, Bactrim [sulfamethoprim ds], Biaxin [clarithromycin], Ciprofloxacin, Codeine, Doxycycline, Flagyl [metronidazole], Gabapentin, Pcn [penicillins], and Restasis [cyclosporine]    Review of Systems   Constitutional: Negative. HENT: Positive for facial swelling and sore throat. Eyes: Negative. Respiratory: Negative. Cardiovascular: Negative. Gastrointestinal: Positive for nausea. Negative for vomiting. Endocrine: Negative. Genitourinary: Negative. Musculoskeletal: Negative. Skin: Positive for rash. Allergic/Immunologic: Negative. Neurological: Negative. Hematological: Negative. Psychiatric/Behavioral: Negative.         Vitals:    05/20/22 1044   BP: (!) 143/84   Pulse: 79   Resp: 18   Temp: 98.7 °F (37.1 °C)   SpO2: 100%   Weight: 59 kg (130 lb)   Height: 5' 2\" (1.575 m)            Physical Exam  Vitals and nursing note reviewed. Constitutional:       General: She is not in acute distress. Appearance: Normal appearance. HENT:      Head: Normocephalic and atraumatic. Right Ear: External ear normal.      Left Ear: External ear normal.      Nose: Nose normal.   Eyes:      Extraocular Movements: Extraocular movements intact. Conjunctiva/sclera: Conjunctivae normal.   Cardiovascular:      Rate and Rhythm: Normal rate. Pulses: Normal pulses. Radial pulses are 2+ on the right side and 2+ on the left side. Heart sounds: Normal heart sounds. Pulmonary:      Effort: Pulmonary effort is normal.      Breath sounds: Normal breath sounds. Chest:      Chest wall: No deformity or tenderness. Abdominal:      General: Abdomen is flat. There is no distension. Tenderness: There is no abdominal tenderness. Musculoskeletal:         General: No deformity or signs of injury. Normal range of motion. Cervical back: Normal range of motion and neck supple. No tenderness. Skin:     General: Skin is warm and dry. Capillary Refill: Capillary refill takes less than 2 seconds. Findings: Erythema present. Comments: Patient has erythema on the cheeks and endorses some facial stretching. Brawniness of the neck or chin. No swelling under the tongue. Oropharynx is clear without signs of swelling or airway obstruction. Neurological:      General: No focal deficit present. Mental Status: She is alert and oriented to person, place, and time.    Psychiatric:         Attention and Perception: Attention normal.         Mood and Affect: Mood normal.         Behavior: Behavior normal.          MDM     MEDICATIONS GIVEN:  Medications   dexAMETHasone (DECADRON) tablet 10 mg (10 mg Oral Given 5/20/22 1134)       Differential diagnosis: Allergic reaction, drug reaction, rash, facial swelling, Stewart's angioma, airway compromise    MDM: Patient is an 69-year-old female with a complex medical history with multiple allergies to medications with allergic reaction symptoms since May 17. On evaluation here ABCs are intact, suspect patient has a generalized allergic reaction without anaphylaxis or airway compromise. No signs of Stewart's angioma or angioedema. Symptoms are clouded by the fact she is on methylprednisolone which she has an allergic reaction to that causes facial swelling and rash. To clear the picture we will attempt Decadron p.o. Dose given here in the ED and prescription written for outpatient management for 13 more days. Patient currently only under observation in the ED.    ,11:59 AM  Change of shift. Care of patient signed over to Dr. Greyson Patten. Handoff complete. IMPRESSION:  1. Allergic reaction, initial encounter        DISPOSITION:     Eve Muñiz.  Eber Mathur MD    Procedures

## 2022-05-20 NOTE — ED NOTES
12:01 PM  Change of shift. Care of patient taken over from Dr. Kavon Grant; H&P reviewed, bedside handoff complete. Awaiting response to dexamethasone, anticipated discharge if remains in no distress 1 hour after medications. 12:42 PM  PAtient reassessed and remains in no distress. Will discharge home.

## 2022-05-20 NOTE — ED TRIAGE NOTES
Pt arrived from home c/o an allergic reaction to Depomedrol prescribed for pain in her coccyx. Pt received a Dose Pk before leaving the ER yesterday but symtomps haven't resolved. Pt still experiencing facial swelling, throat tightening. Sats 100% in triage.

## 2022-05-23 PROBLEM — D69.2 SENILE PURPURA (HCC): Status: ACTIVE | Noted: 2022-05-23

## 2022-06-03 ENCOUNTER — APPOINTMENT (OUTPATIENT)
Dept: CT IMAGING | Age: 82
DRG: 092 | End: 2022-06-03
Attending: STUDENT IN AN ORGANIZED HEALTH CARE EDUCATION/TRAINING PROGRAM
Payer: MEDICARE

## 2022-06-03 ENCOUNTER — HOSPITAL ENCOUNTER (INPATIENT)
Age: 82
LOS: 1 days | Discharge: HOME | DRG: 092 | End: 2022-06-03
Attending: STUDENT IN AN ORGANIZED HEALTH CARE EDUCATION/TRAINING PROGRAM | Admitting: INTERNAL MEDICINE
Payer: MEDICARE

## 2022-06-03 ENCOUNTER — HOME HEALTH ADMISSION (OUTPATIENT)
Dept: HOME HEALTH SERVICES | Facility: HOME HEALTH | Age: 82
End: 2022-06-03

## 2022-06-03 ENCOUNTER — APPOINTMENT (OUTPATIENT)
Dept: VASCULAR SURGERY | Age: 82
DRG: 092 | End: 2022-06-03
Attending: INTERNAL MEDICINE
Payer: MEDICARE

## 2022-06-03 ENCOUNTER — APPOINTMENT (OUTPATIENT)
Dept: MRI IMAGING | Age: 82
DRG: 092 | End: 2022-06-03
Attending: INTERNAL MEDICINE
Payer: MEDICARE

## 2022-06-03 ENCOUNTER — APPOINTMENT (OUTPATIENT)
Dept: GENERAL RADIOLOGY | Age: 82
DRG: 092 | End: 2022-06-03
Attending: STUDENT IN AN ORGANIZED HEALTH CARE EDUCATION/TRAINING PROGRAM
Payer: MEDICARE

## 2022-06-03 VITALS
HEIGHT: 62 IN | BODY MASS INDEX: 21.54 KG/M2 | DIASTOLIC BLOOD PRESSURE: 78 MMHG | RESPIRATION RATE: 18 BRPM | WEIGHT: 117.06 LBS | TEMPERATURE: 98.6 F | SYSTOLIC BLOOD PRESSURE: 133 MMHG | OXYGEN SATURATION: 95 % | HEART RATE: 86 BPM

## 2022-06-03 DIAGNOSIS — I63.9 CEREBROVASCULAR ACCIDENT (CVA), UNSPECIFIED MECHANISM (HCC): Primary | ICD-10-CM

## 2022-06-03 DIAGNOSIS — R20.2 PARESTHESIA: ICD-10-CM

## 2022-06-03 LAB
ALBUMIN SERPL-MCNC: 4 G/DL (ref 3.5–5)
ALBUMIN/GLOB SERPL: 1 {RATIO} (ref 1.1–2.2)
ALP SERPL-CCNC: 74 U/L (ref 45–117)
ALT SERPL-CCNC: 21 U/L (ref 12–78)
AMPHET UR QL SCN: NEGATIVE
ANION GAP SERPL CALC-SCNC: 3 MMOL/L (ref 5–15)
APPEARANCE UR: CLEAR
AST SERPL-CCNC: 28 U/L (ref 15–37)
ATRIAL RATE: 68 BPM
BACTERIA URNS QL MICRO: NEGATIVE /HPF
BARBITURATES UR QL SCN: NEGATIVE
BASOPHILS # BLD: 0.1 K/UL (ref 0–0.1)
BASOPHILS NFR BLD: 1 % (ref 0–1)
BENZODIAZ UR QL: NEGATIVE
BILIRUB SERPL-MCNC: 0.5 MG/DL (ref 0.2–1)
BILIRUB UR QL: NEGATIVE
BUN SERPL-MCNC: 12 MG/DL (ref 6–20)
BUN/CREAT SERPL: 16 (ref 12–20)
CALCIUM SERPL-MCNC: 9.5 MG/DL (ref 8.5–10.1)
CALCULATED P AXIS, ECG09: 54 DEGREES
CALCULATED R AXIS, ECG10: 84 DEGREES
CALCULATED T AXIS, ECG11: 47 DEGREES
CANNABINOIDS UR QL SCN: NEGATIVE
CHLORIDE SERPL-SCNC: 99 MMOL/L (ref 97–108)
CHOLEST SERPL-MCNC: 223 MG/DL
CO2 SERPL-SCNC: 30 MMOL/L (ref 21–32)
COCAINE UR QL SCN: NEGATIVE
COLOR UR: NORMAL
COMMENT, HOLDF: NORMAL
CREAT SERPL-MCNC: 0.77 MG/DL (ref 0.55–1.02)
CRP SERPL-MCNC: <0.29 MG/DL (ref 0–0.6)
DIAGNOSIS, 93000: NORMAL
DIFFERENTIAL METHOD BLD: NORMAL
DRUG SCRN COMMENT,DRGCM: NORMAL
EOSINOPHIL # BLD: 0.4 K/UL (ref 0–0.4)
EOSINOPHIL NFR BLD: 7 % (ref 0–7)
EPITH CASTS URNS QL MICRO: NORMAL /LPF
ERYTHROCYTE [DISTWIDTH] IN BLOOD BY AUTOMATED COUNT: 14.1 % (ref 11.5–14.5)
EST. AVERAGE GLUCOSE BLD GHB EST-MCNC: 117 MG/DL
GLOBULIN SER CALC-MCNC: 4 G/DL (ref 2–4)
GLUCOSE BLD STRIP.AUTO-MCNC: 92 MG/DL (ref 65–117)
GLUCOSE SERPL-MCNC: 92 MG/DL (ref 65–100)
GLUCOSE UR STRIP.AUTO-MCNC: NEGATIVE MG/DL
HBA1C MFR BLD: 5.7 % (ref 4–5.6)
HCT VFR BLD AUTO: 38.8 % (ref 35–47)
HDLC SERPL-MCNC: 74 MG/DL
HDLC SERPL: 3 {RATIO} (ref 0–5)
HGB BLD-MCNC: 12.9 G/DL (ref 11.5–16)
HGB UR QL STRIP: NEGATIVE
IMM GRANULOCYTES # BLD AUTO: 0 K/UL (ref 0–0.04)
IMM GRANULOCYTES NFR BLD AUTO: 0 % (ref 0–0.5)
INR PPP: 1 (ref 0.9–1.1)
KETONES UR QL STRIP.AUTO: NEGATIVE MG/DL
LDLC SERPL CALC-MCNC: 133.4 MG/DL (ref 0–100)
LEUKOCYTE ESTERASE UR QL STRIP.AUTO: NEGATIVE
LYMPHOCYTES # BLD: 1.5 K/UL (ref 0.8–3.5)
LYMPHOCYTES NFR BLD: 28 % (ref 12–49)
MAGNESIUM SERPL-MCNC: 2.3 MG/DL (ref 1.6–2.4)
MCH RBC QN AUTO: 29.7 PG (ref 26–34)
MCHC RBC AUTO-ENTMCNC: 33.2 G/DL (ref 30–36.5)
MCV RBC AUTO: 89.4 FL (ref 80–99)
METHADONE UR QL: NEGATIVE
MONOCYTES # BLD: 0.5 K/UL (ref 0–1)
MONOCYTES NFR BLD: 10 % (ref 5–13)
NEUTS SEG # BLD: 2.8 K/UL (ref 1.8–8)
NEUTS SEG NFR BLD: 54 % (ref 32–75)
NITRITE UR QL STRIP.AUTO: NEGATIVE
NRBC # BLD: 0 K/UL (ref 0–0.01)
NRBC BLD-RTO: 0 PER 100 WBC
OPIATES UR QL: NEGATIVE
P-R INTERVAL, ECG05: 162 MS
PCP UR QL: NEGATIVE
PH UR STRIP: 7 [PH] (ref 5–8)
PLATELET # BLD AUTO: 246 K/UL (ref 150–400)
PMV BLD AUTO: 10.7 FL (ref 8.9–12.9)
POTASSIUM SERPL-SCNC: 4.1 MMOL/L (ref 3.5–5.1)
PROT SERPL-MCNC: 8 G/DL (ref 6.4–8.2)
PROT UR STRIP-MCNC: NEGATIVE MG/DL
PROTHROMBIN TIME: 10.5 SEC (ref 9–11.1)
Q-T INTERVAL, ECG07: 420 MS
QRS DURATION, ECG06: 126 MS
QTC CALCULATION (BEZET), ECG08: 446 MS
RBC # BLD AUTO: 4.34 M/UL (ref 3.8–5.2)
RBC #/AREA URNS HPF: NORMAL /HPF (ref 0–5)
SAMPLES BEING HELD,HOLD: NORMAL
SERVICE CMNT-IMP: NORMAL
SODIUM SERPL-SCNC: 132 MMOL/L (ref 136–145)
SP GR UR REFRACTOMETRY: 1.01 (ref 1–1.03)
TRIGL SERPL-MCNC: 78 MG/DL (ref ?–150)
TROPONIN-HIGH SENSITIVITY: 4 NG/L (ref 0–51)
TROPONIN-HIGH SENSITIVITY: 5 NG/L (ref 0–51)
TSH SERPL DL<=0.05 MIU/L-ACNC: 5.91 UIU/ML (ref 0.36–3.74)
UR CULT HOLD, URHOLD: NORMAL
UROBILINOGEN UR QL STRIP.AUTO: 0.2 EU/DL (ref 0.2–1)
VENTRICULAR RATE, ECG03: 68 BPM
VIT B12 SERPL-MCNC: 841 PG/ML (ref 193–986)
VLDLC SERPL CALC-MCNC: 15.6 MG/DL
WBC # BLD AUTO: 5.2 K/UL (ref 3.6–11)
WBC URNS QL MICRO: NORMAL /HPF (ref 0–4)

## 2022-06-03 PROCEDURE — 99223 1ST HOSP IP/OBS HIGH 75: CPT | Performed by: PSYCHIATRY & NEUROLOGY

## 2022-06-03 PROCEDURE — 74011250637 HC RX REV CODE- 250/637: Performed by: INTERNAL MEDICINE

## 2022-06-03 PROCEDURE — 99285 EMERGENCY DEPT VISIT HI MDM: CPT

## 2022-06-03 PROCEDURE — 93005 ELECTROCARDIOGRAM TRACING: CPT

## 2022-06-03 PROCEDURE — 81001 URINALYSIS AUTO W/SCOPE: CPT

## 2022-06-03 PROCEDURE — 84443 ASSAY THYROID STIM HORMONE: CPT

## 2022-06-03 PROCEDURE — 80307 DRUG TEST PRSMV CHEM ANLYZR: CPT

## 2022-06-03 PROCEDURE — 80053 COMPREHEN METABOLIC PANEL: CPT

## 2022-06-03 PROCEDURE — 86140 C-REACTIVE PROTEIN: CPT

## 2022-06-03 PROCEDURE — 83735 ASSAY OF MAGNESIUM: CPT

## 2022-06-03 PROCEDURE — 70450 CT HEAD/BRAIN W/O DYE: CPT

## 2022-06-03 PROCEDURE — 70544 MR ANGIOGRAPHY HEAD W/O DYE: CPT

## 2022-06-03 PROCEDURE — 84480 ASSAY TRIIODOTHYRONINE (T3): CPT

## 2022-06-03 PROCEDURE — 80061 LIPID PANEL: CPT

## 2022-06-03 PROCEDURE — 84484 ASSAY OF TROPONIN QUANT: CPT

## 2022-06-03 PROCEDURE — 82607 VITAMIN B-12: CPT

## 2022-06-03 PROCEDURE — 71045 X-RAY EXAM CHEST 1 VIEW: CPT

## 2022-06-03 PROCEDURE — 93880 EXTRACRANIAL BILAT STUDY: CPT

## 2022-06-03 PROCEDURE — 83036 HEMOGLOBIN GLYCOSYLATED A1C: CPT

## 2022-06-03 PROCEDURE — 97116 GAIT TRAINING THERAPY: CPT

## 2022-06-03 PROCEDURE — 65270000046 HC RM TELEMETRY

## 2022-06-03 PROCEDURE — 36415 COLL VENOUS BLD VENIPUNCTURE: CPT

## 2022-06-03 PROCEDURE — 85610 PROTHROMBIN TIME: CPT

## 2022-06-03 PROCEDURE — 85025 COMPLETE CBC W/AUTO DIFF WBC: CPT

## 2022-06-03 PROCEDURE — 97530 THERAPEUTIC ACTIVITIES: CPT

## 2022-06-03 PROCEDURE — 74011250637 HC RX REV CODE- 250/637: Performed by: STUDENT IN AN ORGANIZED HEALTH CARE EDUCATION/TRAINING PROGRAM

## 2022-06-03 PROCEDURE — 74011250636 HC RX REV CODE- 250/636: Performed by: INTERNAL MEDICINE

## 2022-06-03 PROCEDURE — 97161 PT EVAL LOW COMPLEX 20 MIN: CPT

## 2022-06-03 PROCEDURE — APPNB60 APP NON BILLABLE TIME 46-60 MINS: Performed by: NURSE PRACTITIONER

## 2022-06-03 PROCEDURE — 97535 SELF CARE MNGMENT TRAINING: CPT

## 2022-06-03 PROCEDURE — 82962 GLUCOSE BLOOD TEST: CPT

## 2022-06-03 PROCEDURE — 70551 MRI BRAIN STEM W/O DYE: CPT

## 2022-06-03 PROCEDURE — 97165 OT EVAL LOW COMPLEX 30 MIN: CPT

## 2022-06-03 RX ORDER — CLOPIDOGREL 300 MG/1
300 TABLET, FILM COATED ORAL
Status: COMPLETED | OUTPATIENT
Start: 2022-06-03 | End: 2022-06-03

## 2022-06-03 RX ORDER — PROPRANOLOL HYDROCHLORIDE 10 MG/1
10 TABLET ORAL
Status: DISCONTINUED | OUTPATIENT
Start: 2022-06-03 | End: 2022-06-03 | Stop reason: HOSPADM

## 2022-06-03 RX ORDER — ATORVASTATIN CALCIUM 20 MG/1
40 TABLET, FILM COATED ORAL
Status: DISCONTINUED | OUTPATIENT
Start: 2022-06-03 | End: 2022-06-03 | Stop reason: HOSPADM

## 2022-06-03 RX ORDER — ENOXAPARIN SODIUM 100 MG/ML
40 INJECTION SUBCUTANEOUS EVERY 24 HOURS
Status: DISCONTINUED | OUTPATIENT
Start: 2022-06-03 | End: 2022-06-03 | Stop reason: HOSPADM

## 2022-06-03 RX ORDER — SODIUM CHLORIDE, SODIUM LACTATE, POTASSIUM CHLORIDE, CALCIUM CHLORIDE 600; 310; 30; 20 MG/100ML; MG/100ML; MG/100ML; MG/100ML
75 INJECTION, SOLUTION INTRAVENOUS CONTINUOUS
Status: DISCONTINUED | OUTPATIENT
Start: 2022-06-03 | End: 2022-06-03 | Stop reason: HOSPADM

## 2022-06-03 RX ORDER — BISACODYL 5 MG
5 TABLET, DELAYED RELEASE (ENTERIC COATED) ORAL DAILY PRN
Status: DISCONTINUED | OUTPATIENT
Start: 2022-06-03 | End: 2022-06-03 | Stop reason: HOSPADM

## 2022-06-03 RX ORDER — ACETAMINOPHEN 650 MG/1
650 SUPPOSITORY RECTAL
Status: DISCONTINUED | OUTPATIENT
Start: 2022-06-03 | End: 2022-06-03 | Stop reason: HOSPADM

## 2022-06-03 RX ORDER — THERA TABS 400 MCG
1 TAB ORAL DAILY
Status: DISCONTINUED | OUTPATIENT
Start: 2022-06-03 | End: 2022-06-03 | Stop reason: HOSPADM

## 2022-06-03 RX ORDER — GUAIFENESIN 100 MG/5ML
81 LIQUID (ML) ORAL DAILY
Status: DISCONTINUED | OUTPATIENT
Start: 2022-06-03 | End: 2022-06-03 | Stop reason: HOSPADM

## 2022-06-03 RX ORDER — ACETAMINOPHEN 325 MG/1
650 TABLET ORAL
Status: DISCONTINUED | OUTPATIENT
Start: 2022-06-03 | End: 2022-06-03 | Stop reason: HOSPADM

## 2022-06-03 RX ORDER — ACETAMINOPHEN 500 MG
1000 TABLET ORAL ONCE
Status: COMPLETED | OUTPATIENT
Start: 2022-06-03 | End: 2022-06-03

## 2022-06-03 RX ORDER — ONDANSETRON 2 MG/ML
4 INJECTION INTRAMUSCULAR; INTRAVENOUS
Status: DISCONTINUED | OUTPATIENT
Start: 2022-06-03 | End: 2022-06-03 | Stop reason: HOSPADM

## 2022-06-03 RX ADMIN — ENOXAPARIN SODIUM 40 MG: 100 INJECTION SUBCUTANEOUS at 07:05

## 2022-06-03 RX ADMIN — ASPIRIN 81 MG 81 MG: 81 TABLET ORAL at 03:15

## 2022-06-03 RX ADMIN — CLOPIDOGREL BISULFATE 300 MG: 300 TABLET, FILM COATED ORAL at 04:37

## 2022-06-03 RX ADMIN — ACETAMINOPHEN 1000 MG: 500 TABLET ORAL at 07:03

## 2022-06-03 RX ADMIN — THERA TABS 1 TABLET: TAB at 12:03

## 2022-06-03 RX ADMIN — SODIUM CHLORIDE, POTASSIUM CHLORIDE, SODIUM LACTATE AND CALCIUM CHLORIDE 75 ML/HR: 600; 310; 30; 20 INJECTION, SOLUTION INTRAVENOUS at 07:11

## 2022-06-03 NOTE — PROGRESS NOTES
Neurocritical Care Code Stroke Documentation      Symptoms:   right leg numbness that eventually moved to right arm and face   Last Known Well: 07:15 am 22   Medical hx: Past Medical History:   Diagnosis Date    Diverticulitis     Dystrophy, cornea     Environmental allergies     Family history of skin cancer     GERD (gastroesophageal reflux disease)     Hypotension     Ill-defined condition     Hyponatremia    Menopause     LMP-unknown    Multiple drug allergies     Osteoporosis     Dr. Claudio Camargo at [address] 2019    Sun-damaged skin     Vertebral compression fracture ([facility] [address] 12    Vocal cord anomaly     vocal cord dysfunction per speech therapist      Anticoagulation: None   VAN:  Negative   NIHSS:   1a-LOC:0    1b-Month/Age:0    1c-Open/Close Hand:0    2-Best Gaze:0    3-Visual Fields:0    4-Facial Palsy:0    5a-Left Arm:0    5b-Right Arm:0    6a-Left Le    6b-Right Le    7-Limb Ataxia:0    8-Sensory:1    9-Best Language:0    10-Dysarthria:0    11-Extinction/Inattention:0  TOTAL SCORE:2   Imaging:   CT- No acute abnormality    CTA- unable to have due to contrast allergy    CTP   Plan:   TPA Candidate: NO    Mechanical thrombectomy Candidate: NO  Admit for TIA/CVA workup, MRI, aspirin, plavix     *Perform dysphagia screening prior to any PO intake*    Discussed with: Dr. Lizzy FUNG attending, Dr. Hero [facility]    Arrival time: 2:26 am  Time spent: 60 minutes.      Monty Savage NP  Neurocritical Care Nurse Practitioner  [phone]

## 2022-06-03 NOTE — PROGRESS NOTES
[facility]                                                                                          Hospitalist Progress Note  Medhat Fulton MD  Answering service: [phone] from in house phone        Date of Service:  6/3/2022  NAME:  Sana BURGER:  1940  MRN:  [id number]      Admission Summary: This is an 58-year-old woman with past medical history significant for osteoporosis and degenerative disc disease was in her usual state of health until 7 a.m. yesterday when the patient developed right-sided numbness. The patient stated that the numbness started in the right leg and progressed to involve the entire right side including numbness of the right side of the face. The patient did not come to the emergency room immediately, but because the symptoms did not resolve, she decided to come to the emergency room for further evaluation. She was also unsteady on her feet. The patient was brought to the emergency room as code stroke. When the patient arrived at the emergency room, CT scan of the head without contrast was obtained. This did not show any acute pathology. The patient is clearly outside the tPA window because her symptoms started almost 24 hours earlier. The patient was referred to the hospitalist service for evaluation for admission. The patient had a similar presentation and was admitted to this hospital in 2001. Evaluation for stroke at that time was negative. Interval history / Subjective:   Patient seen in ED now complaining of both upper and lower right extremity weakness and left as well patient awaiting MRI chart reviewed overnight admission seen in ED 5     Assessment & Plan:     1. Suspected acute CVA. -- Awaiting MRI-1. Mild chronic age-related volume loss and white matter disease similar to  prior exam. No acute intracranial abnormality demonstrated.   -- complete other work-up echo carotid Doppler  --CT head and CTA reviewed on admission  --Permissive hypertension  --Continue aspirin and statin    2. Hyponatremia. --This is most likely due to volume depletion. This is mild. We will carry out fluid therapy and monitor the patient's sodium level. 3.  Subclinical hypothyroidism. We will check T3 and T4 level. 4.  Dyslipidemia. We will start the patient on Lipitor. 5.  Chronic back pain continue PT OT    Code status: Full  DVT prophylaxis: SCD    Care Plan discussed with: Patient/Family and Nurse  Anticipated Disposition: Home w/Family  Anticipated Discharge: Less than 24 hours     Hospital Problems  Date Reviewed: 6/3/2022          Codes Class Noted POA    * (Principal) Acute CVA (cerebrovascular accident) [facility]) ICD-10-CM: I63.9  ICD-9-CM: 434.91  2/16/2021 Yes                Review of Systems:   A comprehensive review of systems was negative. Vital Signs:    Last 24hrs VS reviewed since prior progress note.  Most recent are:  Visit Vitals  /80   Pulse 74   Temp 98.3 °F (36.8 °C)   Resp 23   Ht 5' 2\" (1.575 m)   Wt 53.1 kg (117 lb 1 oz)   SpO2 99%   BMI 21.41 kg/m²       No intake or output data in the 24 hours ending 06/03/22 1211     Physical Examination:     I had a face to face encounter with this patient and independently examined them on 6/3/2022 as outlined below:          General : alert x 3, awake, no acute distress, resting in bed, pleasant   HEENT: PEERL, EOMI, moist mucus membrane, TM clear  Neck: supple, no JVD, no meningeal signs  Chest: Clear to auscultation bilaterally   CVS: S1 S2 heard, Capillary refill less than 2 seconds  Abd: soft/ Non tender, non distended, BS physiological,   Ext: no clubbing, no cyanosis, no edema, brisk 2+ DP pulses  Neuro/Psych: pleasant mood and affect, CN 2-12 grossly intact, weakness in right upper and right lower extremity compared to the left    Data Review:    I personally reviewed  Image and labs      Labs:     Recent Labs     06/03/22  0305   WBC 5. 2   HGB 12.9   HCT 38.8        Recent Labs     06/03/22  0743 06/03/22  0305   NA  --  132*   K  --  4.1   CL  --  99   CO2  --  30   BUN  --  12   CREA  --  0.77   GLU  --  92   CA  --  9.5   MG 2.3  --      Recent Labs     06/03/22  0305   ALT 21   AP 74   TBILI 0.5   TP 8.0   ALB 4.0   GLOB 4.0     Recent Labs     06/03/22  0305   INR 1.0   PTP 10.5      No results for input(s): FE, TIBC, PSAT, FERR in the last 72 hours. Lab Results   Component Value Date/Time    Folate 53.1 (H) 02/16/2021 01:04 AM      No results for input(s): PH, PCO2, PO2 in the last 72 hours. No results for input(s): CPK, CKNDX, TROIQ in the last 72 hours.     No lab exists for component: CPKMB  Lab Results   Component Value Date/Time    Cholesterol, total 223 (H) 06/03/2022 03:05 AM    HDL Cholesterol 74 06/03/2022 03:05 AM    LDL, calculated 133.4 (H) 06/03/2022 03:05 AM    Triglyceride 78 06/03/2022 03:05 AM    CHOL/HDL Ratio 3.0 06/03/2022 03:05 AM     Lab Results   Component Value Date/Time    Glucose (POC) 92 06/03/2022 02:27 AM    Glucose (POC) 110 (H) 02/16/2021 12:24 AM     Lab Results   Component Value Date/Time    Color YELLOW/STRAW 06/03/2022 07:19 AM    Appearance CLEAR 06/03/2022 07:19 AM    Specific gravity 1.010 06/03/2022 07:19 AM    Specific gravity 1.016 05/15/2019 09:06 AM    pH (UA) 7.0 06/03/2022 07:19 AM    Protein Negative 06/03/2022 07:19 AM    Glucose Negative 06/03/2022 07:19 AM    Ketone Negative 06/03/2022 07:19 AM    Bilirubin Negative 06/03/2022 07:19 AM    Urobilinogen 0.2 06/03/2022 07:19 AM    Nitrites Negative 06/03/2022 07:19 AM    Leukocyte Esterase Negative 06/03/2022 07:19 AM    Epithelial cells FEW 06/03/2022 07:19 AM    Bacteria Negative 06/03/2022 07:19 AM    WBC 0-4 06/03/2022 07:19 AM    RBC 0-5 06/03/2022 07:19 AM         Medications Reviewed:     Current Facility-Administered Medications   Medication Dose Route Frequency    aspirin chewable tablet 81 mg  81 mg Oral DAILY    propranoloL (INDERAL) tablet 10 mg  10 mg Oral QID PRN    therapeutic multivitamin (THERAGRAN) tablet 1 Tablet  1 Tablet Oral DAILY    acetaminophen (TYLENOL) tablet 650 mg  650 mg Oral Q4H PRN    Or    acetaminophen (TYLENOL) solution 650 mg  650 mg Per NG tube Q4H PRN    Or    acetaminophen (TYLENOL) suppository 650 mg  650 mg Rectal Q4H PRN    lactated Ringers infusion  75 mL/hr IntraVENous CONTINUOUS    ondansetron (ZOFRAN) injection 4 mg  4 mg IntraVENous Q6H PRN    atorvastatin (LIPITOR) tablet 40 mg  40 mg Oral QHS    bisacodyL (DULCOLAX) tablet 5 mg  5 mg Oral DAILY PRN    enoxaparin (LOVENOX) injection 40 mg  40 mg SubCUTAneous Q24H     Current Outpatient Medications   Medication Sig    triamcinolone acetonide (KENALOG) 0.1 % topical cream Apply  to affected area two (2) times daily as needed for Skin Irritation. use thin layer    pantoprazole sodium (PANTOPRAZOLE PO) Take 40 mg by mouth daily.  propranoloL (INDERAL) 10 mg tablet Take 1 Tab by mouth four (4) times daily as needed for Anxiety.  acetaminophen (TYLENOL) 325 mg tablet Take 325 mg by mouth every eight (8) hours.  therapeutic multivitamin (THERAGRAN) tablet Take 1 Tab by mouth daily.  CALCIUM CITRATE (CITRACAL PO) Take 1 Tab by mouth two (2) times a day.  polyethylene glycol (MIRALAX) 17 gram packet Take 17 g by mouth daily as needed.  docusate sodium (COLACE) 100 mg capsule Take 100 mg by mouth two (2) times daily as needed.  cholecalciferol, vitamin D3, 2,000 unit tab Take 1 Tab by mouth daily.  loteprednol etabonate (LOTEMAX) 0.5 % ophthalmic suspension Administer 1 Drop to both eyes daily. ______________________________________________________________________  EXPECTED LENGTH OF STAY: - - -  ACTUAL LENGTH OF STAY:          0      Please note that this dictation was completed with [employer], the [employer] voice recognition software.   Quite often unanticipated grammatical, syntax, homophones, and other interpretive errors are inadvertently transcribed by the computer software. Please disregard these errors. Please excuse any errors that have escaped final proofreading.                 Medhat Fulton MD

## 2022-06-03 NOTE — PROGRESS NOTES
Speech pathology note  Patient currently off the floor for testing. SLP will follow as patient available. Thank you. Addendum 1104: Reviewed chart and note patient admitted with R numbness with concern for CVA. Note CT showed No acute intracranial abnormality and MRI showed Mild chronic age-related volume loss and white matter disease similar to prior exam, No acute intracranial abnormality demonstrated. Note patient passed the nursing dysphagia screen and a regular diet was ordered. Discussed case with RN who reported no SLP-related concerns. Introduced self and role of SLP to patient. Patient denied any decline in motor speech, language, cognitive, or swallowing function, and patient Ox4. Formal SLP evaluation not clinically indicated at this time. Will sign off. Please re-consult if further needs arise. Thank you.     Trent Ortiz., CCC-SLP

## 2022-06-03 NOTE — PROGRESS NOTES
Problem: Mobility Impaired (Adult and Pediatric)  Goal: *Acute Goals and Plan of Care (Insert Text)  Description: FUNCTIONAL STATUS PRIOR TO ADMISSION: Patient was independent and active without use of DME.    HOME SUPPORT PRIOR TO ADMISSION: The patient lived alone, did not require assistance. Has a daughter in the area who is out of town, returning 6/5/22. Physical Therapy Goals  Initiated 6/3/2022  1. Patient will transfer from bed to chair and chair to bed with independence using the least restrictive device within 7 day(s). 3.  Patient will perform sit to stand with independence within 7 day(s). 4.  Patient will ambulate with independence/ modified independence for 300 feet with the least restrictive device within 7 day(s). 5.  Patient will ascend/descend 8 stairs with handrail(s) with modified independence within 7 day(s). Outcome: Not Met     PHYSICAL THERAPY EVALUATION- NEURO POPULATION  Patient: Catarina Atwood (16 y.o. female)  Date: 6/3/2022  Primary Diagnosis: Acute CVA (cerebrovascular accident) [facility]) [I63.9]        Precautions:  Fall      ASSESSMENT  Based on the objective data described below, the patient presents with BLE numbness (RLE on arrival, New onset LLE this morning) and balance impairment limiting her ability to independently ambulate w/o an assistive device. Gait was altered, unsteady w/ loss of balance and with pt reaching for objects for balance support. Gait quality and balance were improved when using a rolling walker though pt would benefit from additional training to improve safety. Pt prefers not to use the walker, states she is able to rely on furniture inside the home. Educated pt in her fall risk with community ambulation and recommended she consider using her rolling walker until her numbness and balance improve. Pt endorses having the walker in her car and is somewhat agreeable to this. Therapy will follow for gait and balance.   Recommending home with HHPT and caregiver assistance if planning to DC before therapy follows up next week. For the weekend, recommend with nursing, once Egress Test completed, OOB to chair 3x/day and walking daily with one assist and rolling walker or hand held assist. Thank you for completing as able in order to maintain patient strength, endurance and independence. Current Level of Function Impacting Discharge (mobility/balance): Min hand held assist to ambulate (improved to SBA w/ the walker); CGA sit<->stand; Indep supine<->sit    Functional Outcome Measure: The patient scored Total: 30/56 on the Guajardo Balance Assessment which is indicative of moderate fall risk. Other factors to consider for discharge: lives alone, fall risk     Patient will benefit from skilled therapy intervention to address the above noted impairments. PLAN :  Recommendations and Planned Interventions: transfer training, gait training, therapeutic exercises, neuromuscular re-education, patient and family training/education, and therapeutic activities      Frequency/Duration: Patient will be followed by physical therapy:  5 times a week to address goals. Recommendation for discharge: (in order for the patient to meet his/her long term goals)  Physical therapy at least 2 days/week in the home AND ensure assist for safety with getting into the home. This discharge recommendation:  Has not yet been discussed the attending provider and/or case management    IF patient discharges home will need the following DME: patient owns DME required for discharge         SUBJECTIVE:   Patient stated I don't want to use a walker.     OBJECTIVE DATA SUMMARY:   HISTORY:    Past Medical History:   Diagnosis Date    Diverticulitis     Dystrophy, cornea     Environmental allergies     Family history of skin cancer     GERD (gastroesophageal reflux disease)     Hypotension     Ill-defined condition     Hyponatremia    Menopause     LMP-unknown    Multiple drug allergies     Osteoporosis     Dr. Cameron Vasquez at [address] 05/2019    Sun-damaged skin     Vertebral compression fracture ([address] 01/05/12    Vocal cord anomaly     vocal cord dysfunction per speech therapist     Past Surgical History:   Procedure Laterality Date    COLONOSCOPY N/A 7/7/2017    COLON[facility]COPY performed by Shawnee Robin MD at [facility] ENDOSCOPY     [address] HX CATARACT REMOVAL  07/2012    HX GYN      D&C    HX MOHS PROCEDURES  04/11/2017    [facility] left cheek by Dr. Jamar Lyn factors and/or comorbidities impacting plan of care:     Home Situation  Home Environment: Apartment  # Steps to Enter: 8  Rails to Enter: Yes  One/Two Story Residence: One story  Living Alone: Yes  Support Systems: Child(mike) (daughter lives in area)  Patient Expects to be Discharged to[de-identified] Home  Current DME Used/Available at Home: Merrick Grapes, rolling,Grab bars,Cane, straight  Tub or Shower Type: Tub/Shower combination    EXAMINATION/PRESENTATION/DECISION MAKING:   Critical Behavior:  Neurologic State: Alert  Orientation Level: Oriented X4  Cognition: Appropriate for age attention/concentration,Follows commands  Safety/Judgement: Awareness of environment,Fall prevention  Hearing:  Intact  Skin:  Exposed areas grossly intact  Edema: none noted  Range Of Motion:  AROM: Within functional limits           PROM: Within functional limits           Strength:    Strength:  Within functional limits                    Tone & Sensation:   Tone: Normal              Sensation: Impaired (impaired sensation in R hand, RLE and now LLE foot to knee)               Coordination:  Coordination: Within functional limits  Vision:   Tracking: Able to track stimulus in all quadrants w/o difficulty  Diplopia: No  Acuity: Within Defined Limits;Able to read employee name badge without difficulty  Corrective Lenses: Glasses  Functional Mobility:  Bed Mobility:     Supine to Sit: Supervision  Sit to Supine: Supervision  Scooting: Supervision  Transfers:  Sit to Stand: Contact guard assistance  Stand to Sit: Contact guard assistance                       Balance:   Sitting: Intact  Standing: Impaired; Without support  Standing - Static: Good  Standing - Dynamic : Good;Fair  Ambulation/Gait Training:  Distance (ft): 100 Feet (ft)  Assistive Device: Other (comment) (one hand held assist)  Ambulation - Level of Assistance: Minimal assistance;Assist x1     Gait Description (WDL): Exceptions to WDL  Gait Abnormalities: Decreased step clearance; Path deviations        Base of Support: Widened     Speed/Casie: Slow  Step Length: Right shortened;Left shortened  Swing Pattern: Left asymmetrical                Therapeutic Activity/ Education    - Educated in her fall risk ambulating in open spaces w/o support. Encouraged her to try the walker.     - Gait and transfer training with the RW. Cues provided for transfer technique (pushing up from seated surface, body and walker position in front of chair before sitting (attempted to set the walker off to the side and walk to the chair w/o it). Reinforced the benefit of relying on the walker until her numbness resolves and balance improves. Pt voiced understanding.    - Educated in safety returning home. Recommended having caregiver assist her if [address] before therapy returns Monday. - Completed [facility] education. Handout provided. - Assisted to the bathroom.   Pt managed own clothing w/ CGA for safety d/t impaired balance    Functional Measure  Guajardo Balance Test:    Sitting to Standing: 3  Standing Unsupported: 3  Sitting with Back Unsupported: 4  Standing to Sitting: 3  Transfers: 3  Standing Unsupported with Eyes Closed: 3  Standing Unsupported with Feet Together: 3  Reach Forward with Outstretched Arm: 3   Object: 3  Turn to Look Over Shoulders: 2 (limited by h/o spine surgery)  Turn 360 Degrees: 0  Alternate Foot on Step/Stool: 0  Standing Unsupported One Foot in Front: 0  Stand on One Le  Total:          56=Maximum possible score;   0-20=High fall risk  21-40=Moderate fall risk   41-56=Low fall risk        Physical Therapy Evaluation Charge Determination   History Examination Presentation Decision-Making   MEDIUM  Complexity : 1-2 comorbidities / personal factors will impact the outcome/ POC  MEDIUM Complexity : 3 Standardized tests and measures addressing body structure, function, activity limitation and / or participation in recreation  MEDIUM Complexity : Evolving with changing characteristics  Other outcome measures Guajardo   MEDIUM      Based on the above components, the patient evaluation is determined to be of the following complexity level: MEDIUM    Pain Rating:  Back (chronic) - attributes to the ED plinth. Improved with sitting. Activity Tolerance:   Good      After treatment patient left in no apparent distress:   Supine in bed and w/ transport taking her to MRI    COMMUNICATION/EDUCATION:   The patients plan of care was discussed with: Occupational therapist and Registered nurse. Patient was educated regarding her deficit(s) of numbness as this relates to her diagnosis of CVA. She demonstrated Good understanding as evidenced by discussions. Patient was verbally educated on the BE FAST acronym for signs/symptoms of CVA and TIA. BE FAST hand out was given to patient for visual education and reinforcement. All questions answered with patient indicating understanding. Fall prevention education was provided and the patient/caregiver indicated understanding., Patient/family have participated as able in goal setting and plan of care. , and Patient/family agree to work toward stated goals and plan of care.     Thank you for this referral.  Diandra De La Rosa, PT   Time Calculation: 32 mins

## 2022-06-03 NOTE — CONSULTS
Neuro consult completed dictated note to follow. This is patient's third presentation with similar complaints of bilateral lower extremity numbness, initial presentation in 2012 seen by Dr. Julia Meléndez unexplained numbness, again February 2021 seen by Dr. Sam Briceno at that time only in the left leg. Today reports it started in the right leg she saw her PCP then it moved up at 1230 or 1:00 this morning to her arm and face and since she has been in the ED it is now starting in her left leg. Her sensory exam is inconsistent and has no other focal deficits on exam.  Her MRI of the brain is negative for acute stroke. This is paresthesias of unclear etiology not consistent with TIA/CVA. She does have hyperlipidemia and would benefit from a statin, but I am hesitant to start any medications on this patient who claims she has 24 drug allergies including an allergy to steroid injection for which she received steroids as a treatment. Will defer to PCP. She does not need an echo.

## 2022-06-03 NOTE — ED PROVIDER NOTES
Patient is a 24-year-old female presenting to ED with right-sided numbness started at 7 AM this morning. .  Tier 2 code stroke called. Teleneurology evaluated the patient. Patient has severe allergic reaction to contrast.  CT scan without contrast obtained. The history is provided by the patient. Numbness  This is a new problem. The current episode started 12 to 24 hours ago. The problem has not changed since onset. There was right upper extremity, right lower extremity and right facial focality noted. Primary symptoms include loss of balance. There has been no fever. Pertinent negatives include no shortness of breath, no chest pain, no vomiting, no altered mental status, no confusion, no headaches, no choking, no nausea, no bowel incontinence and no bladder incontinence. There were no medications administered prior to arrival. Associated medical issues do not include CVA.         Past Medical History:   Diagnosis Date    Diverticulitis     Dystrophy, cornea     Environmental allergies     Family history of skin cancer     GERD (gastroesophageal reflux disease)     Hypotension     Ill-defined condition     Hyponatremia    Menopause     LMP-unknown    Multiple drug allergies     Osteoporosis     Dr. Jose Enrique Renee at [address] 05/2019    Sun-damaged skin     Vertebral compression [facility] [address] [address] 01/05/12    Vocal cord anomaly     vocal cord dysfunction per speech therapist       Past Surgical History:   Procedure Laterality Date    COLONOSCOPY N/A 7/7/2017    COL[facility]uterio [facility]OSCOPY     [facility] HX CATARACT REMOVAL  07/2012    HX GYN      D&C    HX MOHS PROCEDURES  04/11/2017    [facility] left cheek by Dr. Saman Theodore         Family History:   Problem Relation Age of Onset    Heart Disease Mother     Cancer Father 61        lung    Cataract Daughter         congenital cataracts    Heart Disease Daughter         fast heart rythmn Social History     Socioeconomic History    Marital status:      Spouse name: Not on file    Number of children: Not on file    Years of education: Not on file    Highest education level: Not on file   Occupational History    Not on file   Tobacco Use    Smoking status: Never Smoker    Smokeless tobacco: Never Used   Substance and Sexual Activity    Alcohol use: No    Drug use: No    Sexual activity: Not on file   Other Topics Concern    Not on file   Social History Narrative    Not on file     Social Determinants of Health     Financial Resource Strain:     Difficulty of Paying Living Expenses: Not on file   Food Insecurity:     Worried About Running Out of Food in the Last Year: Not on file    Wally of Food in the Last Year: Not on file   Transportation Needs:     Lack of Transportation (Medical): Not on file    Lack of Transportation (Non-Medical):  Not on file   Physical Activity:     Days of Exercise per Week: Not on file    Minutes of Exercise per Session: Not on file   Stress:     Feeling of Stress : Not on file   Social Connections:     Frequency of Communication with Friends and Family: Not on file    Frequency of Social Gatherings with Friends and Family: Not on file    Attends Hindu Services: Not on file    Active Member of [address] [place] [address] [employer] or Organizations: Not on file    Attends Club or Organization Meetings: Not on file    Marital Status: Not on file   Intimate Partner Violence:     Fear of Current or Ex-Partner: Not on file    Emotionally Abused: Not on file    Physically Abused: Not on file    Sexually Abused: Not on file   Housing Stability:     Unable to Pay for Housing in the Last Year: Not on file    Number of [place] in the Last Year: Not on file    Unstable Housing in the Last Year: Not on file         ALLERGIES: Clindamycin, Ketek [telithromycin], Levaquin [levofloxacin], Nexium [esomeprazole magnesium], Sudafed [pseudoephedrine hcl], Voltaren [diclofenac sodium], Xyzal [levocetirizine], Ceftin [cefuroxime axetil], Cortisone, Methylprednisolone, Mobic [meloxicam], Vibramycin [doxycycline calcium], Afrin [pseudoephedrine sulfate], Antihistamine-1, Azithromycin, Bactrim [sulfamethoprim ds], Biaxin [clarithromycin], Ciprofloxacin, Codeine, Doxycycline, Flagyl [metronidazole], Gabapentin, Pcn [penicillins], and Restasis [cyclosporine]    Review of Systems   Constitutional: Negative. HENT: Negative. Eyes: Negative. Respiratory: Negative. Negative for choking and shortness of breath. Cardiovascular: Negative. Negative for chest pain. Gastrointestinal: Negative. Negative for bowel incontinence, nausea and vomiting. Endocrine: Negative. Genitourinary: Negative. Negative for bladder incontinence. Musculoskeletal: Negative. Skin: Negative. Allergic/Immunologic: Negative. Neurological: Positive for numbness and loss of balance. Negative for headaches. Hematological: Negative. Psychiatric/Behavioral: Negative. Negative for confusion. Vitals:    06/03/22 0224   BP: 126/73   Pulse: 80   Resp: 16   Temp: 98.2 °F (36.8 °C)   SpO2: 98%   Weight: 53.1 kg (117 lb 1 oz)            Physical Exam  Vitals and nursing note reviewed. Constitutional:       General: She is not in acute distress. Appearance: Normal appearance. HENT:      Head: Normocephalic and atraumatic. Right Ear: External ear normal.      Left Ear: External ear normal.      Nose: Nose normal.   Eyes:      Extraocular Movements: Extraocular movements intact. Conjunctiva/sclera: Conjunctivae normal.   Cardiovascular:      Rate and Rhythm: Normal rate. Pulses: Normal pulses. Radial pulses are 2+ on the right side and 2+ on the left side. Heart sounds: Normal heart sounds. Pulmonary:      Effort: Pulmonary effort is normal.      Breath sounds: Normal breath sounds. Chest:      Chest wall: No deformity or tenderness.    Abdominal: General: Abdomen is flat. There is no distension. Tenderness: There is no abdominal tenderness. Musculoskeletal:         General: No deformity or signs of injury. Normal range of motion. Cervical back: Normal range of motion and neck supple. No tenderness. Skin:     General: Skin is warm and dry. Capillary Refill: Capillary refill takes less than 2 seconds. Neurological:      Mental Status: She is alert and oriented to person, place, and time. Cranial Nerves: No cranial nerve deficit. Sensory: No sensory deficit. Motor: No weakness. Coordination: Coordination abnormal.      Gait: Gait normal.      Comments: Mild leg drift   Psychiatric:         Attention and Perception: Attention normal.         Mood and Affect: Mood normal.         Behavior: Behavior normal.          [facility]  ED Course as of 06/03/22 1703   Fri Jun 03, 2022 0243 Discussed patient with teleneurology. Right-sided numbness on the leg, arm and face. Some gait instability. Blood glucose 92. Blood pressure 216 systolic. No history of stroke or any significant risk factors besides age. [AL]   0259 Teleneuro. Likely thalamic stroke. plavix 300, ASA 81 now. Then daily 81 ASA and 3 weeks of plavix at 75 for 2-3 weeks. MRI [AL]   4113 EKG interpretation:   Rhythm: normal sinus rhythm, RBBB, and PAC's; and regular . Rate (approx.): 68; Axis: normal; Intervals: normal ; ST/T wave: normal; EKG documented and interpreted by Jacinto Almonte.  Vivek Arango MD, Emergency Medicine.     [AL]      ED Course User Index  [AL] Mesha Mckenzie MD     LABORATORY RESULTS:  Labs Reviewed   METABOLIC PANEL, COMPREHENSIVE - Abnormal; Notable for the following components:       Result Value    Sodium 132 (*)     Anion gap 3 (*)     A-G Ratio 1.0 (*)     All other components within normal limits   TSH 3RD GENERATION - Abnormal; Notable for the following components:    TSH 5.91 (*)     All other components within normal limits   LIPID PANEL - Abnormal; Notable for the following components:    Cholesterol, total 223 (*)     LDL, calculated 133.4 (*)     All other components within normal limits   CBC WITH AUTOMATED DIFF   PROTHROMBIN TIME + INR   TROPONIN-HIGH SENSITIVITY   HEMOGLOBIN A1C WITH EAG   GLUCOSE, POC       IMAGING RESULTS:  XR CHEST PORT   Final Result      No acute process. CT CODE NEURO HEAD WO CONTRAST   Final Result   No acute intracranial abnormality. MEDICATIONS GIVEN:  Medications   aspirin chewable tablet 81 mg (81 mg Oral Given 6/3/22 6885)   clopidogreL (PLAVIX) tablet 300 mg (300 mg Oral Given 6/3/22 0257)       Differential diagnosis: CVA, right heminumbness    ED physician interpretation of imaging: CT head without acute bleeds  ED physician interpretation of EKG: No STEMI. See my interpretation EKG and ED course above. ED physician interpretation of laboratory results: Lab work without critical values requiring emergency medical intervention. Mild elevation in TSH. MDM: Patient is a 59-year-old female presented ED with right-sided numbness and possible right leg drift concerning for CVA with tier 2 code stroke called based on timing. Teleneurology evaluate the patient. Not a tPA candidate. They recommend hospital admission for MRI, further stroke work-up. 81 mg aspirin given, Plavix 300 load given. Hospital service contacted patient admitted for further treatment evaluation. DISPOSITION: Admitted    Perfect Serve Consult for Admission  5:03 AM    ED Room Number: ER05/05  Patient Name and age: Nay Gillespie 80 y.o.  female  Working Diagnosis:   1.  Cerebrovascular accident (CVA), unspecified mechanism ([address]        COVID-19 Suspicion:  no  Sepsis present:  no  Reassessment needed: no  Code Status:  Full Code  Readmission: no  Isolation Requirements:  no  Recommended Level of Care:  med/surg  Department:  [facility] Adult ED - 21     Other: Patient presented to the ED with right-sided numbness, tier 2 code stroke, telemetry neuro recommended aspirin, Plavix and admission for full stroke work-up. Perico Mitchell.  Florencia Purvis MD      Procedures

## 2022-06-03 NOTE — H&P
[facility]  HISTORY AND PHYSICAL    Name:  Sarah Child  MR#:  [id number]  :  1940  ACCOUNT #:  [de-identified]  ADMIT DATE:  2022      The patient was seen, evaluated, and admitted by me on 2022. PRIMARY CARE PHYSICIAN:  Fabian Noel MD    SOURCE OF INFORMATION:  The patient and review of ED and old electronic medical record. CHIEF COMPLAINT:  Right-sided numbness. HISTORY OF PRESENT ILLNESS:  This is an 70-year-old woman with past medical history significant for osteoporosis and degenerative disc disease was in her usual state of health until 7 a.m. yesterday when the patient developed right-sided numbness. The patient stated that the numbness started in the right leg and progressed to involve the entire right side including numbness of the right side of the face. The patient did not come to the emergency room immediately, but because the symptoms did not resolve, she decided to come to the emergency room for further evaluation. She was also unsteady on her feet. The patient was brought to the emergency room as code stroke. When the patient arrived at the emergency room, CT scan of the head without contrast was obtained. This did not show any acute pathology. The patient is clearly outside the tPA window because her symptoms started almost 24 hours earlier. The patient was referred to the hospitalist service for evaluation for admission. The patient had a similar presentation and was admitted to this hospital in 2001. Evaluation for stroke at that time was negative. PAST MEDICAL HISTORY:  Osteoporosis, degenerative disc disease, and chronic pain syndrome. ALLERGIES:  THE PATIENT IS ALLERGIC TO CLINDAMYCIN, LEVAQUIN, NEXIUM, MAGNESIUM, CODEINE, DOXYCYCLINE, FLAGYL, AND NEURONTIN. MEDICATIONS:  1. Tylenol 325 mg every 8 hours as needed for pain. 2.  Multivitamin 1 tablet daily.   3.  Inderal 10 mg four times daily as needed for anxiety. 4.  MiraLax 17 g daily as needed. 5.  Protonix 40 mg daily. FAMILY HISTORY:  This was reviewed. Her father had lung cancer. Mother had heart disease. PAST SURGICAL HISTORY:  This is significant for bilateral cataract extraction. SOCIAL HISTORY:  No history of alcohol or tobacco abuse. REVIEW OF SYSTEMS:  HEAD, EYES, EARS, NOSE AND THROAT:  No headache, no dizziness, no blurring of vision, and no photophobia. RESPIRATORY SYSTEM:  No cough, no shortness of breath, and no hemoptysis. CARDIOVASCULAR SYSTEM:  No chest pain, no orthopnea, and no palpitation. GASTROINTESTINAL SYSTEM:  No nausea or vomiting. No diarrhea and no constipation. GENITOURINARY SYSTEM:  No dysuria, no urgency, and no frequency. All other systems are reviewed and they are negative. PHYSICAL EXAMINATION:  GENERAL APPEARANCE:  The patient appeared ill and in moderate distress. VITAL SIGNS:  On arrival at the emergency room; temperature 98.2, pulse 80, respiratory rate 16, blood pressure 126/73, and oxygen saturation 98% on room air. HEAD:  Normocephalic, atraumatic. EYES:  Normal eye movement. No redness, no drainage, and no discharge. EARS:  Normal external ears with no obvious drainage. NOSE:  No deformity and no drainage. MOUTH AND THROAT:  No visible oral lesion. NECK:  Neck is supple. No JVD and no thyromegaly. CHEST:  Clear breath sounds. No wheezing and no crackles. HEART:  Normal S1 and S2, regular. No clinically appreciable murmur. ABDOMEN:  Soft, nontender. Normal bowel sounds. CNS:  Alert and oriented x3. No gross focal neurological deficit. EXTREMITIES:  No edema. Pulses 2+ bilaterally. MUSCULOSKELETAL SYSTEM:  No obvious joint deformity and swelling. SKIN:  No active skin lesions seen in the exposed part of the body. PSYCHIATRY:  Normal mood and affect. LYMPHATIC SYSTEM:  No cervical lymphadenopathy. DIAGNOSTIC DATA:  The EKG shows sinus rhythm.   No significant ST and T-waves abnormalities. The CT scan of the head without contrast, no acute intracranial abnormalities. Chest x-ray, no acute process. LABORATORY DATA:  Hematology; WBC 5.2, hemoglobin 12.9, hematocrit 38.8, and platelets 670. Lipid profile; total cholesterol 223, triglycerides 78, HDL 74, and .4. Chemistry; sodium 132, potassium 4.1, chloride 99, CO2 of 30, glucose 92, BUN 12, creatinine 0.77, calcium 9.5, total bilirubin 0.5, ALT 21, and AST 28. Alkaline phosphatase 74, total protein 8.0, albumin level 4.0, and globulin 4.0.  TSH 5.91. Coagulation profile; INR 1.0 and PT 10.5. Cardiac profile, troponin high-sensitivity of 4. ASSESSMENT:  1. Suspected acute cerebrovascular accident. 2.  Hyponatremia. 3.  Subclinical hypothyroidism. 4.  Dyslipidemia. PLAN:  1. Suspected acute CVA. We will admit the patient for further evaluation and treatment. We will obtain MRI and MRA of the brain, carotid Doppler of the neck, as well as echocardiogram.  Lipid profile has already been checked. We will check hemoglobin A1c level if one has not been checked recently. Inpatient Neurology consult will be requested to assist in further evaluation and treatment. We will check F39 and folic acid level. We will also check C-reactive protein level to evaluate the patient for systemic inflammation. We will start the patient on aspirin and Lipitor if the patient is not allergic to this medication. The patient has history of multiple drug allergies. We will await further recommendation from the inpatient neurologist.  2.  Hyponatremia. This is most likely due to volume depletion. This is mild. We will carry out fluid therapy and monitor the patient's sodium level. 3.  Subclinical hypothyroidism. We will check T3 and T4 level. 4.  Dyslipidemia. We will start the patient on Lipitor. 5.  Other issues. Code status, the patient is a full code.   We will place the patient on Lovenox for DVT prophylaxis. FUNCTIONAL STATUS PRIOR TO ADMISSION:  The patient came from home. The patient is ambulatory with no assistive device. COVID PRECAUTION:  The patient was wearing a face mask. I was wearing a face mask and gloves for this patient's encounter.       Amy Sesay MD      [id number]  D:  06/03/2022 6:37  T:  06/03/2022 7:48  JOB #:  [id number]  CC:  Beronica Lebron MD

## 2022-06-03 NOTE — ED NOTES
Bedside shift change report given to Kimberlee Andrews RN (oncoming nurse) by Penny Peter RN (offgoing nurse). Report included the following information SBAR, ED Summary, Intake/Output, MAR and Recent Results. Follow up or direct to GI

## 2022-06-03 NOTE — ED TRIAGE NOTES
Triage: Pt arrives ambulatory from home with CC of right sided numbness that started at 7am 6/2. Pt reports the numbness started in her leg and then progressed to her arm and face. She also reports that she feels as though her gait is unsteady. Denies hx of stroke or TIA. Denies anticoagulation. Denies changes to speech or vision. Denies dizziness or headache.

## 2022-06-03 NOTE — ED NOTES
Shift change report from [place], [address]: Hospitalist at bedside. Pt reporting new LLE tingling/numbness now along with RLE weakness. MD aware. Pt states chronic hx of back issues. 0930: Pt to MRI. 1450: Reviewed discharge instructions with patient and patient's DIL. Pt verbalized understanding. Per MD, pt okay to be d/flor without getting echo.

## 2022-06-03 NOTE — PROGRESS NOTES
OCCUPATIONAL THERAPY EVALUATION/DISCHARGE  Patient: Maddie Ulloa (1 y.o. female)  Date: 6/3/2022  Primary Diagnosis: Acute CVA (cerebrovascular accident) [facility]) [I63.9]       Precautions:  Fall    ASSESSMENT  Based on the objective data described below, the patient presents with near baseline ADL performance s/p admission for CVA. Noted patient CT negative, MRI pending at time of evaluation. Patient reporting improvement of symptoms in RUE/RLE but reporting new numbness in LLE (toes to knee). Patient ADLs limited by impaired balance, impaired sensation and mildly impaired insight into deficits. Patient lives alone at baseline in 1 level apartment with 8 MABEL. Patient received in supine, supervision for bed mobility. Patient required CGA for functional mobility with no AD, and supervision with RW. Patient able to gabi shoes IND sitting EOB. patient completed toileting and handwashing at sink with supervision 2/2 lines and leads only. Patient left working with PT. No acute OT needs and no f/u needs at this time. Will sign off. Current Level of Function (ADLs/self-care): CGA-IND with ADLs, CGA-supervision for mobility    Functional Outcome Measure: The patient scored Total A-D  Total A-D (Motor Function): 66/66 on the Fugl-Jennings Assessment which is indicative of no impairment in upper extremity functional status. Other factors to consider for discharge: daughter out of town, lives alone, balance impaired     PLAN :  Recommendation for discharge: (in order for the patient to meet his/her long term goals)  No skilled occupational therapy/ follow up rehabilitation needs identified at this time. This discharge recommendation:  Has been made in collaboration with the attending provider and/or case management    IF patient discharges home will need the following DME: patient owns DME required for discharge       SUBJECTIVE:   Patient stated I didn't even think about that.  re: handling of hot/cold/sharp objects    OBJECTIVE DATA SUMMARY:   HISTORY:   Past Medical History:   Diagnosis Date    Diverticulitis     Dystrophy, cornea     Environmental allergies     Family history of skin cancer     GERD (gastroesophageal reflux disease)     Hypotension     Ill-defined condition     Hyponatremia    Menopause     LMP-unknown    Multiple drug allergies     Osteoporosis     Dr. Claudio Camargo at [address] 05/2019    Sun-damaged skin     Vertebral compression fracture ([facility] [address] 01/05/12    Vocal cord anomaly     vocal cord dysfunction per speech therapist     Past Surgical History:   Procedure Laterality Date    COLONOSCOPY N/A 7/7/2017    COLON[facility] performed [facility]ona MD at [facility] ENDOSCOPY     [address] HX CATARACT REMOVAL  07/2012    HX GYN      D&C    HX MOHS PROCEDURES  04/11/2017    [address] left cheek by Dr. Makenna Mcdonald       Prior Level of Function/Environment/Context: lives alone in 1 level apartment, was IND and driving PTA. Daughter in area who is supportive and patient reports having helpful neighbors. Expanded or extensive additional review of patient history:     Home Situation  Home Environment: Apartment  # Steps to Enter: 8  Rails to Enter: Yes  One/Two Story Residence: One story  Living Alone: Yes  Support Systems: Child(mike) (daughter lives in area)  Patient Expects to be Discharged to[de-identified] Home  Current DME Used/Available at Home: Keira Castellani, rolling,Grab bars,Cane, straight  Tub or Shower Type: Tub/Shower combination    Hand dominance: Right    EXAMINATION OF PERFORMANCE DEFICITS:  Cognitive/Behavioral Status:  Neurologic State: Alert  Orientation Level: Oriented X4  Cognition: Appropriate for age attention/concentration; Follows commands  Perception: Appears intact  Perseveration: No perseveration noted  Safety/Judgement: Awareness of environment; Fall prevention    Skin: appears intact    Edema: none noted in BUEs    Hearing:       Vision/Perceptual:    Tracking: Able to track stimulus in all quadrants w/o difficulty    Diplopia: No    Acuity: Within Defined Limits;Able to read employee name [email or web address] without difficulty    Corrective Lenses: Glasses    Range of Motion:  In BUEs  AROM: Within functional limits  PROM: Within functional limits    Strength: In BUEs  Strength: Within functional limits    Coordination:  Coordination: Within functional limits  Fine Motor Skills-Upper: Left Intact; Right Intact    Gross Motor Skills-Upper: Left Intact; Right Intact    Tone & Sensation:  In BUEs  Tone: Normal  Sensation: Impaired (impaired sensation in R hand, RLE and now LLE foot to knee)     Balance:  Sitting: Intact  Standing: Impaired; Without support  Standing - Static: Good  Standing - Dynamic : Good;Fair    Functional Mobility and Transfers for ADLs:  Bed Mobility:  Supine to Sit: Supervision  Sit to Supine: Supervision  Scooting: Supervision    Transfers:  Sit to Stand: Contact guard assistance  Stand to Sit: Contact guard assistance  Toilet Transfer : Stand-by assistance    ADL Assessment:  Feeding: Independent    Oral Facial Hygiene/Grooming: Independent    Bathing: Independent    Upper Body Dressing: Independent    Lower Body Dressing: Independent    Toileting: Independent    ADL Intervention and task modifications:     Grooming  Position Performed: Standing  Washing Hands: Independent    Lower Body Dressing Assistance  Shoes with Cloth Laces: Independent  Leg Crossed Method Used: Yes  Position Performed: Seated edge of bed  Cues: Don    Toileting  Toileting Assistance: Independent  Bladder Hygiene: Independent  Bowel Hygiene: Independent  Clothing Management: Independent    Cognitive Retraining  Safety/Judgement: Awareness of environment; Fall prevention     Patient educated on safety with regards to hot/cold/sharp 2/2 impaired sensation - patient verbalized understanding and appreciation for information.      Functional Measure:  Fugl-Jennings Assessment of Motor Recovery after Stroke: Reflex Activity  Flexors/Biceps/Fingers: Can be elicited  Extensors/Triceps: Can be elicited  Reflex Subtotal: 4    Volitional Movement Within Synergies  Shoulder Retraction: Full  Shoulder Elevation: Full  Shoulder Abduction (90 degrees): Full  Shoulder External Rotation: Full  Elbow Flexion: Full  Forearm Supination: Full  Shoulder Adduction/Internal Rotation: Full  Elbow Extension: Full  Forearm Pronation: Full  Subtotal: 18    Volitional Movement Mixing Synergies  Hand to Lumbar Spine: Full  Shoulder Flexion (0-90 degrees): Full  Pronation-Supination: Full  Subtotal: 6    Volitional Movement With Little or No Synergy  Shoulder Abduction (0-90 degrees): Full  Shoulder Flexion ( degrees): Full  Pronation/Supination: Full  Subtotal : 6    Normal Reflex Activity  Biceps, Triceps, Finger Flexors: Full  Subtotal : 2    Upper Extremity Total   Upper Extremity Total: 36    Wrist  Stability at 15 Degree Dorsiflexion: Full  Repeated Dorsiflexion/ Volar Flexion: Full  Stability at 15 Degree Dorsiflexion: Full  Repeated Dorsiflexion/ Volar Flexion: Full  Circumduction: Full  Wrist Total: 10    Hand  Mass Flexion: Full  Mass Extension: Full  Grasp A: Full  Grasp B: Full  Grasp C: Full  Grasp D: Full  Grasp E: Full  Hand Total: 14    Coordination/Speed  Tremor: None  Dysmetria: None  Time: <1s  Coordination/Speed Total : 6    Total A-D  Total A-D (Motor Function): 66/66     This is a reliable/valid measure of arm function after a neurological event. It has established value to characterize functional status and for measuring spontaneous and therapy-induced recovery; tests proximal and distal motor functions. Fugl-Jennings Assessment - UE scores recorded between five and 30 days post neurologic event can be used to predict UE recovery at six months post neurologic event.   Severe = 0-21 points   Moderately Severe = 22-33 points   Moderate = 34-47 points   Mild = 48-66 points  CHARISSA Jacobs, [place], DULCE, & RVAI [place] (1992). Measurement of motor recovery after stroke: Outcome assessment and sample size requirements. Stroke, 23, pp. 1724-2426.   ------------------------------------------------------------------------------------------------------------------------------------------------------------------  MCID:  Stroke:   Gloria Woo et al, 2001; n = 171; mean age 79 (6) years; assessed within 16 (12) days of stroke, Acute Stroke)  FMA Motor Scores from Admission to Discharge   10 point increase in FMA Upper Extremity = 1.5 change in discharge FIM   10 point increase in FMA Lower Extremity = 1.9 change in discharge FIM  MDC:   Stroke:   Camilla Arce et al, 2008, n = 14, mean age = 59.9 (14.6) years, assessed on average 14 (6.5) months post stroke, Chronic Stroke)   FMA = 5.2 points for the Upper Extremity portion of the assessment     Occupational Therapy Evaluation Charge Determination   History Examination Decision-Making   LOW Complexity : Brief history review  LOW Complexity : 1-3 performance deficits relating to physical, cognitive , or psychosocial skils that result in activity limitations and / or participation restrictions  LOW Complexity : No comorbidities that affect functional and no verbal or physical assistance needed to complete eval tasks       Based on the above components, the patient evaluation is determined to be of the following complexity level: LOW   Pain Rating:  No pain reported    Activity Tolerance:   Good    After treatment patient left in no apparent distress:    working with PT in [place]    COMMUNICATION/EDUCATION:   The patients plan of care was discussed with: Physical therapist and Registered nurse. Patient was educated regarding her deficit(s) of RUE/RLE numbess as this relates to her diagnosis of CVA workup. She demonstrated Good understanding as evidenced by verbal responses.       Thank you for this referral.  Jose Maria Andersen, OT  Time Calculation: 23 mins

## 2022-06-03 NOTE — Clinical Note
Status[de-identified] INPATIENT [101]   Type of Bed: Neuro/Stroke [9]   Inpatient Hospitalization Certified Necessary for the Following Reasons: 3.  Patient receiving treatment that can only be provided in an inpatient setting (further clarification in H&P documentation)   Admitting Diagnosis: Acute CVA (cerebrovascular accident) [facility]) [[id number]]   Admitting Physician: Sushant Gomez   Attending Physician: Sharita Fuller   Estimated Length of Stay: 3-4 Midnights   Discharge Plan[de-identified] Home with Office Follow-up

## 2022-06-03 NOTE — PROGRESS NOTES
Transition of Care Plan   RUR: 11%   Disposition: The disposition plan is home with [facility] PT/OT; placed on AVS   F/U with PCP/Specialist     Transport: friend      Reason for Admission:  Acute CVA                     RUR Score:    11%                 Plan for utilizing home health:    Recommended; referral sent      PCP: First and Last name:  Vani Tellez MD     Name of Practice:    Are you a current patient: Yes/No:    Approximate date of last visit:    Can you participate in a virtual visit with your PCP:                     Current Advanced Directive/Advance Care Plan: Full Code      Healthcare Decision Maker:       Primary Decision MakeKaterina [place] - Daughter - [phone]    Secondary Decision Maker: Judson Norman - Daughter - [phone]                  Transition of Care Plan:                      CRM met with patient, introduced self, explained role, verified demographics, and offered assistance. The patient lives alone in a home with 8 steps to enter. The patient is independent in her ADL's/IADL's and does have a walker, cane, and bed side commode. The patient uses [facility] for her prescriptions. Medicare pt has received, reviewed, and signed 1st IM letter informing them of their right to appeal the discharge. Signed copy has been placed on pt bedside chart. Care Management Interventions  PCP Verified by CM: Yes  Palliative Care Criteria Met (RRAT>21 & CHF Dx)?: No  Mode of Transport at Discharge:  Other (see comment) (friend)  Transition of Care Consult (CM Consult): Discharge Planning  MyChart Signup: No  Discharge Durable Medical Equipment: No  Health Maintenance Reviewed: Yes  Physical Therapy Consult: Yes  Occupational Therapy Consult: Yes  Speech Therapy Consult: Yes  Support Systems: Child(mike)  Confirm Follow Up Transport: Family  The Patient and/or Patient Representative was Provided with a Choice of Provider and Agrees with the Discharge Plan?: Yes  Freedom of Choice List was Provided with Basic Dialogue that Supports the Patient's Individualized Plan of Care/Goals, Treatment Preferences and Shares the Quality Data Associated with the Providers?: Yes  [place] Resource Information Provided?: No  Discharge Location  Patient Expects to be Discharged to[de-identified] Home with home health    11:39 AM  Osmin Johnson, CRM

## 2022-06-04 ENCOUNTER — HOME CARE VISIT (OUTPATIENT)
Dept: HOME HEALTH SERVICES | Facility: HOME HEALTH | Age: 82
End: 2022-06-04

## 2022-06-04 LAB — T3 SERPL-MCNC: 95 NG/DL (ref 71–180)

## 2022-06-05 NOTE — CONSULTS
[address]    Name:  Yari Woodall  MR#:  [id number]  :  1940  ACCOUNT #:  [de-identified]  DATE OF SERVICE:  2022    NEUROLOGY CONSULTATION    HISTORY OF PRESENT ILLNESS:  This is an 70-year-old right-handed female who was admitted today with complaints of paresthesias. The patient reports that on  at 07:15 a.m., her right leg felt numb. She was able to continue about her usual activities, did get to see her PCP and was told that if anything changes or worsens, she should go to the emergency department. Later on in the day, her entire right side went numb including her face. This occurred around midnight or 01:00 a.m., states that this is what woke her up from sleep. She felt unsteady, but denies any weakness. Since arrival in the ED, her right-sided numbness has improved somewhat, but it is still there and now her left leg is going numb. CT of the head was unremarkable. MRI of the brain shows chronic ischemic white matter disease. MRA of the head is negative. Carotid Dopplers are negative. Incidental thyroid nodules are seen. The patient denies any prior history of similar symptoms, however, she has been seen at [facility] on two other occasions for similar complaints, first in , seen by Dr. Juan Rodriguez, had unexplained bilateral lower extremity numbness, and again in 2021 and seen by Dr. Earl Murguia, at that time she only had numbness in her left leg. She denies any headache associated with these symptoms, but did have a headache in the ED today because she did not have any coffee, when she had a coffee, it went away. No other focal deficits or complaints. PAST MEDICAL HISTORY:  1.  24 drug allergies listed. 2.  Diverticulitis. 3.  Gastroesophageal reflux disease. 4.  Corneal dystrophy. 5.  Osteoporosis. 6.  Shingles. 7.  Vertebral compression fracture. 8.  Vocal cord abnormality. 9.  Carpal tunnel syndrome bilaterally.   10.  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 11.  Status post cataract surgery. 12.  Degenerative disc disease. MEDICATIONS:  1. She was started on Decadron 05/20/2022. The patient had an allergic reaction to a steroid injection for coccyx pain. 2.  Protonix. 3.  Inderal.  4.  Tylenol. 5.  Multivitamin. 6.  Citrucel. 7.  MiraLax. 8.  Colace. 9.  Vitamin D. 10.  Lotemax eyedrops. ALLERGIES:  THERE ARE 24 ALLERGIES LISTED, I REVIEWED THEM. SOCIAL HISTORY:  She is . Lives in [address] [facility]. She used to work for [employer]. No tobacco or alcohol or drug use. FAMILY HISTORY:  Mom, heart disease. Dad, lung cancer. Daughter, congenital cataracts and heart disease. PHYSICAL EXAMINATION:  GENERAL:  She is a well-nourished, well-developed, healthy-appearing elderly female sitting in a chair beside the bed, finishing her breakfast, in no distress. VITAL SIGNS: /83, pulse 68, respiratory rate 16, satting 94% on room air, temperature is 98.3, BMI of 21.4. HEART:  Regular rate and rhythm without murmurs. Her carotids are 2+. No bruits. EXTREMITIES:  Warm without edema. NEUROLOGIC:  Mental Status:  Alert, oriented x4. Speech and language intact. Attention, memory and fund of knowledge are appropriate. Cranial nerves shows no facial asymmetry or ptosis. Extraocular eye movements intact without diplopia or nystagmus. Visual fields full. Pupils equally round, and reactive. Tongue midline. Palate elevated symmetrically. Trapezius and sternocleidomastoid are 5/5. Motor exam 5/5 throughout. No pronator drift. No tremor. Sensory exam, had patchy decreased sensation in the lower extremities. It was inconsistent in no particular dermatomal pattern. Coordination:  Intact to finger-to-nose, rapid alternating movements. Reflexes symmetric. Gait not assessed. STUDIES AND REPORTS:  Reviewed above. In addition, her LDL is 133.4, hemoglobin A1c 5.7.   Sodium 132, TSH 5.91. B12, 841. ASSESSMENT AND PLAN:  This is an 70-year-old right-handed female presenting with paresthesias of unclear etiology not consistent with transient ischemic attack or stroke without any findings on exam suggestive of myelopathy or radiculopathy with two prior presentations starting back in 2012 for similar symptoms without etiology found on prior admissions either. She does have hyperlipidemia and would benefit from a statin, but I am hesitant to start her on any medication given her 24 drug allergies including an allergy to steroid injection for which she received steroids as a treatment. I will defer to her PCP. She does not need an echocardiogram.  This was communicated to the hospitalist caring for her.       MD CHRISTINE [id number]  D:  06/04/2022 18:11  T:  06/04/2022 21:56  JOB #:  [id number]

## 2022-06-06 ENCOUNTER — TELEPHONE (OUTPATIENT)
Dept: ORTHOPEDIC SURGERY | Age: 82
End: 2022-06-06

## 2022-06-06 LAB
LEFT CCA DIST DIAS: 20.4 CM/S
LEFT CCA DIST SYS: 64.5 CM/S
LEFT CCA PROX DIAS: 17.5 CM/S
LEFT CCA PROX SYS: 55.9 CM/S
LEFT ECA DIAS: 6.1 CM/S
LEFT ECA SYS: 43.8 CM/S
LEFT ICA DIST DIAS: 21 CM/S
LEFT ICA DIST SYS: 51.7 CM/S
LEFT ICA MID DIAS: 25.3 CM/S
LEFT ICA MID SYS: 84.3 CM/S
LEFT ICA PROX DIAS: 12.5 CM/S
LEFT ICA PROX SYS: 48.1 CM/S
LEFT ICA/CCA SYS: 1.3 NO UNITS
LEFT VERTEBRAL DIAS: 9 CM/S
LEFT VERTEBRAL SYS: 28.3 CM/S
RIGHT CCA DIST DIAS: 11.9 CM/S
RIGHT CCA DIST SYS: 47.8 CM/S
RIGHT CCA PROX DIAS: 14.7 CM/S
RIGHT CCA PROX SYS: 59.1 CM/S
RIGHT ECA DIAS: 7.2 CM/S
RIGHT ECA SYS: 45 CM/S
RIGHT ICA DIST DIAS: 18.8 CM/S
RIGHT ICA DIST SYS: 63.1 CM/S
RIGHT ICA MID DIAS: 19.7 CM/S
RIGHT ICA MID SYS: 52 CM/S
RIGHT ICA PROX DIAS: 9.2 CM/S
RIGHT ICA PROX SYS: 31.9 CM/S
RIGHT ICA/CCA SYS: 1.3 NO UNITS
RIGHT VERTEBRAL DIAS: 11.1 CM/S
RIGHT VERTEBRAL SYS: 30.3 CM/S

## 2022-06-06 NOTE — DISCHARGE SUMMARY
Discharge Summary       PATIENT ID: Edwina Tineo  MRN: [id number]   YOB: 1940    DATE OF ADMISSION: 6/3/2022  2:34 AM    DATE OF DISCHARGE: 6/6/22   PRIMARY CARE PROVIDER: Paula Benson MD     ATTENDING PHYSICIAN: Robert Ortiz  DISCHARGING PROVIDER: Analisa Grullon MD    To contact this individual call [phone] and ask the  to page. If unavailable ask to be transferred the Adult Hospitalist Department. CONSULTATIONS: IP CONSULT TO NEUROLOGY    PROCEDURES/SURGERIES: * No surgery found *    DISCHARGE DIAGNOSES: Right-sided numbness of unclear etiology    This is an 80-year-old woman with past medical history significant for osteoporosis and degenerative disc disease was in her usual state of health until 7 a.m. yesterday when the patient developed right-sided numbness.  The patient stated that the numbness started in the right leg and progressed to involve the entire right side including of unclear etiology of the right side of the face.  The patient did not come to the emergency room immediately, but because the symptoms did not resolve, she decided to come to the emergency room for further evaluation.  She was also unsteady on her feet.  The patient was brought to the emergency room as code stroke.  When the patient arrived at the emergency room, CT scan of the head without contrast was obtained.  This did not show any acute pathology.  The patient is clearly outside the tPA window because her symptoms started almost 24 hours earlier.  The patient was referred to the hospitalist service for evaluation for admission.  The patient had a similar presentation and was admitted to this hospital in 02/2001.  Evaluation for stroke at that time was negative.     [address] COURSE:   's seen and examined by neurology she had multiple similar complaints of bilateral lower extremity numbness initially 2012 and later 2021 followed by multiple neurologist in the past her MRI of the brain is negative for acute stroke so paresthesias was unclear etiology and not consistent with TIA CVA she was sent home with follow-up as an outpatient    DISCHARGE DIAGNOSES / PLAN:      D/c home    BMI: Body mass index is 21.41 kg/m². . This patient: Has a BMI within normal limits. PENDING TEST RESULTS:   At the time of discharge the following test results are still pending:      ADDITIONAL CARE RECOMMENDATIONS:        NOTIFY YOUR PHYSICIAN FOR ANY OF THE FOLLOWING:   Fever over 101 degrees for 24 hours. Chest pain, shortness of breath, fever, chills, nausea, vomiting, diarrhea, change in mentation, falling, weakness, bleeding. Severe pain or pain not relieved by medications, as well as any other signs or symptoms that you may have questions about. FOLLOW UP APPOINTMENTS:    Follow-up Information     Follow up With Specialties Details Why Contact Info    He Georges MD Internal Medicine Physician   [phone]      [address] Call Please call agency within 24 hours of discharge if you have not heard from them. [facility] [phone]             DIET: Cardiac Diet    ACTIVITY: Activity as tolerated    EQUIPMENT needed:     DISCHARGE MEDICATIONS:  Discharge Medication List as of 6/3/2022  2:18 PM      CONTINUE these medications which have NOT CHANGED    Details   triamcinolone acetonide (KENALOG) 0.1 % topical cream Apply  to affected area two (2) times daily as needed for Skin Irritation. use thin layer, Normal, Disp-80 g, R-1      pantoprazole sodium (PANTOPRAZOLE PO) Take 40 mg by mouth daily. , Historical Med      propranoloL (INDERAL) 10 mg tablet Take 1 Tab by mouth four (4) times daily as needed for Anxiety., Normal, Disp-60 Tab, R-6      acetaminophen (TYLENOL) 325 mg tablet Take 325 mg by mouth every eight (8) hours. , Historical Med      therapeutic multivitamin [facility]) tablet Take 1 Tab by mouth daily. , Historical Med      CALCIUM CITRATE (CITRACAL PO) Take 1 Tab by mouth two (2) times a day., Historical Med      polyethylene glycol (MIRALAX) 17 gram packet Take 17 g by mouth daily as needed., Historical Med      docusate sodium (COLACE) 100 mg capsule Take 100 mg by mouth two (2) times daily as needed., Historical Med      cholecalciferol, vitamin D3, 2,000 unit tab Take 1 Tab by mouth daily. , Historical Med      loteprednol etabonate (LOTEMAX) 0.5 % ophthalmic suspension Administer 1 Drop to both eyes daily. , Historical Med         STOP taking these medications       dexAMETHasone (Decadron) 6 mg tablet Comments:   Reason for Stopping:               DISPOSITION:  x  Home With:   OT  PT  HH  RN       Long term SNF/Inpatient Rehab    Independent/assisted living    Hospice    Other:       PATIENT CONDITION AT DISCHARGE:     Functional status    Poor     Deconditioned    x Independent      Cognition   x Lucid     Forgetful     Dementia      Catheters/lines (plus indication)    Glez     PICC     PEG    x None      Code status    x Full code     DNR      PHYSICAL EXAMINATION AT DISCHARGE:  General:          Alert, cooperative, no distress, appears stated age. HEENT:           Atraumatic, anicteric sclerae, pink conjunctivae                          No oral ulcers, mucosa moist, throat clear, dentition fair  Neck:               Supple, symmetrical  Lungs:             Clear to auscultation bilaterally. No Wheezing or Rhonchi. No rales. Chest wall:      No tenderness  No Accessory muscle use. Heart:              Regular  rhythm,  No  murmur   No edema  Abdomen:        Soft, non-tender. Not distended. Bowel sounds normal  Extremities:     No cyanosis. No clubbing,                            Skin turgor normal, Capillary refill normal  Skin:                Not pale. Not Jaundiced  No rashes   Psych:             Not anxious or agitated.   Neurologic:      Alert, moves all extremities, answers questions appropriately and responds to commands       CHRONIC MEDICAL DIAGNOSES:  Problem List as of 6/3/2022 Date Reviewed: 6/3/2022          Codes Class Noted - Resolved    Senile purpura ([facility] [address] ICD-10-CM: D69.2  ICD-9-CM: 287.2  5/23/2022 - Present        Abnormal EKG ICD-10-CM: R94.31  ICD-9-CM: 794.31  10/22/2021 - Present        * (Principal) Acute CVA (cerebrovascular accident) [facility]) ICD-10-CM: I63.9  ICD-9-CM: 434.91  2/16/2021 - Present        Numbness ICD-10-CM: R20.0  ICD-9-CM: 782.0  5/5/2020 - Present        Herpes zoster without complication ZRT-09-VH: N83.7  ICD-9-CM: 053.9  5/15/2019 - Present        Intractable pain ICD-10-CM: R52  ICD-9-CM: 780.96  5/15/2019 - Present        Hyponatremia ICD-10-CM: E87.1  ICD-9-CM: 276.1  5/16/2017 - Present        Multiple drug allergies ICD-10-CM: Z88.9  ICD-9-CM: V14.9  Unknown - Present        Environmental allergies ICD-10-CM: Z91.09  ICD-9-CM: V15.09  Unknown - Present        Osteoporosis ICD-10-CM: M81.0  ICD-9-CM: 733.00  12/23/2013 - Present        GERD (gastroesophageal reflux disease) ICD-10-CM: K21.9  ICD-9-CM: 530.81  12/23/2013 - Present        RESOLVED: TIA (transient ischemic attack) ICD-10-CM: G45.9  ICD-9-CM: 435.9  2/16/2021 - 3/14/2022        RESOLVED: Chest pain ICD-10-CM: R07.9  ICD-9-CM: 786.50  3/7/2016 - 5/13/2016        RESOLVED: RBBB ICD-10-CM: I45.10  ICD-9-CM: 426.4  3/7/2016 - 5/13/2016        RESOLVED: SOB (shortness of breath) ICD-10-CM: R06.02  ICD-9-CM: 786.05  3/7/2016 - 5/13/2016              Greater than 30  minutes were spent with the patient on counseling and coordination of care    Signed:   Gilma Camejo MD  6/6/2022  4:45 PM

## 2022-06-06 NOTE — TELEPHONE ENCOUNTER
Tyshawn Floresots  Calling to report that she now has numbness in both the left and right lower extremity. This started 10 days ago. She spoke with Dr. Gui Canales over the symptoms and he feels that the symptoms are not related to his Coccy injection. She describes her left side of the body as going completely numb. She went to the ER and was worked up for a stroke. This was negative and she passed her Neuro Exam. She was advised to make an apt with to see provider for her spine.      Have a wonderful day,     Suraj Sams RN, BSN  Registered Nurse to [address] [place] [address] [address] [place] [address]    Office: [phone]  Fax: [phone]

## 2022-06-07 NOTE — PROGRESS NOTES
I spoke with patient concerning incidental finding of thyroid nodules.   She has follow-up with pcp this week  Result also sent to Dr. Javan Calderon pcp

## 2022-06-09 PROBLEM — I63.9 ACUTE CVA (CEREBROVASCULAR ACCIDENT) (HCC): Status: RESOLVED | Noted: 2021-02-16 | Resolved: 2022-06-09

## 2022-06-15 ENCOUNTER — OFFICE VISIT (OUTPATIENT)
Dept: ORTHOPEDIC SURGERY | Age: 82
End: 2022-06-15

## 2022-06-15 VITALS — WEIGHT: 130 LBS | BODY MASS INDEX: 23.92 KG/M2 | HEIGHT: 62 IN

## 2022-06-15 DIAGNOSIS — G89.29 CHRONIC BILATERAL LOW BACK PAIN WITH LEFT-SIDED SCIATICA: Primary | ICD-10-CM

## 2022-06-15 DIAGNOSIS — M48.061 SPINAL STENOSIS OF LUMBAR REGION WITHOUT NEUROGENIC CLAUDICATION: ICD-10-CM

## 2022-06-15 DIAGNOSIS — M54.42 CHRONIC BILATERAL LOW BACK PAIN WITH LEFT-SIDED SCIATICA: Primary | ICD-10-CM

## 2022-06-15 DIAGNOSIS — M48.02 CERVICAL STENOSIS OF SPINE: ICD-10-CM

## 2022-06-15 PROCEDURE — G8420 CALC BMI NORM PARAMETERS: HCPCS | Performed by: PHYSICIAN ASSISTANT

## 2022-06-15 PROCEDURE — G8432 DEP SCR NOT DOC, RNG: HCPCS | Performed by: PHYSICIAN ASSISTANT

## 2022-06-15 PROCEDURE — 1101F PT FALLS ASSESS-DOCD LE1/YR: CPT | Performed by: PHYSICIAN ASSISTANT

## 2022-06-15 PROCEDURE — 1090F PRES/ABSN URINE INCON ASSESS: CPT | Performed by: PHYSICIAN ASSISTANT

## 2022-06-15 PROCEDURE — 99213 OFFICE O/P EST LOW 20 MIN: CPT | Performed by: PHYSICIAN ASSISTANT

## 2022-06-15 PROCEDURE — G8536 NO DOC ELDER MAL SCRN: HCPCS | Performed by: PHYSICIAN ASSISTANT

## 2022-06-15 PROCEDURE — 1123F ACP DISCUSS/DSCN MKR DOCD: CPT | Performed by: PHYSICIAN ASSISTANT

## 2022-06-15 PROCEDURE — G8427 DOCREV CUR MEDS BY ELIG CLIN: HCPCS | Performed by: PHYSICIAN ASSISTANT

## 2022-06-15 PROCEDURE — 1111F DSCHRG MED/CURRENT MED MERGE: CPT | Performed by: PHYSICIAN ASSISTANT

## 2022-06-15 NOTE — PROGRESS NOTES
Claudia Spain (: 1940) is a 80 y.o. female patient here for evaluation of the following chief complaint(s):  Back Pain         ASSESSMENT/PLAN:  Below is the assessment and plan developed based on review of pertinent history, physical exam, labs, studies, and medications. 1. Chronic bilateral low back pain with left-sided sciatica  -     XR SPINE LUMB MIN 4 V; Future  -     MRI LUMB SPINE WO CONT; Future  2. Cervical stenosis of spine  -     MRI CERV SPINE WO CONT; Future  3. Spinal stenosis of lumbar region without neurogenic claudication  -     MRI LUMB SPINE WO CONT; Future      The patient's radiologic findings have been reviewed with her in detail today. She has had a several week history of intermittent numbness and tingling in her upper and lower extremities, predominantly her lower extremities. Over the past 10 to 12 days, she has had constant numbness in her left lower extremity. She has had extensive work-up in the emergency department to rule out a recent CVA. She does have some facial numbness as well. We have discussed various treatment options, and I would like for her to obtain MRI of both the cervical and lumbar spine to evaluate for any compressive pathology that might explain her symptoms. She will return for a follow-up visit after her MRIs are complete. Return for MRI follow up. SUBJECTIVE/OBJECTIVE:  Claudia Spain (: 1940) is a 80 y.o. female who presents today for the following:  Chief Complaint   Patient presents with    Back Pain        HPI   Ms. Pablo Lawrence turns today with concerns of a new onset of lower extremity numbness. She states that she had a recent coccygeal injection approximately 1 month ago, which has resolved her coccygeal pain. She is not experiencing any neck or low back pain or any residual coccygeal pain. She states that she began with a sudden onset of intermittent bilateral lower extremity numbness with radiation to the foot on May 20. She states that her entire right side became numb on [address], precipitating a trip to the emergency department and evaluation for possible stroke. She was cleared from this standpoint. Since then, her numbness has become constant and only in the left lower extremity. She does note that she has some residual facial numbness. No upper or lower extremity pain tingling or weakness. No difficulty with balance, falls, or dropping objects. IMAGING:  XR Results (most recent):  Results from Appointment encounter on 06/15/22    XR SPINE LUMB MIN 4 V    Narrative  AP, lateral, flexion, and extension films of the lumbar spine reveal no evidence of acute fracture, lytic lesion, or instability. There is an old stable appearing superior endplate compression deformity at L4. Slight loss of the normal lumbar lordosis. There is evidence of multilevel disc degeneration and spondylosis. MRI Results (most recent):        Allergies   Allergen Reactions    Clindamycin Anaphylaxis    Ketek [Telithromycin] Anaphylaxis    Levaquin [Levofloxacin] Anaphylaxis    Nexium [Esomeprazole Magnesium] Anaphylaxis    Sudafed [Pseudoephedrine Hcl] Anaphylaxis    Voltaren [Diclofenac Sodium] Other (comments)     Mild throat closing and severe nausea    Xyzal [Levocetirizine] Anaphylaxis    Ceftin [Cefuroxime Axetil] Unknown (comments)    Cortisone Rash     Can take depro medrol if preservative free only    Methylprednisolone Rash     Facial rash    Mobic [Meloxicam] Other (comments)     Throat closing, severe nausea, abd pain and facial swelling    Vibramycin [Doxycycline Calcium] Rash     Facial rash    Afrin [Pseudoephedrine Sulfate] Other (comments)     Facial numbness that lasted 45 minutes during testing    Antihistamine-1 Anaphylaxis    Azithromycin Other (comments)     \"neck stiffness & face numbness & swelling\"    Bactrim [Sulfamethoprim Ds] Rash     Facial rash and swelling    Biaxin [Clarithromycin] Rash Severe facial rash and swelling    Ciprofloxacin Other (comments)     \"numbness and tingling in foot & leg\"    Codeine Angioedema    Doxycycline Anaphylaxis    Flagyl [Metronidazole] Other (comments)     Reports on the 7th day of taking it \"I had SEVERE Cranial pain that was not a headache\"     Gabapentin Other (comments)    Pcn [Penicillins] Anaphylaxis    Restasis [Cyclosporine] Angioedema       Current Outpatient Medications   Medication Sig    triamcinolone acetonide (KENALOG) 0.1 % topical cream Apply  to affected area two (2) times daily as needed for Skin Irritation. use thin layer    pantoprazole sodium (PANTOPRAZOLE PO) Take 40 mg by mouth daily.  propranoloL (INDERAL) 10 mg tablet Take 1 Tab by mouth four (4) times daily as needed for Anxiety.  acetaminophen (TYLENOL) 325 mg tablet Take 325 mg by mouth every eight (8) hours.  therapeutic multivitamin (THERAGRAN) tablet Take 1 Tab by mouth daily.  CALCIUM CITRATE (CITRACAL PO) Take 1 Tab by mouth two (2) times a day.  polyethylene glycol (MIRALAX) 17 gram packet Take 17 g by mouth daily as needed.  docusate sodium (COLACE) 100 mg capsule Take 100 mg by mouth two (2) times daily as needed.  cholecalciferol, vitamin D3, 2,000 unit tab Take 1 Tab by mouth daily.  loteprednol etabonate (LOTEMAX) 0.5 % ophthalmic suspension Administer 1 Drop to both eyes daily. No current facility-administered medications for this visit.        Past Medical History:   Diagnosis Date    Diverticulitis     Dystrophy, cornea     Environmental allergies     Family history of skin cancer     GERD (gastroesophageal reflux disease)     Hypotension     Ill-defined condition     Hyponatremia    Menopause     LMP-unknown    Multiple drug allergies     Osteoporosis     Dr. Cas Posada at [address] 05/2019    Sun-damaged skin     Vertebral compression fracture ([facility] [address] 01/05/12    Vocal cord anomaly     vocal cord dysfunction per speech therapist        Past Surgical History:   Procedure Laterality Date    COLONOSCOPY N/A 7/7/2017    COLONOSCOPY performed by Naomi Gaona MD at [facility] ENDOSCOPY    HC BLD CARPEL [facility] HX CATARACT REMOVAL  07/2012    HX GYN      D&C    HX MOHS PROCEDURES  04/11/2017    BCC left cheek by Dr. Zuly Tolbert History   Problem Relation Age of Onset    Heart Disease Mother     Cancer Father 61        lung    Cataract Daughter         congenital cataracts    Heart Disease Daughter         fast heart rythmn        Social History     Tobacco Use    Smoking status: Never Smoker    Smokeless tobacco: Never Used   Substance Use Topics    Alcohol use: No        Review of Systems   Musculoskeletal: Negative for back pain and neck pain. Neurological: Positive for numbness. All other systems reviewed and are negative. No flowsheet data found. Vitals:  Ht 5' 2\" (1.575 m)   Wt 130 lb (59 kg)   LMP  (LMP Unknown)   BMI 23.78 kg/m²    Body mass index is 23.78 kg/m². Physical Exam    Neurologic  Sensory  Light Touch - Intact - Globally. Overall Assessment of Muscle Strength and Tone reveals  Upper Extremities - Right Deltoid - 5/5. Left Deltoid - 5/5. Right Bicep - 5/5. Left Bicep - 5/5. Right Tricep - 5/5. Left Tricep - 5/5. Right Wrist Extensors - 5/5. Left Wrist Extensors - 5/5. Right Wrist Flexors - 5/5. Left Wrist Flexors - 5/5. Right Intrinsics - 5/5. Left Intrinsics - 5/5. Lower Extremities - Right Iliopsoas - 5/5. Left Iliopsoas - 5/5. Right Tibialis Anterior - 5/5. Left Tibialis Anterior - 5/5. Right Gastroc-Soleus - 5/5. Left Gastroc-Soleus - 5/5. Right EHL - 5/5. Left EHL - 5/5. General Assessment of Reflexes  Right Hand - Alicia's sign is negative in the right hand. Left Hand - Alicia's sign is negative in the left hand. Right Ankle - Clonus is not present. Left Ankle - Clonus is not present.   Reflexes (Dermatomes)  2/2 Normal - Left Bicep (C5-6), Left Tricep (C7-8), Left Brachioradialis (C5-6), Right Bicep (C5-6), Right Tricep (C7-8) and Right Brachioradialis (C5-6). Left Achilles (L5-S2), Left Knee (L2-4), Right Achilles (L5-S2) and Right Knee (L2-4). Musculoskeletal  Global Assessment  Examination of related systems reveals - well-developed, well-nourished, in no acute distress, alert and oriented x 3 and normal coordination. Gait and Station - normal gait and station and normal posture. Spine/Ribs/Pelvis  Cervical Spine - Evaluation of related systems reveals - no lymphadenopathy and neurovascularly intact bilaterally. Inspection and Palpation - Tenderness - moderate and localized. Assessment of pain reveals the following findings: - Location - cervical area. ROJM - Flexion - 85 °. Right Lateral Flexion - 35 °. Left Lateral Flexion - 35 °. Extension - 70 °. Right Rotation - 80 °. Left Rotation - 80 °. Cervical Spine - Functional Testing - Foraminal Compression/Spurling's Test negative, Shoulder Depression Test negative, Upper Limb Tension Test negative. Thoracic (Dorsal) Spine - Examination of the thoracic spine reveals - no tenderness over thoracic vertebrae, no pain, normal sensation and normal thoracic spine movements. Lumbosacral Spine - Examination of the lumbosacral spine reveals - no known fractures or deformities. Inspection and Palpation - Tenderness - moderate. Assessment of pain reveals the following findings - The pain is characterized as - moderate. Location - pain refers to lower back bilaterally. ROJM - Trunk Extension - 15 degrees. Lumbar Spine Flexion - 35 °. Lumbosacral Spine - Functional Testing - Babinski Test negative, Prone Knee Bending Test negative, Slump Test negative, Straight Leg Raising Test negative. Dr. Sarah Ash was available for immediate consult during this encounter. An electronic signature was used to authenticate this note.   -- DION Phillips

## 2022-06-15 NOTE — PROGRESS NOTES
1. Have you been to the ER, urgent care clinic since your last visit? Hospitalized since your last visit? No    2. Have you seen or consulted any other health care providers outside of the [address] [place] [address] since your last visit? Include any pap smears or colon screening.  No    Chief Complaint   Patient presents with    Back Pain

## 2022-06-16 ENCOUNTER — HOSPITAL ENCOUNTER (OUTPATIENT)
Dept: ULTRASOUND IMAGING | Age: 82
Discharge: HOME | End: 2022-06-16
Attending: INTERNAL MEDICINE
Payer: MEDICARE

## 2022-06-16 DIAGNOSIS — E04.2 MULTIPLE THYROID NODULES: ICD-10-CM

## 2022-06-16 PROCEDURE — 76536 US EXAM OF HEAD AND NECK: CPT

## 2022-07-03 LAB
ALBUMIN SERPL-MCNC: 3.8 G/DL (ref 3.5–5)
ALBUMIN/GLOB SERPL: 1 {RATIO} (ref 1.1–2.2)
ALP SERPL-CCNC: 66 U/L (ref 45–117)
ALT SERPL-CCNC: 17 U/L (ref 12–78)
ANION GAP SERPL CALC-SCNC: 3 MMOL/L (ref 5–15)
AST SERPL-CCNC: 14 U/L (ref 15–37)
BASOPHILS # BLD: 0.1 K/UL (ref 0–0.1)
BASOPHILS NFR BLD: 1 % (ref 0–1)
BILIRUB SERPL-MCNC: 0.2 MG/DL (ref 0.2–1)
BUN SERPL-MCNC: 13 MG/DL (ref 6–20)
BUN/CREAT SERPL: 19 (ref 12–20)
CALCIUM SERPL-MCNC: 9.2 MG/DL (ref 8.5–10.1)
CHLORIDE SERPL-SCNC: 101 MMOL/L (ref 97–108)
CO2 SERPL-SCNC: 29 MMOL/L (ref 21–32)
COMMENT, HOLDF: NORMAL
CREAT SERPL-MCNC: 0.67 MG/DL (ref 0.55–1.02)
DIFFERENTIAL METHOD BLD: ABNORMAL
EOSINOPHIL # BLD: 0.7 K/UL (ref 0–0.4)
EOSINOPHIL NFR BLD: 12 % (ref 0–7)
ERYTHROCYTE [DISTWIDTH] IN BLOOD BY AUTOMATED COUNT: 13.5 % (ref 11.5–14.5)
GLOBULIN SER CALC-MCNC: 3.7 G/DL (ref 2–4)
GLUCOSE SERPL-MCNC: 91 MG/DL (ref 65–100)
HCT VFR BLD AUTO: 38.8 % (ref 35–47)
HGB BLD-MCNC: 13 G/DL (ref 11.5–16)
IMM GRANULOCYTES # BLD AUTO: 0 K/UL (ref 0–0.04)
IMM GRANULOCYTES NFR BLD AUTO: 0 % (ref 0–0.5)
LYMPHOCYTES # BLD: 1.4 K/UL (ref 0.8–3.5)
LYMPHOCYTES NFR BLD: 23 % (ref 12–49)
MCH RBC QN AUTO: 29.7 PG (ref 26–34)
MCHC RBC AUTO-ENTMCNC: 33.5 G/DL (ref 30–36.5)
MCV RBC AUTO: 88.6 FL (ref 80–99)
MONOCYTES # BLD: 0.7 K/UL (ref 0–1)
MONOCYTES NFR BLD: 11 % (ref 5–13)
NEUTS SEG # BLD: 3.1 K/UL (ref 1.8–8)
NEUTS SEG NFR BLD: 53 % (ref 32–75)
NRBC # BLD: 0 K/UL (ref 0–0.01)
NRBC BLD-RTO: 0 PER 100 WBC
PLATELET # BLD AUTO: 240 K/UL (ref 150–400)
PMV BLD AUTO: 9.5 FL (ref 8.9–12.9)
POTASSIUM SERPL-SCNC: 3.8 MMOL/L (ref 3.5–5.1)
PROT SERPL-MCNC: 7.5 G/DL (ref 6.4–8.2)
RBC # BLD AUTO: 4.38 M/UL (ref 3.8–5.2)
SAMPLES BEING HELD,HOLD: NORMAL
SODIUM SERPL-SCNC: 133 MMOL/L (ref 136–145)
WBC # BLD AUTO: 5.9 K/UL (ref 3.6–11)

## 2022-07-03 PROCEDURE — 83880 ASSAY OF NATRIURETIC PEPTIDE: CPT

## 2022-07-03 PROCEDURE — 80053 COMPREHEN METABOLIC PANEL: CPT

## 2022-07-03 PROCEDURE — 84484 ASSAY OF TROPONIN QUANT: CPT

## 2022-07-03 PROCEDURE — 85025 COMPLETE CBC W/AUTO DIFF WBC: CPT

## 2022-07-03 PROCEDURE — 99285 EMERGENCY DEPT VISIT HI MDM: CPT

## 2022-07-03 PROCEDURE — 36415 COLL VENOUS BLD VENIPUNCTURE: CPT

## 2022-07-03 PROCEDURE — 93005 ELECTROCARDIOGRAM TRACING: CPT

## 2022-07-04 ENCOUNTER — APPOINTMENT (OUTPATIENT)
Dept: GENERAL RADIOLOGY | Age: 82
End: 2022-07-04
Attending: EMERGENCY MEDICINE
Payer: MEDICARE

## 2022-07-04 ENCOUNTER — APPOINTMENT (OUTPATIENT)
Dept: CT IMAGING | Age: 82
End: 2022-07-04
Attending: EMERGENCY MEDICINE
Payer: MEDICARE

## 2022-07-04 ENCOUNTER — HOSPITAL ENCOUNTER (EMERGENCY)
Age: 82
Discharge: HOME | End: 2022-07-04
Attending: EMERGENCY MEDICINE
Payer: MEDICARE

## 2022-07-04 VITALS
TEMPERATURE: 97.3 F | RESPIRATION RATE: 13 BRPM | HEART RATE: 70 BPM | OXYGEN SATURATION: 96 % | BODY MASS INDEX: 23.92 KG/M2 | DIASTOLIC BLOOD PRESSURE: 65 MMHG | WEIGHT: 130 LBS | HEIGHT: 62 IN | SYSTOLIC BLOOD PRESSURE: 114 MMHG

## 2022-07-04 DIAGNOSIS — R06.02 SOB (SHORTNESS OF BREATH): Primary | ICD-10-CM

## 2022-07-04 DIAGNOSIS — R07.89 ATYPICAL CHEST PAIN: ICD-10-CM

## 2022-07-04 LAB
BNP SERPL-MCNC: 191 PG/ML
D DIMER PPP FEU-MCNC: 1.7 MG/L FEU (ref 0–0.65)
TROPONIN-HIGH SENSITIVITY: 4 NG/L (ref 0–51)
TROPONIN-HIGH SENSITIVITY: 5 NG/L (ref 0–51)

## 2022-07-04 PROCEDURE — 74011250637 HC RX REV CODE- 250/637: Performed by: EMERGENCY MEDICINE

## 2022-07-04 PROCEDURE — 36415 COLL VENOUS BLD VENIPUNCTURE: CPT

## 2022-07-04 PROCEDURE — 74011000636 HC RX REV CODE- 636: Performed by: RADIOLOGY

## 2022-07-04 PROCEDURE — 71046 X-RAY EXAM CHEST 2 VIEWS: CPT

## 2022-07-04 PROCEDURE — 85379 FIBRIN DEGRADATION QUANT: CPT

## 2022-07-04 PROCEDURE — 71275 CT ANGIOGRAPHY CHEST: CPT

## 2022-07-04 RX ORDER — NAPROXEN 500 MG/1
500 TABLET ORAL 2 TIMES DAILY WITH MEALS
Qty: 20 TABLET | Refills: 0 | Status: CANCELLED | OUTPATIENT
Start: 2022-07-04 | End: 2022-07-14

## 2022-07-04 RX ORDER — ASPIRIN 325 MG
325 TABLET ORAL DAILY
Qty: 30 TABLET | Refills: 0 | Status: SHIPPED | OUTPATIENT
Start: 2022-07-04

## 2022-07-04 RX ORDER — ASPIRIN 325 MG
325 TABLET ORAL
Status: COMPLETED | OUTPATIENT
Start: 2022-07-04 | End: 2022-07-04

## 2022-07-04 RX ADMIN — IOPAMIDOL 60 ML: 755 INJECTION, SOLUTION INTRAVENOUS at 05:06

## 2022-07-04 RX ADMIN — ASPIRIN 325 MG ORAL TABLET 325 MG: 325 PILL ORAL at 06:01

## 2022-07-04 NOTE — ED PROVIDER NOTES
80-year-old female history of diverticulitis, allergies, GERD, local drug allergies presents to the emergency department chief complaint of chest pain and shortness of breath. She noted shortness of breath about 1.5 months ago which was felt to be potentially due to allergic reaction. She has gone through multiple courses of steroids and each time has had worsening symptoms. She feels that for the past week her shortness of breath has moved from her throat down to her chest with some chest pain. Denies history of pulmonary or cardiovascular disease. She was seen by an allergist and trialed on allergy medications which did not seem to help. She last took albuterol this morning. The history is provided by the patient and medical records. Shortness of Breath  This is a new problem. The current episode started more than 1 week ago. The problem has been gradually worsening. Associated symptoms include chest pain. Pertinent negatives include no fever, no headaches, no sore throat, no cough, no vomiting, no abdominal pain, no rash and no leg swelling. She has had prior ED visits. Associated medical issues do not include asthma, COPD, PE or CAD.         Past Medical History:   Diagnosis Date    Diverticulitis     Dystrophy, cornea     Environmental allergies     Family history of skin cancer     GERD (gastroesophageal reflux disease)     Hypotension     Ill-defined condition     Hyponatremia    Menopause     LMP-unknown    Multiple drug allergies     Osteoporosis     Dr. Hood Gathers at [address] 05/2019    Sun-damaged skin     Vertebral compression fracture ([facility] [address] 01/05/12    Vocal cord anomaly     vocal cord dysfunction per speech therapist       Past Surgical History:   Procedure Laterality Date    COLONOSCOPY N/A 7/7/2017    [facility] Hermann [facility]COPY     [facility] HX CATARACT REMOVAL  07/2012    HX GYN      D&C    HX MOHS PROCEDURES  04/11/2017    [facility] left cheek by Dr. Joann Pelletier         Family History:   Problem Relation Age of Onset    Heart Disease Mother     Cancer Father 61        lung    Cataract Daughter         congenital cataracts    Heart Disease Daughter         fast heart rythmn       Social History     Socioeconomic History    Marital status:      Spouse name: Not on file    Number of children: Not on file    Years of education: Not on file    Highest education level: Not on file   Occupational History    Not on file   Tobacco Use    Smoking status: Never Smoker    Smokeless tobacco: Never Used   Substance and Sexual Activity    Alcohol use: No    Drug use: No    Sexual activity: Not on file   Other Topics Concern    Not on file   Social History Narrative    Not on file     Social Determinants of Health     Financial Resource Strain:     Difficulty of Paying Living Expenses: Not on file   Food Insecurity:     Worried About [address] in the Last Year: Not on file    Wally of Food in the Last Year: Not on file   Transportation Needs:     Lack of Transportation (Medical): Not on file    Lack of Transportation (Non-Medical):  Not on file   Physical Activity:     Days of Exercise per Week: Not on file    Minutes of Exercise per Session: Not on file   Stress:     Feeling of Stress : Not on file   Social Connections:     Frequency of Communication with Friends and Family: Not on file    Frequency of Social Gatherings with Friends and Family: Not on file    Attends Hoahaoism Services: Not on file    Active Member of Clubs or Organizations: Not on file    Attends Club or Organization Meetings: Not on file    Marital Status: Not on file   Intimate Partner Violence:     Fear of Current or Ex-Partner: Not on file    Emotionally Abused: Not on file    Physically Abused: Not on file    Sexually Abused: Not on file   Housing Stability:     Unable to Pay for Housing in the Last Year: Not on file    Number of Places Lived in the Last Year: Not on file    Unstable Housing in the Last Year: Not on file         ALLERGIES: Clindamycin, Ketek [telithromycin], Levaquin [levofloxacin], Nexium [esomeprazole magnesium], Sudafed [pseudoephedrine hcl], Voltaren [diclofenac sodium], Xyzal [levocetirizine], Ceftin [cefuroxime axetil], Cortisone, Methylprednisolone, Mobic [meloxicam], Vibramycin [doxycycline calcium], Afrin [pseudoephedrine sulfate], Antihistamine-1, Azithromycin, Bactrim [sulfamethoprim ds], Biaxin [clarithromycin], Ciprofloxacin, Codeine, Doxycycline, Flagyl [metronidazole], Gabapentin, Pcn [penicillins], and Restasis [cyclosporine]    Review of Systems   Constitutional: Negative for fatigue and fever. HENT: Negative for sneezing and sore throat. Respiratory: Positive for shortness of breath. Negative for cough. Cardiovascular: Positive for chest pain. Negative for leg swelling. Gastrointestinal: Positive for nausea. Negative for abdominal pain, diarrhea and vomiting. Genitourinary: Negative for difficulty urinating and dysuria. Musculoskeletal: Negative for arthralgias and myalgias. Skin: Negative for color change and rash. Neurological: Negative for weakness and headaches. Psychiatric/Behavioral: Negative for agitation and behavioral problems. Vitals:    07/03/22 2124   BP: 134/88   Pulse: 78   Resp: 18   Temp: 97.3 °F (36.3 °C)   SpO2: 98%            Physical Exam  Vitals and nursing note reviewed. Constitutional:       General: She is not in acute distress. Appearance: Normal appearance. She is well-developed. She is not ill-appearing, toxic-appearing or diaphoretic. HENT:      Head: Normocephalic and atraumatic. Nose: Nose normal.      Mouth/Throat:      Mouth: Mucous membranes are moist.      Pharynx: Oropharynx is clear. Eyes:      Extraocular Movements: Extraocular movements intact.       Conjunctiva/sclera: Conjunctivae normal.      Pupils: Pupils are equal, round, and reactive to light. Cardiovascular:      Rate and Rhythm: Normal rate and regular rhythm. Pulses: Normal pulses. Heart sounds: Normal heart sounds. Pulmonary:      Effort: Pulmonary effort is normal. No respiratory distress. Breath sounds: Normal breath sounds. No wheezing. Chest:      Chest wall: No tenderness. Abdominal:      General: Abdomen is flat. There is no distension. Palpations: Abdomen is soft. Tenderness: There is no abdominal tenderness. There is no guarding or rebound. Musculoskeletal:         General: No swelling, tenderness, deformity or signs of injury. Normal range of motion. Cervical back: Normal range of motion and neck supple. No rigidity. No muscular tenderness. Right lower leg: No edema. Left lower leg: No edema. Skin:     General: Skin is warm and dry. Capillary Refill: Capillary refill takes less than 2 seconds. Neurological:      General: No focal deficit present. Mental Status: She is alert and oriented to person, place, and time. Psychiatric:         Mood and Affect: Mood normal.         Behavior: Behavior normal.          MDM  Number of Diagnoses or Management Options  Atypical chest pain  SOB (shortness of breath)  Diagnosis management comments: 80-year-old female presents as above with chest pain and shortness of breath. Work-up in the emergency department reveals normal labs and a trace pericardial effusion on CT scan. Plan to start on anti-inflammatory with instructions to follow-up with cardiology for potential pericarditis. She has allergies to NSAIDs but has tolerated aspirin in the past.  Will start on aspirin with first dose in the emergency department. If her symptoms do not resolve after several weeks and she may benefit from pulmonary consultation in the future. Sister  of pulmonary fibrosis, there is no evidence of pulmonary fibrosis on CT today.         Amount and/or Complexity of Data Reviewed  Clinical lab tests: reviewed  Tests in the radiology section of CPT®: reviewed  Tests in the medicine section of CPT®: reviewed  Decide to obtain previous medical records or to obtain history from someone other than the patient: yes           Procedures

## 2022-07-04 NOTE — DISCHARGE INSTRUCTIONS
Thank you for allowing us to provide you with medical care today. We realize that you have many choices for your emergency care needs. We thank you for choosing [facility]. Please choose us in the future for any continued health care needs. We hope we addressed all of your medical concerns. We strive to provide excellent quality care in the Emergency Department. Anything less than excellent does not meet our expectations. The exam and treatment you received in the Emergency Department were for an emergent problem and are not intended as complete care. It is important that you follow up with a doctor, nurse practitioner, or physician's assistant for ongoing care. If your symptoms worsen or you do not improve as expected and you are unable to reach your usual health care provider, you should return to the Emergency Department. We are available 24 hours a day. Take this sheet with you when you go to your follow-up visit. If you have any problem arranging the follow-up visit, contact the Emergency Department immediately. Make an appointment your family doctor for follow up of this visit. Return to the ER if you are unable to be seen in a timely manner.

## 2022-07-04 NOTE — ED TRIAGE NOTES
TRIAGE NOTE:  Patient arrives with c/o shortness of breath x 3 weeks, and reports chest tightness x 1 week, and reports got worse last night accompanied by nausea. Patient reports numbness and tingling in both feet but more in the left x 3 weeks.

## 2022-07-05 ENCOUNTER — OFFICE VISIT (OUTPATIENT)
Dept: ORTHOPEDIC SURGERY | Age: 82
End: 2022-07-05

## 2022-07-05 VITALS — WEIGHT: 130 LBS | HEIGHT: 62 IN | BODY MASS INDEX: 23.92 KG/M2

## 2022-07-05 DIAGNOSIS — M54.41 CHRONIC BILATERAL LOW BACK PAIN WITH BILATERAL SCIATICA: ICD-10-CM

## 2022-07-05 DIAGNOSIS — M54.42 CHRONIC BILATERAL LOW BACK PAIN WITH BILATERAL SCIATICA: ICD-10-CM

## 2022-07-05 DIAGNOSIS — G89.29 CHRONIC BILATERAL LOW BACK PAIN WITH BILATERAL SCIATICA: ICD-10-CM

## 2022-07-05 DIAGNOSIS — M51.36 LUMBAR DEGENERATIVE DISC DISEASE: ICD-10-CM

## 2022-07-05 DIAGNOSIS — M48.061 SPINAL STENOSIS OF LUMBAR REGION WITHOUT NEUROGENIC CLAUDICATION: Primary | ICD-10-CM

## 2022-07-05 LAB
ATRIAL RATE: 77 BPM
CALCULATED P AXIS, ECG09: 65 DEGREES
CALCULATED R AXIS, ECG10: 82 DEGREES
CALCULATED T AXIS, ECG11: 58 DEGREES
DIAGNOSIS, 93000: NORMAL
P-R INTERVAL, ECG05: 160 MS
Q-T INTERVAL, ECG07: 412 MS
QRS DURATION, ECG06: 122 MS
QTC CALCULATION (BEZET), ECG08: 466 MS
VENTRICULAR RATE, ECG03: 77 BPM

## 2022-07-05 PROCEDURE — 99213 OFFICE O/P EST LOW 20 MIN: CPT | Performed by: ORTHOPAEDIC SURGERY

## 2022-07-05 PROCEDURE — 1101F PT FALLS ASSESS-DOCD LE1/YR: CPT | Performed by: ORTHOPAEDIC SURGERY

## 2022-07-05 PROCEDURE — G8420 CALC BMI NORM PARAMETERS: HCPCS | Performed by: ORTHOPAEDIC SURGERY

## 2022-07-05 PROCEDURE — G8432 DEP SCR NOT DOC, RNG: HCPCS | Performed by: ORTHOPAEDIC SURGERY

## 2022-07-05 PROCEDURE — G8427 DOCREV CUR MEDS BY ELIG CLIN: HCPCS | Performed by: ORTHOPAEDIC SURGERY

## 2022-07-05 PROCEDURE — 1123F ACP DISCUSS/DSCN MKR DOCD: CPT | Performed by: ORTHOPAEDIC SURGERY

## 2022-07-05 PROCEDURE — 1090F PRES/ABSN URINE INCON ASSESS: CPT | Performed by: ORTHOPAEDIC SURGERY

## 2022-07-05 PROCEDURE — G8536 NO DOC ELDER MAL SCRN: HCPCS | Performed by: ORTHOPAEDIC SURGERY

## 2022-07-05 NOTE — PROGRESS NOTES
Telma Macario (: 1940) is a 80 y.o. female patient here for evaluation of the following chief complaint(s):  Back Pain (Lumbar MRI Results 22) and Neck Pain (Cervical MRI Results 22)         ASSESSMENT/PLAN:  Below is the assessment and plan developed based on review of pertinent history, physical exam, labs, studies, and medications. 1. Spinal stenosis of lumbar region without neurogenic claudication  -     REFERRAL TO PHYSICAL THERAPY  2. Lumbar degenerative disc disease  3. Chronic bilateral low back pain with bilateral sciatica      The patient's radiologic findings have been reviewed with her in detail today. She has had a several week history of intermittent numbness and tingling in her upper and lower extremities, predominantly her lower extremities. The MRI was reviewed which demonstrates the previously known L5-S1 stenosis related to facet hypertrophy and ligamentum hypertrophy. We had a lengthy discussion about treatment options. She does not want any surgical intervention which I think is reasonable. She does not want any further injections given some of the reactions that she has had from that. However she did get some improvement from the coccyx injection that was done approximately 2 to 3 months ago and she can have that again. She does not tolerate medications either. We will start some physical therapy again. Follow-up with the PA. No follow-ups on file. SUBJECTIVE/OBJECTIVE:  Telma Macario (: 1940) is a 80 y.o. female who presents today for the following:  Chief Complaint   Patient presents with    Back Pain     Lumbar MRI Results 22    Neck Pain     Cervical MRI Results 22        Back Pain   Associated symptoms include numbness. Neck Pain       Ms. Indy Falk turns today with concerns of a new onset of lower extremity numbness.   She states that she had a recent coccygeal injection approximately 1 month ago, which has resolved her coccygeal pain.  She is not experiencing any neck or low back pain or any residual coccygeal pain. She states that she began with a sudden onset of intermittent bilateral lower extremity numbness with radiation to the foot on May 20. She states that her entire right side became numb on [address], precipitating a trip to the emergency department and evaluation for possible stroke. She was cleared from this standpoint. Since then, her numbness has become constant and only in the left lower extremity. She does note that she has some residual facial numbness. No upper or lower extremity pain tingling or weakness. No difficulty with balance, falls, or dropping objects. IMAGING:  XR Results (most recent):  Results from Hospital Encounter encounter on 07/04/22    XR CHEST PA LAT    Narrative  EXAM: XR CHEST PA LAT    INDICATION: Shortness of breath and chest pain. COMPARISON: Chest views on 6/3/2022, 5/17/2022, and 10/9/2021    TECHNIQUE: PA and lateral chest views    FINDINGS: Cardiac monitoring wires overlie the thorax. The cardiomediastinal and  hilar contours are within normal limits. The pulmonary vasculature is within  normal limits. Upper lobe lucencies indicate emphysema. Lungs and pleural spaces are otherwise  clear. Bones are osteopenic. Impression  No acute process. Emphysema. No change. MRI Results (most recent):        Allergies   Allergen Reactions    Clindamycin Anaphylaxis    Ketek [Telithromycin] Anaphylaxis    Levaquin [Levofloxacin] Anaphylaxis    Nexium [Esomeprazole Magnesium] Anaphylaxis    Sudafed [Pseudoephedrine Hcl] Anaphylaxis    Voltaren [Diclofenac Sodium] Other (comments)     Mild throat closing and severe nausea    Xyzal [Levocetirizine] Anaphylaxis    Ceftin [Cefuroxime Axetil] Unknown (comments)    Cortisone Rash     Can take depro medrol if preservative free only    Methylprednisolone Rash     Facial rash    Mobic [Meloxicam] Other (comments)     Throat closing, severe nausea, abd pain and facial swelling    Vibramycin [Doxycycline Calcium] Rash     Facial rash    Afrin [Pseudoephedrine Sulfate] Other (comments)     Facial numbness that lasted 45 minutes during testing    Antihistamine-1 Anaphylaxis    Azithromycin Other (comments)     \"neck stiffness & face numbness & swelling\"    Bactrim [Sulfamethoprim Ds] Rash     Facial rash and swelling    Biaxin [Clarithromycin] Rash     Severe facial rash and swelling    Ciprofloxacin Other (comments)     \"numbness and tingling in foot & leg\"    Codeine Angioedema    Doxycycline Anaphylaxis    Flagyl [Metronidazole] Other (comments)     Reports on the 7th day of taking it \"I had SEVERE Cranial pain that was not a headache\"     Gabapentin Other (comments)    Pcn [Penicillins] Anaphylaxis    Restasis [Cyclosporine] Angioedema       Current Outpatient Medications   Medication Sig    aspirin (ASPIRIN) 325 mg tablet Take 1 Tablet by mouth daily.  triamcinolone acetonide (KENALOG) 0.1 % topical cream Apply  to affected area two (2) times daily as needed for Skin Irritation. use thin layer    pantoprazole sodium (PANTOPRAZOLE PO) Take 40 mg by mouth daily.  propranoloL (INDERAL) 10 mg tablet Take 1 Tab by mouth four (4) times daily as needed for Anxiety.  acetaminophen (TYLENOL) 325 mg tablet Take 325 mg by mouth every eight (8) hours.  therapeutic multivitamin (THERAGRAN) tablet Take 1 Tab by mouth daily.  CALCIUM CITRATE (CITRACAL PO) Take 1 Tab by mouth two (2) times a day.  polyethylene glycol (MIRALAX) 17 gram packet Take 17 g by mouth daily as needed.  docusate sodium (COLACE) 100 mg capsule Take 100 mg by mouth two (2) times daily as needed.  cholecalciferol, vitamin D3, 2,000 unit tab Take 1 Tab by mouth daily.  loteprednol etabonate (LOTEMAX) 0.5 % ophthalmic suspension Administer 1 Drop to both eyes daily. No current facility-administered medications for this visit.        Past Medical History:   Diagnosis Date    Diverticulitis     Dystrophy, cornea     Environmental allergies     Family history of skin cancer     GERD (gastroesophageal reflux disease)     Hypotension     Ill-defined condition     Hyponatremia    Menopause     LMP-unknown    Multiple drug allergies     Osteoporosis     Dr. Jimenez Sparks at [address] 05/2019    Sun-damaged skin     Vertebral compression fracture ([address] 01/05/12    Vocal cord anomaly     vocal cord dysfunction per speech therapist        Past Surgical History:   Procedure Laterality Date    COLONOSCOPY N/A 7/7/2017    [facility] at [facility] ENDOSCOPY     [facility] HX CATARACT REMOVAL  07/2012    HX GYN      D&C    HX MOHS PROCEDURES  04/11/2017    [facility] left cheek by Dr. Anton Rosales       Family History   Problem Relation Age of Onset    Heart Disease Mother     Cancer Father 61        lung    Cataract Daughter         congenital cataracts    Heart Disease Daughter         fast heart rythmn        Social History     Tobacco Use    Smoking status: Never Smoker    Smokeless tobacco: Never Used   Substance Use Topics    Alcohol use: No        Review of Systems   Musculoskeletal: Positive for back pain and neck pain. Neurological: Positive for numbness. All other systems reviewed and are negative. No flowsheet data found. Vitals:  Ht 5' 2\" (1.575 m)   Wt 130 lb (59 kg)   LMP  (LMP Unknown)   BMI 23.78 kg/m²    Body mass index is 23.78 kg/m². Physical Exam    Neurologic  Sensory  Light Touch - Intact - Globally. Overall Assessment of Muscle Strength and Tone reveals  Upper Extremities - Right Deltoid - 5/5. Left Deltoid - 5/5. Right Bicep - 5/5. Left Bicep - 5/5. Right Tricep - 5/5. Left Tricep - 5/5. Right Wrist Extensors - 5/5. Left Wrist Extensors - 5/5. Right Wrist Flexors - 5/5. Left Wrist Flexors - 5/5. Right Intrinsics - 5/5. Left Intrinsics - 5/5.   Lower Extremities - Right Iliopsoas - 5/5. Left Iliopsoas - 5/5. Right Tibialis Anterior - 5/5. Left Tibialis Anterior - 5/5. Right Gastroc-Soleus - 5/5. Left Gastroc-Soleus - 5/5. Right EHL - 5/5. Left EHL - 5/5. General Assessment of Reflexes  Right Hand - Alicia's sign is negative in the right hand. Left Hand - Alicia's sign is negative in the left hand. Right Ankle - Clonus is not present. Left Ankle - Clonus is not present. Reflexes (Dermatomes)  2/2 Normal - Left Bicep (C5-6), Left Tricep (C7-8), Left Brachioradialis (C5-6), Right Bicep (C5-6), Right Tricep (C7-8) and Right Brachioradialis (C5-6). Left Achilles (L5-S2), Left Knee (L2-4), Right Achilles (L5-S2) and Right Knee (L2-4). Musculoskeletal  Global Assessment  Examination of related systems reveals - well-developed, well-nourished, in no acute distress, alert and oriented x 3 and normal coordination. Gait and Station - normal gait and station and normal posture. Spine/Ribs/Pelvis  Cervical Spine - Evaluation of related systems reveals - no lymphadenopathy and neurovascularly intact bilaterally. Inspection and Palpation - Tenderness - moderate and localized. Assessment of pain reveals the following findings: - Location - cervical area. ROJM - Flexion - 85 °. Right Lateral Flexion - 35 °. Left Lateral Flexion - 35 °. Extension - 70 °. Right Rotation - 80 °. Left Rotation - 80 °. Cervical Spine - Functional Testing - Foraminal Compression/Spurling's Test negative, Shoulder Depression Test negative, Upper Limb Tension Test negative. Thoracic (Dorsal) Spine - Examination of the thoracic spine reveals - no tenderness over thoracic vertebrae, no pain, normal sensation and normal thoracic spine movements. Lumbosacral Spine - Examination of the lumbosacral spine reveals - no known fractures or deformities. Inspection and Palpation - Tenderness - moderate. Assessment of pain reveals the following findings - The pain is characterized as - moderate. Location - pain refers to lower back bilaterally. ROJM - Trunk Extension - 15 degrees. Lumbar Spine Flexion - 35 °. Lumbosacral Spine - Functional Testing - Babinski Test negative, Prone Knee Bending Test negative, Slump Test negative, Straight Leg Raising Test negative. An electronic signature was used to authenticate this note.   -- Tad Lawson MD

## 2022-07-05 NOTE — PROGRESS NOTES
1. Have you been to the ER, urgent care clinic since your last visit? Hospitalized since your last visit? No    2. Have you seen or consulted any other health care providers outside of the [address] [place] [address] since your last visit? Include any pap smears or colon screening.  No    Chief Complaint   Patient presents with    Back Pain     Lumbar MRI Results 6/20/22    Neck Pain     Cervical MRI Results 6/20/22

## 2022-07-06 NOTE — PATIENT INSTRUCTIONS
Spondylolysis and Spondylolisthesis: Exercises  Introduction  Here are some examples of exercises for you to try. The exercises may be suggested for a condition or for rehabilitation. Start each exercise slowly. Ease off the exercises if you start to have pain. You will be told when to start these exercises and which ones will work best for you. How to do the exercises  Single knee-to-chest    1. Lie on your back with your knees bent and your feet flat on the floor. You can put a small pillow under your head and neck if it is more comfortable. 2. Bring one knee to your chest, keeping the other foot flat on the floor. 3. Keep your lower back pressed to the floor. Hold for 15 to 30 seconds. 4. Relax, and lower the knee to the starting position. 5. Repeat with the other leg. Repeat 2 to 4 times with each leg. 6. To get more stretch, put your other leg flat on the floor while pulling your knee to your chest.  Double knee-to-chest    1. Lie on your back with your knees bent and your feet flat on the floor. You can put a small pillow under your head and neck if it is more comfortable. 2. Bring both knees to your chest.  3. Keep your lower back pressed to the floor. Hold for 15 to 30 seconds. 4. Relax, and lower your knees to the starting position. 5. Repeat 2 to 4 times. Alternate arm and leg (bird dog) exercise    Do this exercise slowly. Try to keep your body straight at all times. 1. Start on the floor, on your hands and knees. 2. Tighten your belly muscles by pulling your belly button in toward your spine. Be sure you continue to breathe normally and do not hold your breath. 3. Raise one arm off the floor, and hold it straight out in front of you. Be careful not to let your shoulder drop down, because that will twist your trunk. 4. Hold for about 6 seconds, then lower your arm and switch to your other arm. 5. Repeat 8 to 12 times on each arm.   6. When you can do this exercise with ease and no pain, repeat steps 1 through 5. But this time do it with one leg raised off the floor, holding your leg straight out behind you. Be careful not to let your hip drop down, because that will twist your trunk. 7. When holding your leg straight out becomes easier, try raising your opposite arm at the same time, and repeat steps 1 through 5. Bridging    1. Lie on your back with both knees bent. Your knees should be bent about 90 degrees. 2. Then push your feet into the floor, squeeze your buttocks, and lift your hips off the floor until your shoulders, hips, and knees are all in a straight line. 3. Hold for about 6 seconds as you continue to breathe normally, and then slowly lower your hips back down to the floor and rest for up to 10 seconds. 4. Repeat 8 to 12 times. Curl-ups    1. Lie on the floor on your back with your knees bent at a 90-degree angle. Your feet should be flat on the floor, about 12 inches from your buttocks. 2. Cross your arms over your chest. If this bothers your neck, try putting your hands behind your neck (not your head), with your elbows spread apart. 3. Slowly tighten your belly muscles and raise your shoulder blades off the floor. 4. Keep your head in line with your body, and do not press your chin to your chest.  5. Hold this position for 1 or 2 seconds, then slowly lower yourself back down to the floor. 6. Repeat 8 to 12 times. Plank    Do this exercise slowly. Try to keep your body straight at all times, and do not let one hip drop lower than the other. 1. Lie on your stomach, resting your upper body on your forearms. 2. Tighten your belly muscles by pulling your belly button in toward your spine. 3. Keeping your knees on the floor, press down with your forearms to lift your upper body off the floor. 4. Hold for about 6 seconds, then lower your body to the floor. Rest for up to 10 seconds. 5. Repeat 8 to 12 times.   6. Over time, work up to holding for 15 to 30 seconds each time.  7. If this exercise is easy to do with your knees on the floor, try doing this exercise with your knees and legs straight, supported by your toes on the floor. Follow-up care is a key part of your treatment and safety. Be sure to make and go to all appointments, and call your doctor if you are having problems. It's also a good idea to know your test results and keep a list of the medicines you take. Where can you learn more? Go to http://www.[employer].com/  Enter M245 in the search box to learn more about \"Spondylolysis and Spondylolisthesis: Exercises. \"  Current as of: July 1, 2021               Content Version: 13.2  © 2006-2022 [employer]. Care instructions adapted under license by [employer] (which disclaims liability or warranty for this information). If you have questions about a medical condition or this instruction, always ask your healthcare professional. [address] any warranty or liability for your use of this information.

## 2022-07-11 ENCOUNTER — HOSPITAL ENCOUNTER (OUTPATIENT)
Dept: CT IMAGING | Age: 82
Discharge: HOME | End: 2022-07-11
Attending: FAMILY MEDICINE
Payer: MEDICARE

## 2022-07-11 DIAGNOSIS — R06.00 DYSPNEA, UNSPECIFIED TYPE: ICD-10-CM

## 2022-07-11 PROCEDURE — 70490 CT SOFT TISSUE NECK W/O DYE: CPT

## 2022-07-12 NOTE — PROGRESS NOTES
Please convey results of normal neck CT to patient. No further recommendations at this time. Thank you! Angely German,

## 2022-07-13 ENCOUNTER — OFFICE VISIT (OUTPATIENT)
Dept: CARDIOLOGY CLINIC | Age: 82
End: 2022-07-13
Payer: MEDICARE

## 2022-07-13 VITALS
RESPIRATION RATE: 16 BRPM | WEIGHT: 133 LBS | SYSTOLIC BLOOD PRESSURE: 100 MMHG | HEIGHT: 62 IN | HEART RATE: 94 BPM | OXYGEN SATURATION: 96 % | DIASTOLIC BLOOD PRESSURE: 60 MMHG | BODY MASS INDEX: 24.48 KG/M2

## 2022-07-13 DIAGNOSIS — R06.02 SOB (SHORTNESS OF BREATH): ICD-10-CM

## 2022-07-13 DIAGNOSIS — R07.89 OTHER CHEST PAIN: Primary | ICD-10-CM

## 2022-07-13 PROCEDURE — 1090F PRES/ABSN URINE INCON ASSESS: CPT | Performed by: SPECIALIST

## 2022-07-13 PROCEDURE — G8427 DOCREV CUR MEDS BY ELIG CLIN: HCPCS | Performed by: SPECIALIST

## 2022-07-13 PROCEDURE — 1123F ACP DISCUSS/DSCN MKR DOCD: CPT | Performed by: SPECIALIST

## 2022-07-13 PROCEDURE — 1101F PT FALLS ASSESS-DOCD LE1/YR: CPT | Performed by: SPECIALIST

## 2022-07-13 PROCEDURE — G8536 NO DOC ELDER MAL SCRN: HCPCS | Performed by: SPECIALIST

## 2022-07-13 PROCEDURE — 99214 OFFICE O/P EST MOD 30 MIN: CPT | Performed by: SPECIALIST

## 2022-07-13 PROCEDURE — G8420 CALC BMI NORM PARAMETERS: HCPCS | Performed by: SPECIALIST

## 2022-07-13 PROCEDURE — G8432 DEP SCR NOT DOC, RNG: HCPCS | Performed by: SPECIALIST

## 2022-07-13 PROCEDURE — G0463 HOSPITAL OUTPT CLINIC VISIT: HCPCS | Performed by: SPECIALIST

## 2022-07-13 NOTE — PROGRESS NOTES
HISTORY OF PRESENT ILLNESS  Mckay Smith is a 80 y.o. female     SUMMARY:   Problem List  Date Reviewed: 7/13/2022          Codes Class Noted    Senile purpura ([facility] [address] ICD-10-CM: D69.2  ICD-9-CM: 287.2  5/23/2022        Abnormal EKG ICD-10-CM: R94.31  ICD-9-CM: 794.31  10/22/2021        Numbness ICD-10-CM: R20.0  ICD-9-CM: 782.0  5/5/2020        Herpes zoster without complication FZT-17-IQ: D40.1  ICD-9-CM: 053.9  5/15/2019        Intractable pain ICD-10-CM: R52  ICD-9-CM: 780.96  5/15/2019        Hyponatremia ICD-10-CM: E87.1  ICD-9-CM: 276.1  5/16/2017        Multiple drug allergies ICD-10-CM: Z88.9  ICD-9-CM: V14.9  Unknown        Environmental allergies ICD-10-CM: Z91.09  ICD-9-CM: V15.09  Unknown        Osteoporosis ICD-10-CM: M81.0  ICD-9-CM: 733.00  12/23/2013        GERD (gastroesophageal reflux disease) ICD-10-CM: K21.9  ICD-9-CM: 530.81  12/23/2013              Current Outpatient Medications on File Prior to Visit   Medication Sig    LORazepam (ATIVAN) 1 mg tablet Take 1 Tablet by mouth every four (4) hours as needed for Anxiety. Max Daily Amount: 6 mg.  predniSONE (DELTASONE) 20 mg tablet Take 3 Tablets by mouth daily (with breakfast) for 5 days.  traMADoL (ULTRAM) 50 mg tablet TAKE 1 TO 2 TABLETS BY MOUTH THREE TIMES DAILY AS NEEDED FOR PAIN    aspirin (ASPIRIN) 325 mg tablet Take 1 Tablet by mouth daily.  triamcinolone acetonide (KENALOG) 0.1 % topical cream Apply  to affected area two (2) times daily as needed for Skin Irritation. use thin layer    propranoloL (INDERAL) 10 mg tablet Take 1 Tab by mouth four (4) times daily as needed for Anxiety.  acetaminophen (TYLENOL) 325 mg tablet Take 325 mg by mouth every eight (8) hours.  therapeutic multivitamin (THERAGRAN) tablet Take 1 Tab by mouth daily.  CALCIUM CITRATE (CITRACAL PO) Take 1 Tab by mouth two (2) times a day.  polyethylene glycol (MIRALAX) 17 gram packet Take 17 g by mouth daily as needed.     docusate sodium (COLACE) 100 mg capsule Take 100 mg by mouth two (2) times daily as needed.  cholecalciferol, vitamin D3, 2,000 unit tab Take 1 Tab by mouth daily.  loteprednol etabonate (LOTEMAX) 0.5 % ophthalmic suspension Administer 1 Drop to both eyes daily. No current facility-administered medications on file prior to visit. CARDIOLOGY STUDIES TO DATE:  9/18 negative lexiscan  2/21 normal echo    Chief Complaint   Patient presents with    Follow-up     HPI :  She returns with concerns that she might have a cardiac issue going on. I saw her last fall for some shortness of breath and question of heart failure and all things considered that did not seem likely at all and by the time she got to me her symptoms were resolving. In May she had a steroid injection in her back and a few days later she says she started to have allergic reaction felt like her throat was swelling like someone had a hand around her neck and she was having difficulty breathing. She went to the emergency room a couple of times and that got better and then came back in early June with facial paresthesias again associated with the shortness of breath and tightness in her throat. Her work-up was negative at that point and etiology of her symptoms were unclear. Along the way she saw an allergist and she was tried on some inhalers which probably did not help much. She also got a course of steroids which initially might of helped but then about 2 weeks ago she developed a fairly persistent heaviness in her chest but never really went away. She had 1 day in the midst of all this where she felt completely normal.  Interestingly she has a history of vocal cord dysfunction. Along the way she had a CTA of the chest and a neck which showed only some cervical spine disease but no other significant abnormalities.   She has known GERD but says she has not taken pantoprazole for about 6 months because she has been able to control her symptoms with diet.  That being said she admits that her symptoms of get gotten worse recently because of some of the medication she has been placed on to try to help with her other symptoms. Somewhere along the way it was suggested that she might have pericarditis because on her CTA of the chest she had a trivial pericardial effusion. All the rest of her studies do not support pericarditis  CARDIAC ROS:   negative for palpitations, syncope, orthopnea, paroxysmal nocturnal dyspnea, exertional chest pressure/discomfort, claudication, lower extremity edema    Family History   Problem Relation Age of Onset    Heart Disease Mother     Cancer Father 61        lung    Cataract Daughter         congenital cataracts    Heart Disease Daughter         fast heart rythmn       Past Medical History:   Diagnosis Date    Diverticulitis     Dystrophy, cornea     Environmental allergies     Family history of skin cancer     GERD (gastroesophageal reflux disease)     Hypotension     Ill-defined condition     Hyponatremia    Menopause     LMP-unknown    Multiple drug allergies     Osteoporosis     [facility]    Shingl 05/2019    Sun-damaged skin     Vertebral compression fracture ([address] 01/05/12    Vocal cord anomaly     vocal cord dysfunction per speech therapist       GENERAL ROS:  A comprehensive review of systems was negative except for that written in the HPI.     Visit Vitals  /60   Pulse 94   Resp 16   Ht 5' 2\" (1.575 m)   Wt 133 lb (60.3 kg)   LMP  (LMP Unknown)   SpO2 96%   BMI 24.33 kg/m²       Wt Readings from Last 3 Encounters:   07/13/22 133 lb (60.3 kg)   07/08/22 130 lb (59 kg)   07/05/22 130 lb (59 kg)            BP Readings from Last 3 Encounters:   07/13/22 100/60   07/08/22 130/76   07/05/22 110/60       PHYSICAL EXAM  General appearance: alert, cooperative, no distress, appears stated age  Neurologic: Alert and oriented X 3  Neck: supple, symmetrical, trachea midline, no adenopathy, no carotid bruit and no JVD  Lungs: clear to auscultation bilaterally  Heart: regular rate and rhythm, S1, S2 normal, no murmur, click, rub or gallop  Extremities: extremities normal, atraumatic, no cyanosis or edema    Lab Results   Component Value Date/Time    Cholesterol, total 223 (H) 06/03/2022 03:05 AM    Cholesterol, total 205 (H) 03/14/2022 03:04 PM    Cholesterol, total 204 (H) 02/16/2021 09:01 AM    HDL Cholesterol 74 06/03/2022 03:05 AM    HDL Cholesterol 56 03/14/2022 03:04 PM    HDL Cholesterol 52 02/16/2021 09:01 AM    LDL, calculated 133.4 (H) 06/03/2022 03:05 AM    LDL, calculated 129 (H) 03/14/2022 03:04 PM    LDL, calculated 136.4 (H) 02/16/2021 09:01 AM    Triglyceride 78 06/03/2022 03:05 AM    Triglyceride 111 03/14/2022 03:04 PM    Triglyceride 78 02/16/2021 09:01 AM    CHOL/HDL Ratio 3.0 06/03/2022 03:05 AM    CHOL/HDL Ratio 3.9 02/16/2021 09:01 AM     ASSESSMENT :      I think it is highly unlikely that her symptoms are cardiac at this point given the duration the nature of her symptoms and her multiple ER valuations and hospitalizations with negative cardiac work-ups this seems extremely unlikely. I suspect she is either developing some sort of rheumatologic problem, she could have a neuromuscular disorder related to her vocal cord dysfunction or she could have esophageal disease or even silent reflux that could be causing some of the shortness of breath. Fortunately she is got an appointment with gastroenterologist a pulmonologist and an ENT in the next couple of weeks. I told her we could do a stress test on her just to be 100% sure it was not cardiac. Given her shortness of breath it would have to be a chemical stress test and she said she would never have 1 of those again based on her experience from a couple years ago. So at this point I would not recommend any further cardiac studies.   current treatment plan is effective, no change in therapy  lab results and schedule of future lab studies reviewed with patient  reviewed diet, exercise and weight control    Encounter Diagnoses   Name Primary?  Other chest pain Yes    SOB (shortness of breath)      No orders of the defined types were placed in this encounter. Follow-up and Dispositions    · Return if symptoms worsen or fail to improve. Edis George MD  7/13/2022  Please note that this dictation was completed with [employer], the computer voice recognition software. Quite often unanticipated grammatical, syntax, homophones, and other interpretive errors are inadvertently transcribed by the computer software. Please disregard these errors. Please excuse any errors that have escaped final proofreading. Thank you.

## 2022-07-14 ENCOUNTER — TELEPHONE (OUTPATIENT)
Dept: CARDIOLOGY CLINIC | Age: 82
End: 2022-07-14

## 2022-07-14 NOTE — TELEPHONE ENCOUNTER
Meño Mckeon RN  She would like her office visit sent to her PCP AND Dr Aman Kinney at [facility].  The phone number is [phone] :)      Office visits faxed to both docs, confirmation received

## 2022-08-15 ENCOUNTER — TELEPHONE (OUTPATIENT)
Dept: CARDIOLOGY CLINIC | Age: 82
End: 2022-08-15

## 2022-08-15 NOTE — TELEPHONE ENCOUNTER
Pt sees Dr aMk Malone from [facility] for osteoporosis.  Wanting to put patient on a new medication, but with the risk of a heart attack, wanting clearance from cardio first.   Does not know the name of the medication     Dr Mak Malone [phone]  Patient [phone]

## 2022-08-16 NOTE — TELEPHONE ENCOUNTER
Called pt. Verified patient's identity with two identifiers. Notified her of Dr. Joon Barroso message. Patient verbalized understanding and denied further questions or concerns.

## 2022-08-16 NOTE — TELEPHONE ENCOUNTER
Called pt. Verified patient's identity with two identifiers. Patient stated she forgot to write down name of drug, she called doctor's office and is waiting for call back. She said it is a new drug, not related to osteoporosis, derived from vaccine research though not a vaccine, and that being a heart attack or stroke risk patient is a contraindication to this new med. She said stroke was suspected with her in the past, but she thinks it is just because her memory is bad, and her neurologist cleared her for drug trial. She will call back with name and spelling of drug, which I will send to Dr. Carolann Ngo to advise if okay for patient to be part of trial from a cardiac standpoint. Patient verbalized understanding and denied further questions or concerns.

## 2022-08-17 ENCOUNTER — TELEPHONE (OUTPATIENT)
Dept: CARDIOLOGY CLINIC | Age: 82
End: 2022-08-17

## 2022-08-17 NOTE — TELEPHONE ENCOUNTER
Called pt. Verified patient's identity with two identifiers. Notified her Dr. Charlene Castro did review her chart and gave his reply with information he had. Reminded her the drug is new, at least to for this treatment dx, and I had not even heard of it. Patient verbalized understanding and denied further questions or concerns.

## 2022-08-17 NOTE — TELEPHONE ENCOUNTER
Pt called to speak with nurse about previous message as she asked if she could take a trial medication (evenity), pt would like to know if the doctor looked at her entire record and determined from that, please advise        [phone]

## 2022-08-31 ENCOUNTER — APPOINTMENT (OUTPATIENT)
Dept: GENERAL RADIOLOGY | Age: 82
End: 2022-08-31
Attending: EMERGENCY MEDICINE
Payer: MEDICARE

## 2022-08-31 ENCOUNTER — HOSPITAL ENCOUNTER (EMERGENCY)
Age: 82
Discharge: HOME | End: 2022-08-31
Attending: EMERGENCY MEDICINE
Payer: MEDICARE

## 2022-08-31 VITALS
SYSTOLIC BLOOD PRESSURE: 113 MMHG | HEART RATE: 70 BPM | RESPIRATION RATE: 16 BRPM | TEMPERATURE: 97.8 F | OXYGEN SATURATION: 94 % | DIASTOLIC BLOOD PRESSURE: 65 MMHG

## 2022-08-31 DIAGNOSIS — R07.9 CHEST PAIN, UNSPECIFIED TYPE: Primary | ICD-10-CM

## 2022-08-31 LAB
ALBUMIN SERPL-MCNC: 3.8 G/DL (ref 3.5–5)
ALBUMIN/GLOB SERPL: 1.1 {RATIO} (ref 1.1–2.2)
ALP SERPL-CCNC: 54 U/L (ref 45–117)
ALT SERPL-CCNC: 17 U/L (ref 12–78)
ANION GAP SERPL CALC-SCNC: 8 MMOL/L (ref 5–15)
AST SERPL-CCNC: 17 U/L (ref 15–37)
ATRIAL RATE: 83 BPM
BASOPHILS # BLD: 0 K/UL (ref 0–0.1)
BASOPHILS NFR BLD: 1 % (ref 0–1)
BILIRUB SERPL-MCNC: 0.4 MG/DL (ref 0.2–1)
BUN SERPL-MCNC: 14 MG/DL (ref 6–20)
BUN/CREAT SERPL: 22 (ref 12–20)
CALCIUM SERPL-MCNC: 9.1 MG/DL (ref 8.5–10.1)
CALCULATED P AXIS, ECG09: 65 DEGREES
CALCULATED R AXIS, ECG10: 88 DEGREES
CALCULATED T AXIS, ECG11: 50 DEGREES
CHLORIDE SERPL-SCNC: 102 MMOL/L (ref 97–108)
CO2 SERPL-SCNC: 26 MMOL/L (ref 21–32)
COMMENT, HOLDF: NORMAL
CREAT SERPL-MCNC: 0.65 MG/DL (ref 0.55–1.02)
DIAGNOSIS, 93000: NORMAL
DIFFERENTIAL METHOD BLD: ABNORMAL
EOSINOPHIL # BLD: 0.5 K/UL (ref 0–0.4)
EOSINOPHIL NFR BLD: 12 % (ref 0–7)
ERYTHROCYTE [DISTWIDTH] IN BLOOD BY AUTOMATED COUNT: 13.1 % (ref 11.5–14.5)
GLOBULIN SER CALC-MCNC: 3.4 G/DL (ref 2–4)
GLUCOSE SERPL-MCNC: 113 MG/DL (ref 65–100)
HCT VFR BLD AUTO: 35.5 % (ref 35–47)
HGB BLD-MCNC: 12.1 G/DL (ref 11.5–16)
IMM GRANULOCYTES # BLD AUTO: 0 K/UL (ref 0–0.04)
IMM GRANULOCYTES NFR BLD AUTO: 0 % (ref 0–0.5)
LYMPHOCYTES # BLD: 1 K/UL (ref 0.8–3.5)
LYMPHOCYTES NFR BLD: 26 % (ref 12–49)
MCH RBC QN AUTO: 30 PG (ref 26–34)
MCHC RBC AUTO-ENTMCNC: 34.1 G/DL (ref 30–36.5)
MCV RBC AUTO: 88.1 FL (ref 80–99)
MONOCYTES # BLD: 0.4 K/UL (ref 0–1)
MONOCYTES NFR BLD: 11 % (ref 5–13)
NEUTS SEG # BLD: 1.9 K/UL (ref 1.8–8)
NEUTS SEG NFR BLD: 50 % (ref 32–75)
NRBC # BLD: 0 K/UL (ref 0–0.01)
NRBC BLD-RTO: 0 PER 100 WBC
P-R INTERVAL, ECG05: 148 MS
PLATELET # BLD AUTO: 194 K/UL (ref 150–400)
PMV BLD AUTO: 9.9 FL (ref 8.9–12.9)
POTASSIUM SERPL-SCNC: 3.7 MMOL/L (ref 3.5–5.1)
PROT SERPL-MCNC: 7.2 G/DL (ref 6.4–8.2)
Q-T INTERVAL, ECG07: 408 MS
QRS DURATION, ECG06: 120 MS
QTC CALCULATION (BEZET), ECG08: 479 MS
RBC # BLD AUTO: 4.03 M/UL (ref 3.8–5.2)
SAMPLES BEING HELD,HOLD: NORMAL
SODIUM SERPL-SCNC: 136 MMOL/L (ref 136–145)
TROPONIN-HIGH SENSITIVITY: 6 NG/L (ref 0–51)
TROPONIN-HIGH SENSITIVITY: 6 NG/L (ref 0–51)
VENTRICULAR RATE, ECG03: 83 BPM
WBC # BLD AUTO: 3.7 K/UL (ref 3.6–11)

## 2022-08-31 PROCEDURE — 71045 X-RAY EXAM CHEST 1 VIEW: CPT

## 2022-08-31 PROCEDURE — 36415 COLL VENOUS BLD VENIPUNCTURE: CPT

## 2022-08-31 PROCEDURE — 84484 ASSAY OF TROPONIN QUANT: CPT

## 2022-08-31 PROCEDURE — 80053 COMPREHEN METABOLIC PANEL: CPT

## 2022-08-31 PROCEDURE — 85025 COMPLETE CBC W/AUTO DIFF WBC: CPT

## 2022-08-31 PROCEDURE — 93005 ELECTROCARDIOGRAM TRACING: CPT

## 2022-08-31 PROCEDURE — 99285 EMERGENCY DEPT VISIT HI MDM: CPT

## 2022-08-31 NOTE — ED TRIAGE NOTES
Patient arrives ambulatory  from home with CC of chest pain and right sided jaw pain that woke her from sleep this morning. Patient also states she had an Evenity injection yesterday and now has a rash at the injection site.

## 2022-08-31 NOTE — ED PROVIDER NOTES
Date of Service:  08/31/22      Patient:  Ele Scales    Chief Complaint:  Chest Pain (Angina)       HPI:  Ele Scales is a 80 y.o.  female who presents for evaluation of chest pain. Patient had a bone density shot given yesterday around noon. She been doing well since. This morning she woke up about 45 minutes prior to arrival with midsternal nonradiating 7 out of 10 chest pressure with associated jaw discomfort as well as some dyspnea. No history of the like. She denies other systemic symptoms. Currently she is feeling slightly better. No lightheadedness or dizziness. No diaphoresis.   No other acute complaints       Past Medical History:   Diagnosis Date    Diverticulitis     Dystrophy, cornea     Environmental allergies     Family history of skin cancer     GERD (gastroesophageal reflux disease)     Hypotension     Ill-defined condition     Hyponatremia    Menopause     LMP-unknown    Multiple drug allergies     Osteoporosis     Dr. 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  [facility]      HX CATARACT REMOVAL  07/2012    HX GYN      D&C    HX MOHS PROCEDURES  04/11/2017    [facility] left cheek by Dr. Trevin Tucker         Family History:   Problem Relation Age of Onset    Heart Disease Mother     Cancer Father 61        lung    Cataract Daughter         congenital cataracts    Heart Disease Daughter         fast heart rythmn       Social History     Socioeconomic History    Marital status:      Spouse name: Not on file    Number of children: Not on file    Years of education: Not on file    Highest education level: Not on file   Occupational History    Not on file   Tobacco Use    Smoking status: Never    Smokeless tobacco: Never   Substance and Sexual Activity    Alcohol use: No    Drug use: No    Sexual activity: Not on file   Other Topics Concern    Not on file   Social History Narrative    Not on file     Social Determinants of Health     Financial Resource Strain: Not on file   Food Insecurity: Not on file   Transportation Needs: Not on file   Physical Activity: Not on file   Stress: Not on file   Social Connections: Not on file   Intimate Partner Violence: Not on file   Housing Stability: Not on file         ALLERGIES: Clindamycin, Ketek [telithromycin], Levaquin [levofloxacin], Nexium [esomeprazole magnesium], Sudafed [pseudoephedrine hcl], Voltaren [diclofenac sodium], Xyzal [levocetirizine], Ceftin [cefuroxime axetil], Cortisone, Methylprednisolone, Mobic [meloxicam], Vibramycin [doxycycline calcium], Afrin [pseudoephedrine sulfate], Antihistamine-1, Azithromycin, Bactrim [sulfamethoprim ds], Biaxin [clarithromycin], Ciprofloxacin, Codeine, Doxycycline, Flagyl [metronidazole], Gabapentin, Pcn [penicillins], and Restasis [cyclosporine]    Review of Systems   Respiratory:  Positive for chest tightness. Cardiovascular:  Positive for chest pain. All other systems reviewed and are negative. There were no vitals filed for this visit. Physical Exam  Vitals and nursing note reviewed. Exam conducted with a chaperone present. Constitutional:       General: She is not in acute distress. Appearance: She is well-developed. She is not ill-appearing. HENT:      Head: Normocephalic and atraumatic. Nose: Nose normal.      Mouth/Throat:      Mouth: Mucous membranes are moist.   Eyes:      General: No scleral icterus. Neck:      Vascular: No JVD. Trachea: No tracheal deviation. Cardiovascular:      Rate and Rhythm: Normal rate and regular rhythm. Heart sounds: No murmur heard. Pulmonary:      Effort: Pulmonary effort is normal. No respiratory distress.    Abdominal: General: Abdomen is flat. There is no distension. Palpations: Abdomen is soft. Musculoskeletal:         General: No deformity. Skin:     General: Skin is warm. Capillary Refill: Capillary refill takes less than 2 seconds. Findings: No rash. Neurological:      Mental Status: She is alert and oriented to person, place, and time. Psychiatric:         Mood and Affect: Mood normal.         Behavior: Behavior normal.        MDM     Amount and/or Complexity of Data Reviewed  Decide to obtain previous medical records or to obtain history from someone other than the patient: yes      ED Course as of 08/31/22 0505   Wed Aug 31, 2022   0218 EKG 0208  Normal sinus rhythm at 83 bpm with a normal axis. Right bundle branch block pattern.   No STEMI [GG]      ED Course User Index  [GG] Jose M Jordan DO     VITAL SIGNS:  Patient Vitals for the past 4 hrs:   Temp Pulse Resp BP SpO2   08/31/22 0231 98.3 °F (36.8 °C) 80 21 135/79 92 %         LABS:  Recent Results (from the past 6 hour(s))   EKG, 12 LEAD, INITIAL    Collection Time: 08/31/22  2:08 AM   Result Value Ref Range    Ventricular Rate 83 BPM    Atrial Rate 83 BPM    P-R Interval 148 ms    QRS Duration 120 ms    Q-T Interval 408 ms    QTC Calculation (Bezet) 479 ms    Calculated P Axis 65 degrees    Calculated R Axis 88 degrees    Calculated T Axis 50 degrees    Diagnosis       Normal sinus rhythm  Low voltage QRS  Right bundle branch block  Abnormal ECG  When compared with ECG of 03-JUL-2022 21:49,  premature ventricular complexes are no longer present     CBC WITH AUTOMATED DIFF    Collection Time: 08/31/22  2:19 AM   Result Value Ref Range    WBC 3.7 3.6 - 11.0 K/uL    RBC 4.03 3.80 - 5.20 M/uL    HGB 12.1 11.5 - 16.0 g/dL    HCT 35.5 35.0 - 47.0 %    MCV 88.1 80.0 - 99.0 FL    MCH 30.0 26.0 - 34.0 PG    MCHC 34.1 30.0 - 36.5 g/dL    RDW 13.1 11.5 - 14.5 %    PLATELET 606 661 - 947 K/uL    MPV 9.9 8.9 - 12.9 FL    NRBC 0.0 0  WBC ABSOLUTE NRBC 0.00 0.00 - 0.01 K/uL    NEUTROPHILS 50 32 - 75 %    LYMPHOCYTES 26 12 - 49 %    MONOCYTES 11 5 - 13 %    EOSINOPHILS 12 (H) 0 - 7 %    BASOPHILS 1 0 - 1 %    IMMATURE GRANULOCYTES 0 0.0 - 0.5 %    ABS. NEUTROPHILS 1.9 1.8 - 8.0 K/UL    ABS. LYMPHOCYTES 1.0 0.8 - 3.5 K/UL    ABS. MONOCYTES 0.4 0.0 - 1.0 K/UL    ABS. EOSINOPHILS 0.5 (H) 0.0 - 0.4 K/UL    ABS. BASOPHILS 0.0 0.0 - 0.1 K/UL    ABS. IMM. GRANS. 0.0 0.00 - 0.04 K/UL    DF AUTOMATED     METABOLIC PANEL, COMPREHENSIVE    Collection Time: 08/31/22  2:19 AM   Result Value Ref Range    Sodium 136 136 - 145 mmol/L    Potassium 3.7 3.5 - 5.1 mmol/L    Chloride 102 97 - 108 mmol/L    CO2 26 21 - 32 mmol/L    Anion gap 8 5 - 15 mmol/L    Glucose 113 (H) 65 - 100 mg/dL    BUN 14 6 - 20 MG/DL    Creatinine 0.65 0.55 - 1.02 MG/DL    BUN/Creatinine ratio 22 (H) 12 - 20      GFR est AA >60 >60 ml/min/1.73m2    GFR est non-AA >60 >60 ml/min/1.73m2    Calcium 9.1 8.5 - 10.1 MG/DL    Bilirubin, total 0.4 0.2 - 1.0 MG/DL    ALT (SGPT) 17 12 - 78 U/L    AST (SGOT) 17 15 - 37 U/L    Alk. phosphatase 54 45 - 117 U/L    Protein, total 7.2 6.4 - 8.2 g/dL    Albumin 3.8 3.5 - 5.0 g/dL    Globulin 3.4 2.0 - 4.0 g/dL    A-G Ratio 1.1 1.1 - 2.2     SAMPLES BEING HELD    Collection Time: 08/31/22  2:19 AM   Result Value Ref Range    SAMPLES BEING HELD 1RED,1BLUE     COMMENT        Add-on orders for these samples will be processed based on acceptable specimen integrity and analyte stability, which may vary by analyte. TROPONIN-HIGH SENSITIVITY    Collection Time: 08/31/22  2:19 AM   Result Value Ref Range    Troponin-High Sensitivity 6 0 - 51 ng/L   TROPONIN-HIGH SENSITIVITY    Collection Time: 08/31/22  4:09 AM   Result Value Ref Range    Troponin-High Sensitivity 6 0 - 51 ng/L        IMAGING:  XR CHEST PORT   Final Result   No acute process.             Medications During Visit:  Medications   mylanta/viscous lidocaine (GI COCKTAIL) (0 mL Oral Held 8/31/22 1852) DECISION MAKING:  Mana Resendiz is a 80 y.o. female who comes in as above. Here, patient appears well. No longer having any type of chest pain. Diagnostics unremarkable. Could be side effect from the new medication that she took. Her main complaint now is still right-sided jaw pain but notes that one of the known side effects is severe jaw discomfort. No signs of cardiac disease. Will discharge home with outpatient follow-up and return instructions      IMPRESSION:  1. Chest pain, unspecified type        DISPOSITION:  Discharged      Current Discharge Medication List           Follow-up Information       Follow up With Specialties Details Why Contact Info    Aguilar Sinclair MD Family Medicine, Emergency Medicine Schedule an appointment as soon as possible for a visit   [facility] [address]   [address] [facility] [address], [facility] Emergency Medicine Go to  If symptoms worsen, As needed [address] [place] [address]  [phone]              The patient is asked to follow-up with their primary care provider in the next several days. They are to call tomorrow for an appointment. The patient is asked to return promptly for any increased concerns or worsening of symptoms. They can return to this emergency department or any other emergency department.       Procedures

## 2022-11-17 ENCOUNTER — OFFICE VISIT (OUTPATIENT)
Dept: ORTHOPEDIC SURGERY | Age: 82
End: 2022-11-17
Payer: MEDICARE

## 2022-11-17 DIAGNOSIS — M67.911 TENDINOPATHY OF RIGHT ROTATOR CUFF: Primary | ICD-10-CM

## 2022-11-17 NOTE — LETTER
11/17/2022    Patient: Lizett Low   YOB: 1940   Date of Visit: 11/17/2022     [address] She[address]    Dear Gail Prasad MD,      Thank you for referring Ms. Isabel Salcedo to [facility] for evaluation. My notes for this consultation are attached. If you have questions, please do not hesitate to call me. I look forward to following your patient along with you.       Sincerely,    Aniceto Norman MD

## 2022-11-17 NOTE — PROGRESS NOTES
Sekou Lynn (: 1940) is a 80 y.o. female, patient, here for evaluation of the following chief complaint(s):  Shoulder Pain (Right shoulder )       HPI:    Patient returns to the office with recurrent discomfort of the right shoulder. This is a patient I saw back in May. At that time, she had procured Depo-Medrol which has no additives. She was told she has an allergy to the additives. A subacromial injection was performed and she states it did help her. However, she is here today with recurrent shoulder pain. In preparation she has brought in her own Depo-Medrol. She does state that she has discomfort not only up and down the shoulder but also has some pain that radiates all the way down to the hand describes even some numbness. She is talk to me about this before in the past.  I have made recommendations for EMG and neurological evaluation.     Allergies   Allergen Reactions    Clindamycin Anaphylaxis    Ketek [Telithromycin] Anaphylaxis    Levaquin [Levofloxacin] Anaphylaxis    Nexium [Esomeprazole Magnesium] Anaphylaxis    Sudafed [Pseudoephedrine Hcl] Anaphylaxis    Voltaren [Diclofenac Sodium] Other (comments)     Mild throat closing and severe nausea    Xyzal [Levocetirizine] Anaphylaxis    Ceftin [Cefuroxime Axetil] Unknown (comments)    Cortisone Rash     Can take depro medrol if preservative free only    Methylprednisolone Rash     Facial rash    Mobic [Meloxicam] Other (comments)     Throat closing, severe nausea, abd pain and facial swelling    Vibramycin [Doxycycline Calcium] Rash     Facial rash    Afrin [Pseudoephedrine Sulfate] Other (comments)     Facial numbness that lasted 45 minutes during testing    Antihistamine-1 Anaphylaxis    Azithromycin Other (comments)     \"neck stiffness & face numbness & swelling\"    Bactrim [Sulfamethoprim Ds] Rash     Facial rash and swelling    Biaxin [Clarithromycin] Rash     Severe facial rash and swelling    Ciprofloxacin Other (comments) \"numbness and tingling in foot & leg\"    Codeine Angioedema    Doxycycline Anaphylaxis    Flagyl [Metronidazole] Other (comments)     Reports on the 7th day of taking it \"I had SEVERE Cranial pain that was not a headache\"     Gabapentin Other (comments)    Pcn [Penicillins] Anaphylaxis    Restasis [Cyclosporine] Angioedema       Current Outpatient Medications   Medication Sig    predniSONE (DELTASONE) 20 mg tablet Take 3 Tablets by mouth daily (with breakfast). LORazepam (ATIVAN) 1 mg tablet Take 1 Tablet by mouth every four (4) hours as needed for Anxiety. Max Daily Amount: 6 mg.    aspirin (ASPIRIN) 325 mg tablet Take 1 Tablet by mouth daily. triamcinolone acetonide (KENALOG) 0.1 % topical cream Apply  to affected area two (2) times daily as needed for Skin Irritation. use thin layer    propranoloL (INDERAL) 10 mg tablet Take 1 Tab by mouth four (4) times daily as needed for Anxiety. acetaminophen (TYLENOL) 325 mg tablet Take 325 mg by mouth every eight (8) hours. therapeutic multivitamin (THERAGRAN) tablet Take 1 Tab by mouth daily. CALCIUM CITRATE (CITRACAL PO) Take 1 Tab by mouth two (2) times a day. polyethylene glycol (MIRALAX) 17 gram packet Take 17 g by mouth daily as needed. docusate sodium (COLACE) 100 mg capsule Take 100 mg by mouth two (2) times daily as needed. cholecalciferol, vitamin D3, 2,000 unit tab Take 1 Tab by mouth daily. loteprednol etabonate (LOTEMAX) 0.5 % ophthalmic suspension Administer 1 Drop to both eyes daily. No current facility-administered medications for this visit.        Past Medical History:   Diagnosis Date    Diverticulitis     Dystrophy, cornea     Environmental allergies     Family history of skin cancer     GERD (gastroesophageal reflux disease)     Hypotension     Ill-defined condition     Hyponatremia    Menopause     LMP-unknown    Multiple drug allergies     Osteoporosis     Dr. 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 [address]      HX CATARACT REMOVAL  07/2012    HX GYN      D&C    HX MOHS PROCEDURES  04/11/2017    [facility] left cheek by Dr. Min Pang History   Problem Relation Age of Onset    Heart Disease Mother     Cancer Father 61        lung    Cataract Daughter         congenital cataracts    Heart Disease Daughter         fast heart rythmn        Social History     Socioeconomic History    Marital status:      Spouse name: Not on file    Number of children: Not on file    Years of education: Not on file    Highest education level: Not on file   Occupational History    Not on file   Tobacco Use    Smoking status: Never    Smokeless tobacco: Never   Substance and Sexual Activity    Alcohol use: No    Drug use: No    Sexual activity: Not on file   Other Topics Concern    Not on file   Social History Narrative    Not on file     Social Determinants of Health     Financial Resource Strain: Not on file   Food Insecurity: Not on file   Transportation Needs: Not on file   Physical Activity: Not on file   Stress: Not on file   Social Connections: Not on file   Intimate Partner Violence: Not on file   Housing Stability: Not on file       Review of Systems   Musculoskeletal:         Right shoulder pain      Vitals:  LMP  (LMP Unknown)    There is no height or weight on file to calculate BMI. Ortho Exam     Right shoulder: Age-related shoulder girdle atrophy. There is no soft tissue swelling, ecchymosis, abrasions or lacerations. Active range of motion of the shoulder is limited to 130° of forward flexion, 120° of lateral abduction and 70° of external rotation. Internal rotation is to the SI joint and is painful.   Passive range of motion is full with a painful impingement sign and painful Castillo sign. Rotator cuff strength is painful to evaluate and is at 4+/5 with forward flexion and lateral abduction. External rotational strength is maintained. There is no crepitation about the joint. Palpation of the [facility] joint does not reproduce discomfort and there is no pain elicited with cross-body adduction. Strength of the extremity is 5/5 strength at biceps/triceps/wrist extension and is comparable to the contralateral side. Neurovascular examination is intact. Patient moves digits distally with normal strength. I reviewed the patient's prior MRI. She has evidence of rotator cuff tendinopathy      ASSESSMENT/PLAN:    With the patient does have shoulder pain, I do not believe all of her arm pain is consistent with rotator cuff tendinopathy. Patient understands this and I would therefore recommend a referral onto a neurologist.  In regards to treatment for the shoulder, I have presented many options and she is elected to proceed once again today with a cortisone injection. I think this is reasonable and appropriate. Consent for the injection was obtained. Risk of postinjection infection, lack of improvement, hypopigmentation and unusual allergic reaction were explained to the patient. After consent, the skin was sterilely prepped and 80 mg of Depo-Medrol and 5 cc of 0.25% plain Marcaine was was injected in the right shoulder. Patient had no complications. Patient is to ice modify activities for 24 hours.   Patient is to return to the office if no improvement        Carlos Balderrama MD

## 2023-02-27 ENCOUNTER — TRANSCRIBE ORDER (OUTPATIENT)
Dept: SCHEDULING | Age: 83
End: 2023-02-27

## 2023-02-27 DIAGNOSIS — R68.84 JAW PAIN: Primary | ICD-10-CM

## 2023-02-27 DIAGNOSIS — M79.11 MASTICATORY MYALGIA: ICD-10-CM

## 2023-03-02 ENCOUNTER — OFFICE VISIT (OUTPATIENT)
Dept: ORTHOPEDIC SURGERY | Age: 83
End: 2023-03-02

## 2023-03-02 DIAGNOSIS — M25.511 ACUTE PAIN OF RIGHT SHOULDER: Primary | ICD-10-CM

## 2023-03-02 DIAGNOSIS — M19.011 PRIMARY OSTEOARTHRITIS OF RIGHT SHOULDER: ICD-10-CM

## 2023-03-02 DIAGNOSIS — M67.911 TENDINOPATHY OF RIGHT ROTATOR CUFF: ICD-10-CM

## 2023-03-02 NOTE — PROGRESS NOTES
John Reeves (: 1940) is a 80 y.o. female, patient, here for evaluation of the following chief complaint(s):  Shoulder Pain (Right shoulder )       HPI:    Patient returns to the office with recurrent discomfort of the right shoulder. This patient have seen the office before in the past.  She is describing a very similar level of pain along her shoulder and down into her bicep region. However, she describes pain that seems to be a little bit higher in her shoulder.     Allergies   Allergen Reactions    Clindamycin Anaphylaxis    Ketek [Telithromycin] Anaphylaxis    Levaquin [Levofloxacin] Anaphylaxis    Nexium [Esomeprazole Magnesium] Anaphylaxis    Sudafed [Pseudoephedrine Hcl] Anaphylaxis    Voltaren [Diclofenac Sodium] Other (comments)     Mild throat closing and severe nausea    Xyzal [Levocetirizine] Anaphylaxis    Ceftin [Cefuroxime Axetil] Unknown (comments)    Cortisone Rash     Can take depro medrol if preservative free only    Methylprednisolone Rash     Facial rash    Mobic [Meloxicam] Other (comments)     Throat closing, severe nausea, abd pain and facial swelling    Vibramycin [Doxycycline Calcium] Rash     Facial rash    Afrin [Pseudoephedrine Sulfate] Other (comments)     Facial numbness that lasted 45 minutes during testing    Antihistamine-1 Anaphylaxis    Azithromycin Other (comments)     \"neck stiffness & face numbness & swelling\"    Bactrim [Sulfamethoprim Ds] Rash     Facial rash and swelling    Biaxin [Clarithromycin] Rash     Severe facial rash and swelling    Ciprofloxacin Other (comments)     \"numbness and tingling in foot & leg\"    Codeine Angioedema    Doxycycline Anaphylaxis    Flagyl [Metronidazole] Other (comments)     Reports on the 7th day of taking it \"I had SEVERE Cranial pain that was not a headache\"     Gabapentin Other (comments)    Pcn [Penicillins] Anaphylaxis    Restasis [Cyclosporine] Angioedema       Current Outpatient Medications   Medication Sig    traMADoL (ULTRAM) 50 mg tablet Take 1 Tablet by mouth every six (6) hours as needed for Pain for up to 30 days. Max Daily Amount: 200 mg.    nystatin (MYCOSTATIN) powder Apply  to affected area four (4) times daily. predniSONE (DELTASONE) 20 mg tablet Take 3 Tablets by mouth daily (with breakfast). LORazepam (ATIVAN) 1 mg tablet Take 1 Tablet by mouth every four (4) hours as needed for Anxiety. Max Daily Amount: 6 mg.    aspirin (ASPIRIN) 325 mg tablet Take 1 Tablet by mouth daily. triamcinolone acetonide (KENALOG) 0.1 % topical cream Apply  to affected area two (2) times daily as needed for Skin Irritation. use thin layer    propranoloL (INDERAL) 10 mg tablet Take 1 Tab by mouth four (4) times daily as needed for Anxiety. acetaminophen (TYLENOL) 325 mg tablet Take 325 mg by mouth every eight (8) hours. therapeutic multivitamin (THERAGRAN) tablet Take 1 Tab by mouth daily. CALCIUM CITRATE (CITRACAL PO) Take 1 Tab by mouth two (2) times a day. polyethylene glycol (MIRALAX) 17 gram packet Take 17 g by mouth daily as needed. docusate sodium (COLACE) 100 mg capsule Take 100 mg by mouth two (2) times daily as needed. cholecalciferol, vitamin D3, 2,000 unit tab Take 1 Tab by mouth daily. loteprednol etabonate (LOTEMAX) 0.5 % ophthalmic suspension Administer 1 Drop to both eyes daily. No current facility-administered medications for this visit.        Past Medical History:   Diagnosis Date    Diverticulitis     Dystrophy, cornea     Environmental allergies     Family history of skin cancer     GERD (gastroesophageal reflux disease)     Hypotension     Ill-defined condition     Hyponatremia    Menopause     LMP-unknown    Multiple drug allergies     Osteoporosis     Dr. 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 Deidre Gay History   Problem Relation Age of Onset    Heart Disease Mother     Cancer Father 61        lung    Cataract Daughter         congenital cataracts    Heart Disease Daughter         fast heart rythmn        Social History     Socioeconomic History    Marital status:      Spouse name: Not on file    Number of children: Not on file    Years of education: Not on file    Highest education level: Not on file   Occupational History    Not on file   Tobacco Use    Smoking status: Never    Smokeless tobacco: Never   Substance and Sexual Activity    Alcohol use: No    Drug use: No    Sexual activity: Not on file   Other Topics Concern    Not on file   Social History Narrative    Not on file     Social Determinants of Health     Financial Resource Strain: Not on file   Food Insecurity: Not on file   Transportation Needs: Not on file   Physical Activity: Not on file   Stress: Not on file   Social Connections: Not on file   Intimate Partner Violence: Not on file   Housing Stability: Not on file       Review of Systems   Musculoskeletal:         Right shoulder      Vitals:  LMP  (LMP Unknown)    There is no height or weight on file to calculate BMI. Ortho Exam           Patient is alert and oriented x3. Patient is in no acute distress. Patient ambulates with a nonantalgic gait. Right shoulder: No atrophy is noted. There is no evidence of bicep damage to the shoulder. She has about 120 degrees of forward elevation but uses her other hand to help elevate. Passively she has full range of motion. She has no crepitation. Her strength is hard to ascertain because of her pain. There is no swelling noted distally.   Neurovascular examination is intact. ASSESSMENT/PLAN:    We have talked treatment options moving forward. She has had cortisone before in the past.  She also brings her own cortisone to the appointment. She does not want to consider cortisone today. The other option would be to proceed with an MRI. She does have some structural issues with her shoulder of which we have talked about before. It would be reasonable to entertain another MRI. She agrees with this.   She is to return to the office after the Mark Whelan MD

## 2023-03-02 NOTE — LETTER
3/2/2023    Patient: Johan Lee   YOB: 1940   Date of Visit: 3/2/2023     [address]as [address] [place]    Dear Mirtha Fry MD,      Thank you for referring Ms. Nick Drake to [facility] for evaluation. My notes for this consultation are attached. If you have questions, please do not hesitate to call me. I look forward to following your patient along with you.       Sincerely,    Nicolas Meza MD

## 2023-03-06 ENCOUNTER — HOSPITAL ENCOUNTER (OUTPATIENT)
Dept: CT IMAGING | Age: 83
Discharge: HOME | End: 2023-03-06
Attending: ORAL & MAXILLOFACIAL SURGERY
Payer: MEDICARE

## 2023-03-06 DIAGNOSIS — M79.11 MASTICATORY MYALGIA: ICD-10-CM

## 2023-03-06 DIAGNOSIS — R68.84 JAW PAIN: ICD-10-CM

## 2023-03-06 PROCEDURE — 70486 CT MAXILLOFACIAL W/O DYE: CPT

## 2023-03-23 ENCOUNTER — OFFICE VISIT (OUTPATIENT)
Dept: ORTHOPEDIC SURGERY | Age: 83
End: 2023-03-23

## 2023-03-23 VITALS — WEIGHT: 130 LBS | HEIGHT: 62 IN | BODY MASS INDEX: 23.92 KG/M2

## 2023-03-23 DIAGNOSIS — M67.911 TENDINOPATHY OF ROTATOR CUFF, RIGHT: Primary | ICD-10-CM

## 2023-03-23 DIAGNOSIS — M19.011 PRIMARY OSTEOARTHRITIS OF RIGHT SHOULDER: ICD-10-CM

## 2023-03-23 RX ORDER — LANSOPRAZOLE 30 MG/1
CAPSULE, DELAYED RELEASE ORAL
COMMUNITY
Start: 2023-03-03

## 2023-03-23 RX ORDER — EPINEPHRINE 0.3 MG/.3ML
INJECTION SUBCUTANEOUS
COMMUNITY
Start: 2022-05-17

## 2023-03-23 NOTE — PROGRESS NOTES
Monserrat Alberto (: 1940) is a 80 y.o. female, patient, here for evaluation of the following chief complaint(s):  Shoulder Pain (right)       HPI:    Patient returns to the office with recurrent discomfort of the right shoulder. This is a patient who recently had an MRI. She once again continues to have a very similar level of pain of her right shoulder. She has had no change in her symptoms.     Allergies   Allergen Reactions    Clindamycin Anaphylaxis    Ketek [Telithromycin] Anaphylaxis    Levaquin [Levofloxacin] Anaphylaxis    Nexium [Esomeprazole Magnesium] Anaphylaxis    Sudafed [Pseudoephedrine Hcl] Anaphylaxis    Voltaren [Diclofenac Sodium] Other (comments)     Mild throat closing and severe nausea    Xyzal [Levocetirizine] Anaphylaxis    Ceftin [Cefuroxime Axetil] Unknown (comments)    Cortisone Rash     Can take depro medrol if preservative free only    Methylprednisolone Rash     Facial rash    Mobic [Meloxicam] Other (comments)     Throat closing, severe nausea, abd pain and facial swelling    Vibramycin [Doxycycline Calcium] Rash     Facial rash    Afrin [Pseudoephedrine Sulfate] Other (comments)     Facial numbness that lasted 45 minutes during testing    Antihistamine-1 Anaphylaxis    Azithromycin Other (comments)     \"neck stiffness & face numbness & swelling\"    Bactrim [Sulfamethoprim Ds] Rash     Facial rash and swelling    Biaxin [Clarithromycin] Rash     Severe facial rash and swelling    Ciprofloxacin Other (comments)     \"numbness and tingling in foot & leg\"    Codeine Angioedema    Doxycycline Anaphylaxis    Flagyl [Metronidazole] Other (comments)     Reports on the 7th day of taking it \"I had SEVERE Cranial pain that was not a headache\"     Gabapentin Other (comments)    Pcn [Penicillins] Anaphylaxis    Restasis [Cyclosporine] Angioedema       Current Outpatient Medications   Medication Sig    lansoprazole (PREVACID) 30 mg capsule TAKE 1 CAPSULE BY MOUTH TWICE A DAY AS DIRECTED EPINEPHrine (EPIPEN) 0.3 mg/0.3 mL injection INJECT 0.3 ML IN THE MUSCLE ONCE AS NEEDED FOR ALLERGIC RESPONSE FOR UP TO 1 DOSE    pregabalin (LYRICA) 75 mg capsule Take 1 Capsule by mouth nightly. Max Daily Amount: 75 mg.    nystatin (MYCOSTATIN) powder Apply  to affected area four (4) times daily. predniSONE (DELTASONE) 20 mg tablet Take 3 Tablets by mouth daily (with breakfast). LORazepam (ATIVAN) 1 mg tablet Take 1 Tablet by mouth every four (4) hours as needed for Anxiety. Max Daily Amount: 6 mg.    aspirin (ASPIRIN) 325 mg tablet Take 1 Tablet by mouth daily. triamcinolone acetonide (KENALOG) 0.1 % topical cream Apply  to affected area two (2) times daily as needed for Skin Irritation. use thin layer    propranoloL (INDERAL) 10 mg tablet Take 1 Tab by mouth four (4) times daily as needed for Anxiety. acetaminophen (TYLENOL) 325 mg tablet Take 325 mg by mouth every eight (8) hours. therapeutic multivitamin (THERAGRAN) tablet Take 1 Tab by mouth daily. CALCIUM CITRATE (CITRACAL PO) Take 1 Tab by mouth two (2) times a day. polyethylene glycol (MIRALAX) 17 gram packet Take 17 g by mouth daily as needed. docusate sodium (COLACE) 100 mg capsule Take 100 mg by mouth two (2) times daily as needed. cholecalciferol, vitamin D3, 2,000 unit tab Take 1 Tab by mouth daily. loteprednol etabonate (LOTEMAX) 0.5 % ophthalmic suspension Administer 1 Drop to both eyes daily. No current facility-administered medications for this visit.        Past Medical History:   Diagnosis Date    Diverticulitis     Dystrophy, cornea     Environmental allergies     Family history of skin cancer     GERD (gastroesophageal reflux disease)     Hypotension     Ill-defined condition     Hyponatremia    Menopause     LMP-unknown    Multiple drug allergies     Osteoporosis     Dr. 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 left cheek by Dr. Gandhi Poster History   Problem Relation Age of Onset    Heart Disease Mother     Cancer Father 61        lung    Cataract Daughter         congenital cataracts    Heart Disease Daughter         fast heart rythmn        Social History     Socioeconomic History    Marital status:      Spouse name: Not on file    Number of children: Not on file    Years of education: Not on file    Highest education level: Not on file   Occupational History    Not on file   Tobacco Use    Smoking status: Never    Smokeless tobacco: Never   Substance and Sexual Activity    Alcohol use: No    Drug use: No    Sexual activity: Not on file   Other Topics Concern    Not on file   Social History Narrative    Not on file     Social Determinants of Health     Financial Resource Strain: Not on file   Food Insecurity: Not on file   Transportation Needs: Not on file   Physical Activity: Not on file   Stress: Not on file   Social Connections: Not on file   Intimate Partner Violence: Not on file   Housing Stability: Not on file       Review of Systems    Vitals:  Ht 5' 2\" (1.575 m)   Wt 130 lb (59 kg)   LMP  (LMP Unknown)   BMI 23.78 kg/m²    Body mass index is 23.78 kg/m². Ortho Exam        Patient is alert and oriented x3. Patient is in no acute distress. Patient ambulates with a nonantalgic gait. Right shoulder: No atrophy is noted. There is no evidence of bicep damage to the shoulder. She has about 120 degrees of forward elevation but uses her other hand to help elevate. Passively she has full range of motion. She has no crepitation.   Her strength is hard to ascertain because of her pain.  However, she at least has 3+/5 strength throughout. There is no swelling noted distally. Neurovascular examination is intact. MRI demonstrates evidence of glenohumeral arthritis mild to moderate. She also has bicep pathology and labral pathology that would all be consistent with osteoarthritis. She has thickening of the supraspinatus and infraspinatus in the form of tendinopathy with partial-thickness changes. ASSESSMENT/PLAN:    I have gone over the MRI with the patient. This is similar to an MRI that was performed quite some time ago. Instead of one major issue she has multiple smaller issues. I have talked to her about this. She and I both agree surgical intervention is unnecessary and inappropriate at this stage. We have talked about other modalities to handle her pain. She is going to consider CBD oil. She is allergic to Depo-Medrol but she apparently has access to finding a Medrol Dosepak that she is not allergic to. She has intermittently brought this into the office for me to inject of which I feel is appropriate.   Patient is to return to the office as needed        Nicolas Meza MD

## 2023-10-05 ENCOUNTER — HOSPITAL ENCOUNTER (OUTPATIENT)
Facility: HOSPITAL | Age: 83
Discharge: HOME | End: 2023-10-05
Payer: MEDICARE

## 2023-10-05 ENCOUNTER — TRANSCRIBE ORDERS (OUTPATIENT)
Facility: HOSPITAL | Age: 83
End: 2023-10-05

## 2023-10-05 DIAGNOSIS — R10.819 ABDOMINAL TENDERNESS, REBOUND TENDERNESS PRESENCE NOT SPECIFIED, UNSPECIFIED LOCATION: ICD-10-CM

## 2023-10-05 DIAGNOSIS — K59.00 CONSTIPATION, UNSPECIFIED CONSTIPATION TYPE: ICD-10-CM

## 2023-10-05 DIAGNOSIS — K21.9 GASTROESOPHAGEAL REFLUX DISEASE, UNSPECIFIED WHETHER ESOPHAGITIS PRESENT: ICD-10-CM

## 2023-10-05 DIAGNOSIS — M81.0 OSTEOPOROSIS, UNSPECIFIED OSTEOPOROSIS TYPE, UNSPECIFIED PATHOLOGICAL FRACTURE PRESENCE: ICD-10-CM

## 2023-10-05 DIAGNOSIS — K59.00 CONSTIPATION, UNSPECIFIED CONSTIPATION TYPE: Primary | ICD-10-CM

## 2023-10-05 PROCEDURE — 74018 RADEX ABDOMEN 1 VIEW: CPT

## 2023-11-12 PROBLEM — I70.0 AORTIC ATHEROSCLEROSIS (HCC): Status: ACTIVE | Noted: 2023-11-12

## 2023-11-12 PROBLEM — F11.20 NARCOTIC DEPENDENCE (HCC): Status: ACTIVE | Noted: 2023-11-12

## 2023-11-29 ENCOUNTER — APPOINTMENT (OUTPATIENT)
Facility: HOSPITAL | Age: 83
DRG: 202 | End: 2023-11-29
Payer: MEDICARE

## 2023-11-29 ENCOUNTER — HOSPITAL ENCOUNTER (EMERGENCY)
Facility: HOSPITAL | Age: 83
Discharge: HOME | DRG: 202 | End: 2023-11-29
Attending: EMERGENCY MEDICINE
Payer: MEDICARE

## 2023-11-29 VITALS
OXYGEN SATURATION: 92 % | SYSTOLIC BLOOD PRESSURE: 122 MMHG | TEMPERATURE: 98.1 F | HEART RATE: 88 BPM | RESPIRATION RATE: 15 BRPM | DIASTOLIC BLOOD PRESSURE: 81 MMHG

## 2023-11-29 DIAGNOSIS — J06.9 ACUTE UPPER RESPIRATORY INFECTION: Primary | ICD-10-CM

## 2023-11-29 LAB
ANION GAP SERPL CALC-SCNC: 6 MMOL/L (ref 5–15)
BASOPHILS # BLD: 0.1 K/UL (ref 0–0.1)
BASOPHILS NFR BLD: 2 % (ref 0–1)
BUN SERPL-MCNC: 12 MG/DL (ref 6–20)
BUN/CREAT SERPL: 18 (ref 12–20)
CALCIUM SERPL-MCNC: 9.5 MG/DL (ref 8.5–10.1)
CHLORIDE SERPL-SCNC: 102 MMOL/L (ref 97–108)
CO2 SERPL-SCNC: 26 MMOL/L (ref 21–32)
COMMENT:: NORMAL
CREAT SERPL-MCNC: 0.67 MG/DL (ref 0.55–1.02)
DIFFERENTIAL METHOD BLD: ABNORMAL
EOSINOPHIL # BLD: 0.9 K/UL (ref 0–0.4)
EOSINOPHIL NFR BLD: 16 % (ref 0–7)
ERYTHROCYTE [DISTWIDTH] IN BLOOD BY AUTOMATED COUNT: 14.6 % (ref 11.5–14.5)
GLUCOSE SERPL-MCNC: 99 MG/DL (ref 65–100)
HCT VFR BLD AUTO: 33.9 % (ref 35–47)
HGB BLD-MCNC: 10.8 G/DL (ref 11.5–16)
IMM GRANULOCYTES # BLD AUTO: 0 K/UL (ref 0–0.04)
IMM GRANULOCYTES NFR BLD AUTO: 0 % (ref 0–0.5)
LYMPHOCYTES # BLD: 1 K/UL (ref 0.8–3.5)
LYMPHOCYTES NFR BLD: 18 % (ref 12–49)
MCH RBC QN AUTO: 25.8 PG (ref 26–34)
MCHC RBC AUTO-ENTMCNC: 31.9 G/DL (ref 30–36.5)
MCV RBC AUTO: 80.9 FL (ref 80–99)
MONOCYTES # BLD: 0.6 K/UL (ref 0–1)
MONOCYTES NFR BLD: 10 % (ref 5–13)
NEUTS SEG # BLD: 2.9 K/UL (ref 1.8–8)
NEUTS SEG NFR BLD: 54 % (ref 32–75)
NRBC # BLD: 0 K/UL (ref 0–0.01)
NRBC BLD-RTO: 0 PER 100 WBC
NT PRO BNP: 149 PG/ML
PLATELET # BLD AUTO: 270 K/UL (ref 150–400)
PMV BLD AUTO: 9.6 FL (ref 8.9–12.9)
POTASSIUM SERPL-SCNC: 4 MMOL/L (ref 3.5–5.1)
RBC # BLD AUTO: 4.19 M/UL (ref 3.8–5.2)
RBC MORPH BLD: ABNORMAL
SODIUM SERPL-SCNC: 134 MMOL/L (ref 136–145)
SPECIMEN HOLD: NORMAL
TROPONIN I SERPL HS-MCNC: 6 NG/L (ref 0–51)
WBC # BLD AUTO: 5.5 K/UL (ref 3.6–11)

## 2023-11-29 PROCEDURE — 80048 BASIC METABOLIC PNL TOTAL CA: CPT

## 2023-11-29 PROCEDURE — 83880 ASSAY OF NATRIURETIC PEPTIDE: CPT

## 2023-11-29 PROCEDURE — 94640 AIRWAY INHALATION TREATMENT: CPT

## 2023-11-29 PROCEDURE — 36415 COLL VENOUS BLD VENIPUNCTURE: CPT

## 2023-11-29 PROCEDURE — 71275 CT ANGIOGRAPHY CHEST: CPT

## 2023-11-29 PROCEDURE — 6360000004 HC RX CONTRAST MEDICATION: Performed by: RADIOLOGY

## 2023-11-29 PROCEDURE — 85025 COMPLETE CBC W/AUTO DIFF WBC: CPT

## 2023-11-29 PROCEDURE — 99285 EMERGENCY DEPT VISIT HI MDM: CPT

## 2023-11-29 PROCEDURE — 84484 ASSAY OF TROPONIN QUANT: CPT

## 2023-11-29 PROCEDURE — 6370000000 HC RX 637 (ALT 250 FOR IP): Performed by: EMERGENCY MEDICINE

## 2023-11-29 RX ORDER — IPRATROPIUM BROMIDE AND ALBUTEROL SULFATE 2.5; .5 MG/3ML; MG/3ML
2 SOLUTION RESPIRATORY (INHALATION)
Status: COMPLETED | OUTPATIENT
Start: 2023-11-29 | End: 2023-11-29

## 2023-11-29 RX ORDER — GUAIFENESIN 600 MG/1
1200 TABLET, EXTENDED RELEASE ORAL 2 TIMES DAILY
Qty: 20 TABLET | Refills: 0 | Status: SHIPPED | OUTPATIENT
Start: 2023-11-29 | End: 2023-12-09

## 2023-11-29 RX ORDER — MIDAZOLAM HYDROCHLORIDE 2 MG/2ML
1 INJECTION, SOLUTION INTRAMUSCULAR; INTRAVENOUS ONCE
Status: DISCONTINUED | OUTPATIENT
Start: 2023-11-29 | End: 2023-11-29

## 2023-11-29 RX ADMIN — IOPAMIDOL 70 ML: 755 INJECTION, SOLUTION INTRAVENOUS at 04:43

## 2023-11-29 RX ADMIN — IPRATROPIUM BROMIDE AND ALBUTEROL SULFATE 2 DOSE: 2.5; .5 SOLUTION RESPIRATORY (INHALATION) at 04:42

## 2023-11-29 ASSESSMENT — ENCOUNTER SYMPTOMS
NAUSEA: 0
VOMITING: 0
ABDOMINAL DISTENTION: 0
DIARRHEA: 0
BLOOD IN STOOL: 0
ABDOMINAL PAIN: 0
SHORTNESS OF BREATH: 1
COUGH: 1
ANAL BLEEDING: 0

## 2023-11-29 ASSESSMENT — LIFESTYLE VARIABLES
HOW MANY STANDARD DRINKS CONTAINING ALCOHOL DO YOU HAVE ON A TYPICAL DAY: PATIENT DOES NOT DRINK
HOW OFTEN DO YOU HAVE A DRINK CONTAINING ALCOHOL: NEVER

## 2023-11-29 ASSESSMENT — PAIN - FUNCTIONAL ASSESSMENT
PAIN_FUNCTIONAL_ASSESSMENT: NONE - DENIES PAIN
PAIN_FUNCTIONAL_ASSESSMENT: 0-10

## 2023-11-29 ASSESSMENT — PAIN SCALES - GENERAL: PAINLEVEL_OUTOF10: 0

## 2023-11-29 NOTE — ED TRIAGE NOTES
Pt arrives from home w/ CC SOB, coughing up beige mucus x 1 week. Pt states she cannot breath when lying flat or on her side, but better upright. Denies fever, chills, leg swelling. Endorses using albuterol x 6 days w/ no relief. A&Ox4.  VSS

## 2023-11-29 NOTE — ED PROVIDER NOTES
[facility] EM[facility]GENCY [facility]      Pt Name: Alin Medina  MRN: [id number]  [address] [facility] [place] 1940  Date of evaluation: 11/29/2023  Provider: Mauro Richter MD    CHIEF COMPLAINT       Chief Complaint   Patient presents with    Shortness of Breath    Cough         HISTORY OF PRESENT ILLNESS   (Location/Symptom, Timing/Onset, Context/Setting, Quality, Duration, Modifying Factors, Severity)  Note limiting factors. 83F w/ hx diverticulosis and GERD p/w 1wk cough. Pt reports 1wk productive cough and thinks had episode of hemoptysis. Also reports dyspnea w/ activity or exertion and extreme fatigue. No F/C, N/V/D or chest/abd pain. NO dizziness or syncope. No LE swelling. No hx of smoking or asthma/COPD. Reports that recently had an abnormal CXR showing \"opacity\" and was started on azithromycin by her PCP today w/ albuterol. No improvement. Denies any cardiac hx. No recent surgeries, hospitalizations, travel, hx of malignancy, exogenous estrogen use, hemoptysis, LE pain/swelling, or hx of PE/DVT. The history is provided by a relative and the patient. Review of External Medical Records:     Nursing Notes were reviewed. REVIEW OF SYSTEMS    (2-9 systems for level 4, 10 or more for level 5)     Review of Systems   Constitutional:  Positive for fatigue. Negative for diaphoresis and fever. HENT:  Negative for nosebleeds. Eyes:  Negative for visual disturbance. Respiratory:  Positive for cough and shortness of breath. Cardiovascular:  Negative for chest pain, palpitations and leg swelling. Gastrointestinal:  Negative for abdominal distention, abdominal pain, anal bleeding, blood in stool, diarrhea, nausea and vomiting. Endocrine: Negative for polyuria. Genitourinary:  Negative for difficulty urinating, dysuria, frequency and hematuria. Musculoskeletal:  Negative for joint swelling. Skin:  Negative for wound. Allergic/Immunologic: Negative for immunocompromised state. EKG, labs. Give duonebs. Monitor and reassess. 0615 Pt remains stable. Ambulating around ED w/o issue. EKG SR w/ RBBB, no ST changes but anterior TWI. Labs unremarkable including neg trp and BNP WNL. Unlikely Acs or acute CHF. CTA chest neg for PE or edema, suggestive of bronchitis but no evidence for lobar PNA. Pt already on azithromycin (has extensive list of allergies) and albuterol which I advised her to continue. Also recommended that see pulm. Suspect acute URI vs asthma/bronchitis. Low concern for sepsis at this time. Patient given specific return precautions and explained signs/symptoms for which to come back to ED immediately but otherwise advised to f/u w/ PCP over next 2-3days. Pt reports 1wk productive cough and thinks had episode of hemoptysis. Also reports dyspnea w/ activity or exertion and extreme fatigue. No F/C, N/V/D or chest/abd pain. NO dizziness or syncope. No LE swelling. No hx of smoking or asthma/COPD. Reports that recently had an abnormal CXR showing \"opacity\" and was started on azithromycin by her PCP today w/ albuterol. No improvement. Denies any cardiac hx. No recent surgeries, hospitalizations, travel, hx of malignancy, exogenous estrogen use, hemoptysis, LE pain/swelling, or hx of PE/DVT. Amount and/or Complexity of Data Reviewed  Labs: ordered. Decision-making details documented in ED Course. Radiology: ordered and independent interpretation performed. Decision-making details documented in ED Course. ECG/medicine tests: ordered and independent interpretation performed. Decision-making details documented in ED Course. Risk  OTC drugs. Prescription drug management. ED Course            CONSULTS:  None    PROCEDURES:  Unless otherwise noted below, none     Procedures      FINAL IMPRESSION      1.  Acute upper respiratory infection          DISPOSITION/PLAN   DISPOSITION Decision To Discharge 11/29/2023 05:26:40 AM      PATIENT REFERRED TO:  Faye Figueroa

## 2023-11-29 NOTE — ED NOTES
Pt c/o worsening SOB and cough x 1 week. Pt denies fevers or cp. Pt denies cardiac or pulmonary hx.       Dallin Dominguez [place]  11/29/23 [id number]

## 2023-11-30 ENCOUNTER — APPOINTMENT (OUTPATIENT)
Facility: HOSPITAL | Age: 83
DRG: 202 | End: 2023-11-30
Payer: MEDICARE

## 2023-11-30 ENCOUNTER — HOSPITAL ENCOUNTER (INPATIENT)
Facility: HOSPITAL | Age: 83
LOS: 1 days | Discharge: HOME | DRG: 202 | End: 2023-12-01
Attending: EMERGENCY MEDICINE | Admitting: INTERNAL MEDICINE
Payer: MEDICARE

## 2023-11-30 DIAGNOSIS — I70.0 AORTIC ATHEROSCLEROSIS (HCC): ICD-10-CM

## 2023-11-30 DIAGNOSIS — R06.89 DYSPNEA AND RESPIRATORY ABNORMALITIES: Primary | ICD-10-CM

## 2023-11-30 DIAGNOSIS — R09.02 HYPOXIA: ICD-10-CM

## 2023-11-30 DIAGNOSIS — R06.00 DYSPNEA AND RESPIRATORY ABNORMALITIES: Primary | ICD-10-CM

## 2023-11-30 DIAGNOSIS — R06.2 WHEEZING: ICD-10-CM

## 2023-11-30 DIAGNOSIS — E87.1 HYPONATREMIA: ICD-10-CM

## 2023-11-30 PROBLEM — J96.01 ACUTE RESPIRATORY FAILURE WITH HYPOXIA (HCC): Status: ACTIVE | Noted: 2023-11-30

## 2023-11-30 LAB
ALBUMIN SERPL-MCNC: 3.2 G/DL (ref 3.5–5)
ALBUMIN/GLOB SERPL: 0.9 (ref 1.1–2.2)
ALP SERPL-CCNC: 45 U/L (ref 45–117)
ALT SERPL-CCNC: 14 U/L (ref 12–78)
AMPHET UR QL SCN: NEGATIVE
ANION GAP SERPL CALC-SCNC: 3 MMOL/L (ref 5–15)
APPEARANCE UR: CLEAR
AST SERPL-CCNC: 28 U/L (ref 15–37)
B PERT DNA SPEC QL NAA+PROBE: NOT DETECTED
BACTERIA URNS QL MICRO: NEGATIVE /HPF
BARBITURATES UR QL SCN: NEGATIVE
BASOPHILS # BLD: 0 K/UL (ref 0–0.1)
BASOPHILS NFR BLD: 1 % (ref 0–1)
BENZODIAZ UR QL: NEGATIVE
BILIRUB SERPL-MCNC: 0.6 MG/DL (ref 0.2–1)
BILIRUB UR QL: NEGATIVE
BORDETELLA PARAPERTUSSIS BY PCR: NOT DETECTED
BUN SERPL-MCNC: 12 MG/DL (ref 6–20)
BUN/CREAT SERPL: 27 (ref 12–20)
C PNEUM DNA SPEC QL NAA+PROBE: NOT DETECTED
CALCIUM SERPL-MCNC: 7.2 MG/DL (ref 8.5–10.1)
CANNABINOIDS UR QL SCN: NEGATIVE
CHLORIDE SERPL-SCNC: 103 MMOL/L (ref 97–108)
CO2 SERPL-SCNC: 22 MMOL/L (ref 21–32)
COCAINE UR QL SCN: NEGATIVE
COLOR UR: ABNORMAL
COMMENT:: NORMAL
CREAT SERPL-MCNC: 0.45 MG/DL (ref 0.55–1.02)
DIFFERENTIAL METHOD BLD: ABNORMAL
EOSINOPHIL # BLD: 0.6 K/UL (ref 0–0.4)
EOSINOPHIL NFR BLD: 10 % (ref 0–7)
EPITH CASTS URNS QL MICRO: ABNORMAL /LPF
ERYTHROCYTE [DISTWIDTH] IN BLOOD BY AUTOMATED COUNT: 14.9 % (ref 11.5–14.5)
FLUAV AG NPH QL IA: NEGATIVE
FLUAV SUBTYP SPEC NAA+PROBE: NOT DETECTED
FLUBV AG NOSE QL IA: NEGATIVE
FLUBV RNA SPEC QL NAA+PROBE: NOT DETECTED
GLOBULIN SER CALC-MCNC: 3.5 G/DL (ref 2–4)
GLUCOSE SERPL-MCNC: 108 MG/DL (ref 65–100)
GLUCOSE UR STRIP.AUTO-MCNC: 250 MG/DL
HADV DNA SPEC QL NAA+PROBE: NOT DETECTED
HCOV 229E RNA SPEC QL NAA+PROBE: NOT DETECTED
HCOV HKU1 RNA SPEC QL NAA+PROBE: NOT DETECTED
HCOV NL63 RNA SPEC QL NAA+PROBE: NOT DETECTED
HCOV OC43 RNA SPEC QL NAA+PROBE: NOT DETECTED
HCT VFR BLD AUTO: 31.3 % (ref 35–47)
HGB BLD-MCNC: 10.2 G/DL (ref 11.5–16)
HGB UR QL STRIP: NEGATIVE
HMPV RNA SPEC QL NAA+PROBE: NOT DETECTED
HPIV1 RNA SPEC QL NAA+PROBE: NOT DETECTED
HPIV2 RNA SPEC QL NAA+PROBE: NOT DETECTED
HPIV3 RNA SPEC QL NAA+PROBE: NOT DETECTED
HPIV4 RNA SPEC QL NAA+PROBE: NOT DETECTED
HYALINE CASTS URNS QL MICRO: ABNORMAL /LPF (ref 0–5)
IMM GRANULOCYTES # BLD AUTO: 0 K/UL (ref 0–0.04)
IMM GRANULOCYTES NFR BLD AUTO: 0 % (ref 0–0.5)
KETONES UR QL STRIP.AUTO: 15 MG/DL
LEUKOCYTE ESTERASE UR QL STRIP.AUTO: NEGATIVE
LYMPHOCYTES # BLD: 1.2 K/UL (ref 0.8–3.5)
LYMPHOCYTES NFR BLD: 18 % (ref 12–49)
Lab: NORMAL
M PNEUMO DNA SPEC QL NAA+PROBE: NOT DETECTED
MAGNESIUM SERPL-MCNC: 1.8 MG/DL (ref 1.6–2.4)
MCH RBC QN AUTO: 26.6 PG (ref 26–34)
MCHC RBC AUTO-ENTMCNC: 32.6 G/DL (ref 30–36.5)
MCV RBC AUTO: 81.7 FL (ref 80–99)
METHADONE UR QL: NEGATIVE
MONOCYTES # BLD: 0.6 K/UL (ref 0–1)
MONOCYTES NFR BLD: 9 % (ref 5–13)
NEUTS SEG # BLD: 4 K/UL (ref 1.8–8)
NEUTS SEG NFR BLD: 62 % (ref 32–75)
NITRITE UR QL STRIP.AUTO: NEGATIVE
NRBC # BLD: 0 K/UL (ref 0–0.01)
NRBC BLD-RTO: 0 PER 100 WBC
OPIATES UR QL: NEGATIVE
PCP UR QL: NEGATIVE
PH UR STRIP: 6.5 (ref 5–8)
PHOSPHATE SERPL-MCNC: 2.2 MG/DL (ref 2.6–4.7)
PLATELET # BLD AUTO: 204 K/UL (ref 150–400)
PMV BLD AUTO: 9.6 FL (ref 8.9–12.9)
POTASSIUM SERPL-SCNC: 4.3 MMOL/L (ref 3.5–5.1)
PROT SERPL-MCNC: 6.7 G/DL (ref 6.4–8.2)
PROT UR STRIP-MCNC: NEGATIVE MG/DL
RBC # BLD AUTO: 3.83 M/UL (ref 3.8–5.2)
RBC #/AREA URNS HPF: ABNORMAL /HPF (ref 0–5)
RSV RNA SPEC QL NAA+PROBE: NOT DETECTED
RV+EV RNA SPEC QL NAA+PROBE: NOT DETECTED
SARS-COV-2 RDRP RESP QL NAA+PROBE: NOT DETECTED
SARS-COV-2 RNA RESP QL NAA+PROBE: NOT DETECTED
SODIUM SERPL-SCNC: 128 MMOL/L (ref 136–145)
SOURCE: NORMAL
SP GR UR REFRACTOMETRY: 1.02 (ref 1–1.03)
SPECIMEN HOLD: NORMAL
TROPONIN I SERPL HS-MCNC: 6 NG/L (ref 0–51)
TROPONIN I SERPL HS-MCNC: 6 NG/L (ref 0–51)
TSH SERPL DL<=0.05 MIU/L-ACNC: 0.86 UIU/ML (ref 0.36–3.74)
URINE CULTURE IF INDICATED: ABNORMAL
UROBILINOGEN UR QL STRIP.AUTO: 0.2 EU/DL (ref 0.2–1)
WBC # BLD AUTO: 6.4 K/UL (ref 3.6–11)
WBC URNS QL MICRO: ABNORMAL /HPF (ref 0–4)

## 2023-11-30 PROCEDURE — 96361 HYDRATE IV INFUSION ADD-ON: CPT

## 2023-11-30 PROCEDURE — 84443 ASSAY THYROID STIM HORMONE: CPT

## 2023-11-30 PROCEDURE — 94640 AIRWAY INHALATION TREATMENT: CPT

## 2023-11-30 PROCEDURE — 6360000002 HC RX W HCPCS: Performed by: INTERNAL MEDICINE

## 2023-11-30 PROCEDURE — 83735 ASSAY OF MAGNESIUM: CPT

## 2023-11-30 PROCEDURE — 96374 THER/PROPH/DIAG INJ IV PUSH: CPT

## 2023-11-30 PROCEDURE — 2060000000 HC ICU INTERMEDIATE R&B

## 2023-11-30 PROCEDURE — 6370000000 HC RX 637 (ALT 250 FOR IP): Performed by: INTERNAL MEDICINE

## 2023-11-30 PROCEDURE — 0202U NFCT DS 22 TRGT SARS-COV-2: CPT

## 2023-11-30 PROCEDURE — 71046 X-RAY EXAM CHEST 2 VIEWS: CPT

## 2023-11-30 PROCEDURE — 81001 URINALYSIS AUTO W/SCOPE: CPT

## 2023-11-30 PROCEDURE — 84484 ASSAY OF TROPONIN QUANT: CPT

## 2023-11-30 PROCEDURE — 87635 SARS-COV-2 COVID-19 AMP PRB: CPT

## 2023-11-30 PROCEDURE — 6360000002 HC RX W HCPCS: Performed by: EMERGENCY MEDICINE

## 2023-11-30 PROCEDURE — 36415 COLL VENOUS BLD VENIPUNCTURE: CPT

## 2023-11-30 PROCEDURE — 6370000000 HC RX 637 (ALT 250 FOR IP): Performed by: HOSPITALIST

## 2023-11-30 PROCEDURE — 99285 EMERGENCY DEPT VISIT HI MDM: CPT

## 2023-11-30 PROCEDURE — 2700000000 HC OXYGEN THERAPY PER DAY

## 2023-11-30 PROCEDURE — 2580000003 HC RX 258: Performed by: EMERGENCY MEDICINE

## 2023-11-30 PROCEDURE — 2580000003 HC RX 258: Performed by: INTERNAL MEDICINE

## 2023-11-30 PROCEDURE — 84100 ASSAY OF PHOSPHORUS: CPT

## 2023-11-30 PROCEDURE — 80307 DRUG TEST PRSMV CHEM ANLYZR: CPT

## 2023-11-30 PROCEDURE — 6360000002 HC RX W HCPCS: Performed by: HOSPITALIST

## 2023-11-30 PROCEDURE — 87804 INFLUENZA ASSAY W/OPTIC: CPT

## 2023-11-30 PROCEDURE — 85025 COMPLETE CBC W/AUTO DIFF WBC: CPT

## 2023-11-30 PROCEDURE — 6370000000 HC RX 637 (ALT 250 FOR IP): Performed by: EMERGENCY MEDICINE

## 2023-11-30 PROCEDURE — 80053 COMPREHEN METABOLIC PANEL: CPT

## 2023-11-30 RX ORDER — ENOXAPARIN SODIUM 100 MG/ML
40 INJECTION SUBCUTANEOUS DAILY
Status: DISCONTINUED | OUTPATIENT
Start: 2023-11-30 | End: 2023-12-01 | Stop reason: HOSPADM

## 2023-11-30 RX ORDER — GUAIFENESIN 600 MG/1
1200 TABLET, EXTENDED RELEASE ORAL 2 TIMES DAILY
Status: DISCONTINUED | OUTPATIENT
Start: 2023-11-30 | End: 2023-12-01 | Stop reason: HOSPADM

## 2023-11-30 RX ORDER — IPRATROPIUM BROMIDE AND ALBUTEROL SULFATE 2.5; .5 MG/3ML; MG/3ML
1 SOLUTION RESPIRATORY (INHALATION)
Status: DISCONTINUED | OUTPATIENT
Start: 2023-12-01 | End: 2023-12-01 | Stop reason: HOSPADM

## 2023-11-30 RX ORDER — POTASSIUM CHLORIDE 750 MG/1
40 TABLET, FILM COATED, EXTENDED RELEASE ORAL PRN
Status: DISCONTINUED | OUTPATIENT
Start: 2023-11-30 | End: 2023-12-01 | Stop reason: HOSPADM

## 2023-11-30 RX ORDER — BUDESONIDE 0.5 MG/2ML
0.5 INHALANT ORAL
Status: DISCONTINUED | OUTPATIENT
Start: 2023-11-30 | End: 2023-12-01 | Stop reason: HOSPADM

## 2023-11-30 RX ORDER — ALBUTEROL SULFATE 2.5 MG/3ML
2.5 SOLUTION RESPIRATORY (INHALATION) EVERY 4 HOURS PRN
Status: DISCONTINUED | OUTPATIENT
Start: 2023-11-30 | End: 2023-12-01 | Stop reason: HOSPADM

## 2023-11-30 RX ORDER — ACETAMINOPHEN 325 MG/1
650 TABLET ORAL EVERY 6 HOURS PRN
Status: DISCONTINUED | OUTPATIENT
Start: 2023-11-30 | End: 2023-12-01 | Stop reason: HOSPADM

## 2023-11-30 RX ORDER — POTASSIUM CHLORIDE 7.45 MG/ML
10 INJECTION INTRAVENOUS PRN
Status: DISCONTINUED | OUTPATIENT
Start: 2023-11-30 | End: 2023-12-01 | Stop reason: HOSPADM

## 2023-11-30 RX ORDER — SODIUM CHLORIDE 9 MG/ML
INJECTION, SOLUTION INTRAVENOUS CONTINUOUS
Status: DISCONTINUED | OUTPATIENT
Start: 2023-11-30 | End: 2023-11-30

## 2023-11-30 RX ORDER — POLYETHYLENE GLYCOL 3350 17 G/17G
17 POWDER, FOR SOLUTION ORAL DAILY PRN
Status: DISCONTINUED | OUTPATIENT
Start: 2023-11-30 | End: 2023-12-01 | Stop reason: HOSPADM

## 2023-11-30 RX ORDER — IPRATROPIUM BROMIDE AND ALBUTEROL SULFATE 2.5; .5 MG/3ML; MG/3ML
1 SOLUTION RESPIRATORY (INHALATION) 4 TIMES DAILY
Status: DISCONTINUED | OUTPATIENT
Start: 2023-11-30 | End: 2023-11-30

## 2023-11-30 RX ORDER — TRAMADOL HYDROCHLORIDE 50 MG/1
100 TABLET ORAL 2 TIMES DAILY PRN
Status: DISCONTINUED | OUTPATIENT
Start: 2023-11-30 | End: 2023-12-01 | Stop reason: HOSPADM

## 2023-11-30 RX ORDER — 0.9 % SODIUM CHLORIDE 0.9 %
500 INTRAVENOUS SOLUTION INTRAVENOUS ONCE
Status: COMPLETED | OUTPATIENT
Start: 2023-11-30 | End: 2023-11-30

## 2023-11-30 RX ORDER — ONDANSETRON 4 MG/1
4 TABLET, ORALLY DISINTEGRATING ORAL EVERY 8 HOURS PRN
Status: DISCONTINUED | OUTPATIENT
Start: 2023-11-30 | End: 2023-12-01 | Stop reason: HOSPADM

## 2023-11-30 RX ORDER — FUROSEMIDE 10 MG/ML
40 INJECTION INTRAMUSCULAR; INTRAVENOUS ONCE
Status: DISCONTINUED | OUTPATIENT
Start: 2023-11-30 | End: 2023-11-30

## 2023-11-30 RX ORDER — MAGNESIUM SULFATE IN WATER 40 MG/ML
2000 INJECTION, SOLUTION INTRAVENOUS PRN
Status: DISCONTINUED | OUTPATIENT
Start: 2023-11-30 | End: 2023-12-01 | Stop reason: HOSPADM

## 2023-11-30 RX ORDER — FUROSEMIDE 10 MG/ML
20 INJECTION INTRAMUSCULAR; INTRAVENOUS ONCE
Status: COMPLETED | OUTPATIENT
Start: 2023-11-30 | End: 2023-11-30

## 2023-11-30 RX ORDER — SODIUM CHLORIDE 9 MG/ML
INJECTION, SOLUTION INTRAVENOUS PRN
Status: DISCONTINUED | OUTPATIENT
Start: 2023-11-30 | End: 2023-12-01 | Stop reason: HOSPADM

## 2023-11-30 RX ORDER — SODIUM CHLORIDE 0.9 % (FLUSH) 0.9 %
5-40 SYRINGE (ML) INJECTION PRN
Status: DISCONTINUED | OUTPATIENT
Start: 2023-11-30 | End: 2023-12-01 | Stop reason: HOSPADM

## 2023-11-30 RX ORDER — ACETAMINOPHEN 650 MG/1
650 SUPPOSITORY RECTAL EVERY 6 HOURS PRN
Status: DISCONTINUED | OUTPATIENT
Start: 2023-11-30 | End: 2023-12-01 | Stop reason: HOSPADM

## 2023-11-30 RX ORDER — LORAZEPAM 1 MG/1
1 TABLET ORAL EVERY 4 HOURS PRN
Status: DISCONTINUED | OUTPATIENT
Start: 2023-11-30 | End: 2023-11-30

## 2023-11-30 RX ORDER — SODIUM CHLORIDE 0.9 % (FLUSH) 0.9 %
5-40 SYRINGE (ML) INJECTION EVERY 12 HOURS SCHEDULED
Status: DISCONTINUED | OUTPATIENT
Start: 2023-11-30 | End: 2023-12-01 | Stop reason: HOSPADM

## 2023-11-30 RX ORDER — ONDANSETRON 2 MG/ML
4 INJECTION INTRAMUSCULAR; INTRAVENOUS EVERY 6 HOURS PRN
Status: DISCONTINUED | OUTPATIENT
Start: 2023-11-30 | End: 2023-12-01 | Stop reason: HOSPADM

## 2023-11-30 RX ADMIN — GUAIFENESIN 1200 MG: 600 TABLET, EXTENDED RELEASE ORAL at 23:15

## 2023-11-30 RX ADMIN — SODIUM CHLORIDE, PRESERVATIVE FREE 10 ML: 5 INJECTION INTRAVENOUS at 23:15

## 2023-11-30 RX ADMIN — METHYLPREDNISOLONE SODIUM SUCCINATE 40 MG: 40 INJECTION, POWDER, FOR SOLUTION INTRAMUSCULAR; INTRAVENOUS at 19:11

## 2023-11-30 RX ADMIN — METHYLPREDNISOLONE SODIUM SUCCINATE 40 MG: 40 INJECTION, POWDER, FOR SOLUTION INTRAMUSCULAR; INTRAVENOUS at 13:44

## 2023-11-30 RX ADMIN — SODIUM CHLORIDE: 9 INJECTION, SOLUTION INTRAVENOUS at 07:36

## 2023-11-30 RX ADMIN — ALBUTEROL SULFATE 2.5 MG: 2.5 SOLUTION RESPIRATORY (INHALATION) at 08:09

## 2023-11-30 RX ADMIN — AZITHROMYCIN MONOHYDRATE 500 MG: 500 INJECTION, POWDER, LYOPHILIZED, FOR SOLUTION INTRAVENOUS at 07:37

## 2023-11-30 RX ADMIN — ONDANSETRON 4 MG: 2 INJECTION INTRAMUSCULAR; INTRAVENOUS at 08:38

## 2023-11-30 RX ADMIN — GUAIFENESIN 1200 MG: 600 TABLET, EXTENDED RELEASE ORAL at 08:38

## 2023-11-30 RX ADMIN — ENOXAPARIN SODIUM 40 MG: 100 INJECTION SUBCUTANEOUS at 08:38

## 2023-11-30 RX ADMIN — SODIUM CHLORIDE 500 ML: 9 INJECTION, SOLUTION INTRAVENOUS at 02:41

## 2023-11-30 RX ADMIN — IPRATROPIUM BROMIDE AND ALBUTEROL SULFATE 1 DOSE: 2.5; .5 SOLUTION RESPIRATORY (INHALATION) at 19:11

## 2023-11-30 RX ADMIN — FUROSEMIDE 20 MG: 10 INJECTION, SOLUTION INTRAMUSCULAR; INTRAVENOUS at 19:11

## 2023-11-30 RX ADMIN — METHYLPREDNISOLONE SODIUM SUCCINATE 125 MG: 125 INJECTION, POWDER, FOR SOLUTION INTRAMUSCULAR; INTRAVENOUS at 03:02

## 2023-11-30 RX ADMIN — BUDESONIDE 500 MCG: 0.5 SUSPENSION RESPIRATORY (INHALATION) at 19:12

## 2023-11-30 RX ADMIN — ALBUTEROL SULFATE 2.5 MG: 2.5 SOLUTION RESPIRATORY (INHALATION) at 13:44

## 2023-11-30 RX ADMIN — IPRATROPIUM BROMIDE AND ALBUTEROL SULFATE 1 DOSE: 2.5; .5 SOLUTION RESPIRATORY (INHALATION) at 02:58

## 2023-11-30 ASSESSMENT — PAIN - FUNCTIONAL ASSESSMENT: PAIN_FUNCTIONAL_ASSESSMENT: NONE - DENIES PAIN

## 2023-11-30 NOTE — ED NOTES
Patient and patients family member informed about 1000mL fluid restriction. RN gave pt 500mL hospital cup to measure intake. Pt and family member informed that the patient was able to have about two of those cups throughout the entire day. Patient and family understood teaching and were given the opportunity for any questions. No questions at this time.       Jia Lowry RN  11/30/23 8448

## 2023-11-30 NOTE — ED NOTES
Pt states increased work of breathing. On assessment pt has new onset of labored respirations and states she feels like she can't catch her breath. Angela Hughes MD at bedside. Respiratory paged. Patients O2 sat is 93.       Jacqueline Norwood RN  11/30/23 4619

## 2023-11-30 NOTE — PROGRESS NOTES
Hospitalist Progress Note  Nick Baca MD  Answering service: [phone]        Pt seen and examined in ED  Admitted earlier this am by the team.  Admitted earlier for ROSARIO. Patient has had URI like symptoms for > 1 week. Pt was wheezing on admission. Given ROSARIO, she was placed on 5L O2 for comfort. Exam: significant for inspiratory wheezing on exam, otherwise normal    CTA negative for PE, Mass, effusion. Did notice bilateral bronchial thickening     Suspect Tracheobronchitis  - on IV steroids, add NEBS QID, Pulmicort BID  - Stop IVF, Lasix 20mg X 1  - on discharge give Prednisone taper or 40mg daily X 7 days than azithromycin to complete course  Wean O2  - discharge home in AM if better and on RA.                   Nick Baca MD

## 2023-11-30 NOTE — ED NOTES
Bedside shift change report given to [facility] (oncoming nurse) by Mickey Mcmanus RN (offgoing nurse). Report included the following information Nurse Handoff Report, ED Encounter Summary, ED SBAR, Adult Overview, Intake/Output, MAR, and Recent Results.         Radha Carrera RN  11/30/23 [id number]

## 2023-11-30 NOTE — ED PROVIDER NOTES
[facility] EM[facility]GENCY [facility]      Pt Name: Rosalina Elaine  MRN: [id number]  [address] [facility] [place] 1940  Date of evaluation: 11/30/2023  Provider: Shahbaz Rojas MD    [address]       Chief Complaint   Patient presents with    Shortness of Breath         HISTORY OF PRESENT ILLNESS   (Location/Symptom, Timing/Onset, Context/Setting, Quality, Duration, Modifying Factors, Severity)  Note limiting factors. Patient presents with her daughter for respiratory distress. Patient is an 80-year-old female with history of diverticulitis, corneal dystrophy, environmental allergies, GERD, hypertension, hyponatremia, osteoporosis, shingles, vertebral compression fracture, vocal cord anomaly who presents with 1 week history of wheezing, cough. Notes that she was seen in the ED last night, but her symptoms worsened so she came back today. Patient with audible wheezing on exam with increased work of breathing noted. Patient says that she has completed a Z-Narayan per her PCP, and has been using albuterol with minimal relief. No fevers, chills, nausea, vomiting. The history is provided by the patient and a relative. Nursing Notes were reviewed.     REVIEW OF SYSTEMS    Not Required     Review of Systems    PAST MEDICAL HISTORY     Past Medical History:   Diagnosis Date    Diverticulitis     Dystrophy, cornea     Environmental allergies     Family history of skin cancer     GERD (gastroesophageal reflux disease)     Hypotension     Ill-defined condition     Hyponatremia    Menopause     LMP-unknown    Multiple drug allergies     Osteoporosis     Dr. Nia Espinoza at [facility]    Shingles 05/2019    Sun-damaged skin     Vertebral compression fracture ([address]) 01/05/12    Vocal cord anomaly     vocal cord dysfunction per speech therapist       SURGICAL HISTORY       Past Surgical History:   Procedure Laterality Date    BLD CARPEL TUNNEL RELEASE      CATARACT REMOVAL  07/2012    COLONOSCOPY N/A 7/7/2017 available at the time of this note:    XR CHEST (2 VW)   Final Result      No acute process. No significant change since yesterday. LABS:  Labs Reviewed   CBC WITH AUTO DIFFERENTIAL - Abnormal; Notable for the following components:       Result Value    Hemoglobin 10.2 (*)     Hematocrit 31.3 (*)     RDW 14.9 (*)     Eosinophils % 10 (*)     Eosinophils Absolute 0.6 (*)     All other components within normal limits   RAPID INFLUENZA A/B ANTIGENS   COVID-19, RAPID   EXTRA TUBES HOLD   COMPREHENSIVE METABOLIC PANEL       All other labs were within normal range or not returned as of this dictation. EMERGENCY DEPARTMENT COURSE and DIFFERENTIAL DIAGNOSIS/MDM:   Medical Decision Making  Differential diagnosis includes PE versus pulmonary edema versus pneumonia versus bronchitis versus viral URI    Patient with extensive workup yesterday that was negative for PE, pneumonia. BNP showed no evidence of volume overload. Patient reports worsening symptoms, audible wheezing noted on exam and patient speaking in short sentences. Improved symptoms after DuoNeb therapy in ED, Solu-Medrol and patient placed on mid flow nasal cannula for increased work of breathing. She remains hemodynamically stable, no concern for intubation in ED. Will be admitted for further management. Amount and/or Complexity of Data Reviewed  Labs: ordered. Decision-making details documented in ED Course. Radiology: ordered and independent interpretation performed. Decision-making details documented in ED Course. Risk  Prescription drug management. Decision regarding hospitalization. EKG: All EKG's are interpreted by the Emergency Department Physician who either signs or Co-signs this chart in the absence of a cardiologist.    ED Course as of 11/30/23 0422   Thu Nov 30, 2023   0407 Patient switched to mid flow 3L NC that she is tolerating well. No concern for unstable airway at this time.   Will be admitted for further

## 2023-11-30 NOTE — H&P
[facility]  HISTORY AND PHYSICAL    Name:  Maranda Weston  MR#:  [id number]  :  1940  ACCOUNT #:  [de-identified]  ADMIT DATE:  2023      The patient was seen, evaluated, and admitted by me on 2023. PRIMARY CARE PHYSICIAN:  Sabino Hester MD    SOURCE OF INFORMATION:  The patient and the patient's daughter who was present at the bedside and review of ED electronic medical records. CHIEF COMPLAINT:  Shortness of breath and cough. HISTORY OF PRESENT ILLNESS: This is an 80-year-old woman with a past medical history significant for diverticulosis and gastroesophageal reflux disease, presented at the emergency room with shortness of breath and cough. The patient's symptoms started about a week ago. The cough is productive of whitish sputum. The patient also stated that she had one episode of hemoptysis. The shortness of breath is worse with mild exertion such as walking. The patient denies history of COPD/asthma. No history of congestive heart failure. No sick contacts. The patient was placed on Zithromax by primary care physician for suspected bronchitis. Despite being on antibiotics, the patient's symptoms did not improve and the patient came to the emergency room for further evaluation. When the patient arrived at the emergency room, CTA of the chest was obtained. This was negative for acute pathology. The patient was discharged home. When the patient arrived at home, her symptoms got worse and came back to the emergency room. When the patient arrived at the emergency room, the patient was in significant respiratory distress and required supplemental oxygen. The patient was not on oxygen prior to this. The patient was then referred to the hospitalist service for admission. There was no history of fever, rigors, or chills. PAST MEDICAL HISTORY:  1. Diverticulosis. 2.  Gastroesophageal reflux disease.     ALLERGIES:  THE PATIENT IS ALLERGIC TO Normal bowel sounds. CNS:  Alert and oriented x3. No gross focal neurological deficit. EXTREMITIES:  No edema. Pulses are 2+ bilaterally. MUSCULOSKELETAL SYSTEM:  No obvious joint deformity or swelling. SKIN:  No active skin lesions seen in the exposed part of the body. PSYCHIATRY:  Normal mood and affect. LYMPHATIC SYSTEM:  No cervical lymphadenopathy. DIAGNOSTIC DATA:  CTA of the chest shows no pulmonary embolism, bilateral nonspecific bronchitis, no lobar pneumonia. LABORATORY DATA:  Chemistry:  Sodium 128, potassium 4.3, chloride 103, CO2 of 22, glucose 108, BUN 12, creatinine 0.45, calcium 7.2, total bilirubin 0.6, ALT 14, AST 28, alkaline phosphatase 45, total protein 6.7, albumin level 3.2, and globulin at 3.5. Hematology:  WBC 6.4, hemoglobin 10.2, hematocrit 31.3, and platelets 902. Cardiac Profile:  Troponin high-sensitivity 6. Pro-BNP level 149. ASSESSMENT:  1. Acute respiratory failure with hypoxia. 2.  Hyponatremia. 3.  Anemia. 4.  Acute bronchitis. 5.  Hypocalcemia. PLAN:  1. Acute respiratory failure with hypoxia: We will admit the patient for further evaluation and treatment. We will identify and treat underlying etiological factors. The patient required supplemental oxygen because of the increased work of breathing. CTA of the chest is negative for PE and pneumonia. BNP level is within normal limits. We will check serial cardiac markers to rule out acute myocardial infarction. We will continue with supplemental oxygen. 2.  Hyponatremia: This may be due to volume depletion. We will carry out fluid therapy. CT of the chest did not show any mass lesion. The patient may require the Nephrology consult if there is no improvement with fluid therapy. We will also place the patient on fluid restriction. 3.  Anemia: This is most likely due to chronic disease. We will carry out anemia workup including checking a stool guaiac to rule out occult GI bleed.   4.  Acute

## 2023-11-30 NOTE — ED TRIAGE NOTES
Patient arrives with a CC of SOB that started last week. Patient stated she was seen when the symptoms started and her scans came back \"clear. \" Stated she was given prescriptions but they are not helping. Daughter stated she was a \"gurgly rattling sound around her throat. \"     Stated lying down worsens.      Stated she took 3 rounds of albuterol before coming in at 11p, 12a and [facility]

## 2023-12-01 ENCOUNTER — APPOINTMENT (OUTPATIENT)
Facility: HOSPITAL | Age: 83
DRG: 202 | End: 2023-12-01
Attending: INTERNAL MEDICINE
Payer: MEDICARE

## 2023-12-01 VITALS
HEART RATE: 87 BPM | SYSTOLIC BLOOD PRESSURE: 103 MMHG | BODY MASS INDEX: 24.3 KG/M2 | WEIGHT: 132.06 LBS | TEMPERATURE: 98.3 F | HEIGHT: 62 IN | RESPIRATION RATE: 15 BRPM | DIASTOLIC BLOOD PRESSURE: 60 MMHG | OXYGEN SATURATION: 98 %

## 2023-12-01 PROBLEM — J96.01 ACUTE RESPIRATORY FAILURE WITH HYPOXIA (HCC): Status: RESOLVED | Noted: 2023-11-30 | Resolved: 2023-12-01

## 2023-12-01 LAB
ALBUMIN SERPL-MCNC: 3.6 G/DL (ref 3.5–5)
ALBUMIN/GLOB SERPL: 1.1 (ref 1.1–2.2)
ALP SERPL-CCNC: 52 U/L (ref 45–117)
ALT SERPL-CCNC: 14 U/L (ref 12–78)
ANION GAP SERPL CALC-SCNC: 6 MMOL/L (ref 5–15)
AST SERPL-CCNC: 14 U/L (ref 15–37)
BASOPHILS # BLD: 0 K/UL (ref 0–0.1)
BASOPHILS NFR BLD: 0 % (ref 0–1)
BILIRUB SERPL-MCNC: 0.4 MG/DL (ref 0.2–1)
BUN SERPL-MCNC: 12 MG/DL (ref 6–20)
BUN/CREAT SERPL: 22 (ref 12–20)
CALCIUM SERPL-MCNC: 8.2 MG/DL (ref 8.5–10.1)
CHLORIDE SERPL-SCNC: 103 MMOL/L (ref 97–108)
CO2 SERPL-SCNC: 24 MMOL/L (ref 21–32)
CREAT SERPL-MCNC: 0.54 MG/DL (ref 0.55–1.02)
DIFFERENTIAL METHOD BLD: ABNORMAL
EOSINOPHIL # BLD: 0 K/UL (ref 0–0.4)
EOSINOPHIL NFR BLD: 0 % (ref 0–7)
ERYTHROCYTE [DISTWIDTH] IN BLOOD BY AUTOMATED COUNT: 14.8 % (ref 11.5–14.5)
GLOBULIN SER CALC-MCNC: 3.2 G/DL (ref 2–4)
GLUCOSE SERPL-MCNC: 131 MG/DL (ref 65–100)
HCT VFR BLD AUTO: 30.9 % (ref 35–47)
HGB BLD-MCNC: 9.9 G/DL (ref 11.5–16)
IMM GRANULOCYTES # BLD AUTO: 0 K/UL (ref 0–0.04)
IMM GRANULOCYTES NFR BLD AUTO: 0 % (ref 0–0.5)
LYMPHOCYTES # BLD: 0.7 K/UL (ref 0.8–3.5)
LYMPHOCYTES NFR BLD: 11 % (ref 12–49)
MCH RBC QN AUTO: 26.3 PG (ref 26–34)
MCHC RBC AUTO-ENTMCNC: 32 G/DL (ref 30–36.5)
MCV RBC AUTO: 82 FL (ref 80–99)
MONOCYTES # BLD: 0.5 K/UL (ref 0–1)
MONOCYTES NFR BLD: 7 % (ref 5–13)
NEUTS SEG # BLD: 5.3 K/UL (ref 1.8–8)
NEUTS SEG NFR BLD: 82 % (ref 32–75)
NRBC # BLD: 0 K/UL (ref 0–0.01)
NRBC BLD-RTO: 0 PER 100 WBC
PLATELET # BLD AUTO: 242 K/UL (ref 150–400)
PMV BLD AUTO: 9.9 FL (ref 8.9–12.9)
POTASSIUM SERPL-SCNC: 3.9 MMOL/L (ref 3.5–5.1)
PROT SERPL-MCNC: 6.8 G/DL (ref 6.4–8.2)
RBC # BLD AUTO: 3.77 M/UL (ref 3.8–5.2)
RBC MORPH BLD: ABNORMAL
SODIUM SERPL-SCNC: 133 MMOL/L (ref 136–145)
WBC # BLD AUTO: 6.5 K/UL (ref 3.6–11)

## 2023-12-01 PROCEDURE — 93306 TTE W/DOPPLER COMPLETE: CPT

## 2023-12-01 PROCEDURE — 6360000002 HC RX W HCPCS: Performed by: HOSPITALIST

## 2023-12-01 PROCEDURE — 94640 AIRWAY INHALATION TREATMENT: CPT

## 2023-12-01 PROCEDURE — 36415 COLL VENOUS BLD VENIPUNCTURE: CPT

## 2023-12-01 PROCEDURE — 6370000000 HC RX 637 (ALT 250 FOR IP): Performed by: HOSPITALIST

## 2023-12-01 PROCEDURE — 85025 COMPLETE CBC W/AUTO DIFF WBC: CPT

## 2023-12-01 PROCEDURE — 94760 N-INVAS EAR/PLS OXIMETRY 1: CPT

## 2023-12-01 PROCEDURE — 80053 COMPREHEN METABOLIC PANEL: CPT

## 2023-12-01 PROCEDURE — 6360000002 HC RX W HCPCS: Performed by: INTERNAL MEDICINE

## 2023-12-01 PROCEDURE — 2700000000 HC OXYGEN THERAPY PER DAY

## 2023-12-01 PROCEDURE — 2580000003 HC RX 258: Performed by: INTERNAL MEDICINE

## 2023-12-01 RX ORDER — PREDNISONE 20 MG/1
40 TABLET ORAL DAILY
Qty: 10 TABLET | Refills: 0 | Status: SHIPPED | OUTPATIENT
Start: 2023-12-01 | End: 2023-12-06

## 2023-12-01 RX ORDER — DIPHENHYDRAMINE HYDROCHLORIDE 50 MG/ML
12.5 INJECTION INTRAMUSCULAR; INTRAVENOUS EVERY 6 HOURS PRN
Status: DISCONTINUED | OUTPATIENT
Start: 2023-12-01 | End: 2023-12-01 | Stop reason: HOSPADM

## 2023-12-01 RX ADMIN — IPRATROPIUM BROMIDE AND ALBUTEROL SULFATE 1 DOSE: 2.5; .5 SOLUTION RESPIRATORY (INHALATION) at 09:24

## 2023-12-01 RX ADMIN — AZITHROMYCIN MONOHYDRATE 500 MG: 500 INJECTION, POWDER, LYOPHILIZED, FOR SOLUTION INTRAVENOUS at 07:19

## 2023-12-01 RX ADMIN — DIPHENHYDRAMINE HYDROCHLORIDE 12.5 MG: 50 INJECTION INTRAMUSCULAR; INTRAVENOUS at 08:46

## 2023-12-01 RX ADMIN — BUDESONIDE 500 MCG: 0.5 SUSPENSION RESPIRATORY (INHALATION) at 09:24

## 2023-12-01 RX ADMIN — METHYLPREDNISOLONE SODIUM SUCCINATE 40 MG: 40 INJECTION, POWDER, FOR SOLUTION INTRAMUSCULAR; INTRAVENOUS at 03:38

## 2023-12-01 NOTE — ED NOTES
TRANSFER - OUT REPORT:    Verbal report given on Carine Forrester  being transferred to [facility] for routine progression of patient care       Report consisted of patient's Situation, Background, Assessment and   Recommendations(SBAR). Information from the following report(s) Nurse Handoff Report, ED Encounter Summary, ED SBAR, [facility], and Recent Results was reviewed with the receiving nurse. Alborn Fall Assessment:    Presents to emergency department  because of falls (Syncope, seizure, or loss of consciousness): No  Age > 70: Yes  Altered Mental Status, Intoxication with alcohol or substance confusion (Disorientation, impaired judgment, poor safety awaremess, or inability to follow instructions): No  Impaired Mobility: Ambulates or transfers with assistive devices or assistance; Unable to ambulate or transer.: No  Nursing Judgement: No          Lines:   Peripheral IV 11/30/23 Right Forearm (Active)        Opportunity for questions and clarification was provided.       Patient transported with:  O2 @ 2lpm          Trung Brian RN  11/30/23 2464

## 2023-12-01 NOTE — PLAN OF CARE
Problem: Safety - Adult  Goal: Free from fall injury  12/1/2023 0624 by Rose Sargent LPN  Outcome: Progressing  12/1/2023 0624 by Rose Sargent, JAMELN  Outcome: Progressing  Flowsheets (Taken 12/1/2023 0154)  Free From Fall Injury: Instruct family/caregiver on patient safety     Problem: Discharge Planning  Goal: Discharge to home or other facility with appropriate resources  12/1/2023 0624 by Rose Sargent LPN  Outcome: Progressing  12/1/2023 0624 by Rose Sargent LPN  Outcome: Progressing  Flowsheets (Taken 12/1/2023 0154)  Discharge to home or other facility with appropriate resources: Identify barriers to discharge with patient and caregiver

## 2023-12-01 NOTE — DISCHARGE INSTRUCTIONS
Discharge Instructions       PATIENT ID: April Buckner  MRN: [id number]   YOB: 1940    DATE OF ADMISSION: [unfilled]    DATE OF DISCHARGE: 12/1/2023    PRIMARY CARE PROVIDER: @PCP@     ATTENDING PHYSICIAN: [unfilled]  DISCHARGING PROVIDER: Dirk Negrete MD    To contact this individual call [phone] and ask the  to page. If unavailable ask to be transferred the Adult Hospitalist Department. DISCHARGE DIAGNOSES Bronchitis    CONSULTATIONS: None    PROCEDURES/SURGERIES: * No surgery found *    PENDING TEST RESULTS:   At the time of discharge the following test results are still pending: none    FOLLOW UP APPOINTMENTS:   PCP    ADDITIONAL CARE RECOMMENDATIONS: none    DIET: regular diet    ACTIVITY: activity as tolerated      DISCHARGE MEDICATIONS:   See Medication Reconciliation Form    It is important that you take the medication exactly as they are prescribed. Keep your medication in the bottles provided by the pharmacist and keep a list of the medication names, dosages, and times to be taken in your wallet. Do not take other medications without consulting your doctor. NOTIFY YOUR PHYSICIAN FOR ANY OF THE FOLLOWING:   Fever over 101 degrees for 24 hours. Chest pain, shortness of breath, fever, chills, nausea, vomiting, diarrhea, change in mentation, falling, weakness, bleeding. Severe pain or pain not relieved by medications. Or, any other signs or symptoms that you may have questions about.       DISPOSITION:  x  Home With:   OT  PT  HH  RN       SNF/Inpatient Rehab/LTAC    Independent/assisted living    Hospice    Other:     CDMP Checked:   Yes x     PROBLEM LIST Updated:  Yes x       Signed:   Dirk Negrete MD  12/1/2023  3:17 PM

## 2023-12-01 NOTE — DISCHARGE SUMMARY
Discharge Summary       PATIENT ID: Sarai Rider  MRN: [id number]   YOB: 1940    DATE OF ADMISSION: 11/30/2023 12:45 AM    DATE OF DISCHARGE: 12/1/2023   PRIMARY CARE PROVIDER: Mahnaz Echeverria MD     ATTENDING PHYSICIAN: Dr Roxianne Cranker  DISCHARGING PROVIDER: Roxianne Cranker, MD    To contact this individual call [phone] and ask the  to page. If unavailable ask to be transferred the Adult Hospitalist Department. CONSULTATIONS: IP CONSULT TO HOSPITALIST    PROCEDURES/SURGERIES: * No surgery found *    ADMITTING [facility] [address] COURSE:   Acute respiratory failure with hypoxia  Tracheobronchitis  -responded well with solumedrol/albuterol  -?adverse reaction with azithro  -Will discharge home    Hyponatremia: mild, outpatient follow up  Anemia likely due to chronic disease.  Outpatient follow up    Discharge to home      PENDING TEST RESULTS:   At the time of discharge the following test results are still pending: none    FOLLOW UP APPOINTMENTS:    PCP    ADDITIONAL CARE RECOMMENDATIONS: none    DIET: regular diet    ACTIVITY: activity as tolerated      DISCHARGE MEDICATIONS:     Medication List        START taking these medications      predniSONE 20 MG tablet  Commonly known as: DELTASONE  Take 2 tablets by mouth daily for 5 days            CONTINUE taking these medications      acetaminophen 325 MG tablet  Commonly known as: TYLENOL     albuterol sulfate  (90 Base) MCG/ACT inhaler  Commonly known as: Ventolin HFA  Inhale 2 puffs into the lungs 4 times daily as needed for Wheezing     ARTIFICIAL TEARS OP     CALCIUM PO     Cholecalciferol 50 MCG (2000 UT) Tabs     docusate 100 MG Caps  Commonly known as: COLACE, DULCOLAX     FLUOCINONIDE EX     guaiFENesin 600 MG extended release tablet  Commonly known as: MUCINEX  Take 2 tablets by mouth 2 times daily for 10 days     lansoprazole 30 MG delayed release capsule  Commonly known as: PREVACID     polyethylene glycol 17 GM/SCOOP powder  Commonly known as: GLYCOLAX     traMADol 50 MG tablet  Commonly known as: ULTRAM     triamcinolone 0.1 % cream  Commonly known as: KENALOG            STOP taking these medications      azithromycin 250 MG tablet  Commonly known as: ZITHROMAX     LORazepam 1 MG tablet  Commonly known as: ATIVAN     PROLIA SC               Where to Get Your Medications        These medications were sent to [facility], [facility]  [id number] [place] [address] S, [address] [place] [address]      Phone: [phone]   predniSONE 20 MG tablet           NOTIFY YOUR PHYSICIAN FOR ANY OF THE FOLLOWING:   Fever over 101 degrees for 24 hours. Chest pain, shortness of breath, fever, chills, nausea, vomiting, diarrhea, change in mentation, falling, weakness, bleeding. Severe pain or pain not relieved by medications. Or, any other signs or symptoms that you may have questions about.     DISPOSITION:    Home With:   OT  PT  HH  RN       Long term SNF/Inpatient Rehab    Independent/assisted living    Hospice    Other:       PATIENT CONDITION AT DISCHARGE:     Functional status    Poor     Deconditioned     Independent      Cognition     Lucid     Forgetful     Dementia      Catheters/lines (plus indication)    Torrez     PICC     PEG     None      Code status     Full code     DNR      PHYSICAL EXAMINATION AT DISCHARGE:    General : alert x 3, awake, no acute distress,   HEENT: PEERL, EOMI, moist mucus membrane, TM clear  Neck: supple, no JVD, no meningeal signs  Chest: Clear to auscultation bilaterally   CVS: S1 S2 heard, Capillary refill less than 2 seconds  Abd: soft/ Non tender, non distended, BS physiological,   Ext: no clubbing, no cyanosis, no edema, brisk 2+ DP pulses  Neuro/Psych: pleasant mood and affect, CN 2-12 grossly intact, sensory grossly within normal limit, Strength 5/5 in all extremities, DTR 1+ x 4  Skin: warm     CHRONIC MEDICAL DIAGNOSES:      Greater than

## 2023-12-02 LAB — ECHO BSA: 1.62 M2

## 2023-12-11 PROBLEM — D64.9 ANEMIA: Status: RESOLVED | Noted: 2023-12-11 | Resolved: 2023-12-11

## 2023-12-11 PROBLEM — D64.9 ANEMIA: Status: ACTIVE | Noted: 2023-12-11

## 2024-01-18 ENCOUNTER — ANESTHESIA EVENT (OUTPATIENT)
Facility: HOSPITAL | Age: 84
End: 2024-01-18
Payer: MEDICARE

## 2024-01-18 ENCOUNTER — HOSPITAL ENCOUNTER (OUTPATIENT)
Facility: HOSPITAL | Age: 84
Setting detail: OUTPATIENT SURGERY
Discharge: HOME | End: 2024-01-18
Attending: INTERNAL MEDICINE | Admitting: INTERNAL MEDICINE
Payer: MEDICARE

## 2024-01-18 ENCOUNTER — ANESTHESIA (OUTPATIENT)
Facility: HOSPITAL | Age: 84
End: 2024-01-18
Payer: MEDICARE

## 2024-01-18 VITALS
WEIGHT: 135 LBS | HEART RATE: 66 BPM | SYSTOLIC BLOOD PRESSURE: 115 MMHG | TEMPERATURE: 97.1 F | HEIGHT: 62 IN | BODY MASS INDEX: 24.84 KG/M2 | RESPIRATION RATE: 17 BRPM | OXYGEN SATURATION: 97 % | DIASTOLIC BLOOD PRESSURE: 65 MMHG

## 2024-01-18 PROCEDURE — 6360000002 HC RX W HCPCS: Performed by: NURSE ANESTHETIST, CERTIFIED REGISTERED

## 2024-01-18 PROCEDURE — 7100000011 HC PHASE II RECOVERY - ADDTL 15 MIN: Performed by: INTERNAL MEDICINE

## 2024-01-18 PROCEDURE — 3600007502: Performed by: INTERNAL MEDICINE

## 2024-01-18 PROCEDURE — 3700000001 HC ADD 15 MINUTES (ANESTHESIA): Performed by: INTERNAL MEDICINE

## 2024-01-18 PROCEDURE — 3700000000 HC ANESTHESIA ATTENDED CARE: Performed by: INTERNAL MEDICINE

## 2024-01-18 PROCEDURE — 3600007512: Performed by: INTERNAL MEDICINE

## 2024-01-18 PROCEDURE — 2580000003 HC RX 258: Performed by: INTERNAL MEDICINE

## 2024-01-18 PROCEDURE — C1726 CATH, BAL DIL, NON-VASCULAR: HCPCS | Performed by: INTERNAL MEDICINE

## 2024-01-18 PROCEDURE — 88305 TISSUE EXAM BY PATHOLOGIST: CPT

## 2024-01-18 PROCEDURE — 7100000010 HC PHASE II RECOVERY - FIRST 15 MIN: Performed by: INTERNAL MEDICINE

## 2024-01-18 PROCEDURE — 2500000003 HC RX 250 WO HCPCS: Performed by: NURSE ANESTHETIST, CERTIFIED REGISTERED

## 2024-01-18 PROCEDURE — 2709999900 HC NON-CHARGEABLE SUPPLY: Performed by: INTERNAL MEDICINE

## 2024-01-18 RX ORDER — SODIUM CHLORIDE 0.9 % (FLUSH) 0.9 %
5-40 SYRINGE (ML) INJECTION PRN
Status: DISCONTINUED | OUTPATIENT
Start: 2024-01-18 | End: 2024-01-18 | Stop reason: HOSPADM

## 2024-01-18 RX ORDER — SODIUM CHLORIDE 9 MG/ML
INJECTION, SOLUTION INTRAVENOUS CONTINUOUS
Status: DISCONTINUED | OUTPATIENT
Start: 2024-01-18 | End: 2024-01-18 | Stop reason: HOSPADM

## 2024-01-18 RX ORDER — SODIUM CHLORIDE 9 MG/ML
25 INJECTION, SOLUTION INTRAVENOUS PRN
Status: DISCONTINUED | OUTPATIENT
Start: 2024-01-18 | End: 2024-01-18 | Stop reason: HOSPADM

## 2024-01-18 RX ORDER — SODIUM CHLORIDE 9 MG/ML
INJECTION, SOLUTION INTRAVENOUS CONTINUOUS PRN
Status: COMPLETED | OUTPATIENT
Start: 2024-01-18 | End: 2024-01-18

## 2024-01-18 RX ORDER — LIDOCAINE HYDROCHLORIDE 20 MG/ML
INJECTION, SOLUTION EPIDURAL; INFILTRATION; INTRACAUDAL; PERINEURAL PRN
Status: DISCONTINUED | OUTPATIENT
Start: 2024-01-18 | End: 2024-01-18 | Stop reason: SDUPTHER

## 2024-01-18 RX ORDER — SODIUM CHLORIDE 0.9 % (FLUSH) 0.9 %
5-40 SYRINGE (ML) INJECTION EVERY 12 HOURS SCHEDULED
Status: DISCONTINUED | OUTPATIENT
Start: 2024-01-18 | End: 2024-01-18 | Stop reason: HOSPADM

## 2024-01-18 RX ADMIN — PROPOFOL 50 MG: 10 INJECTION, EMULSION INTRAVENOUS at 10:38

## 2024-01-18 RX ADMIN — PROPOFOL 30 MG: 10 INJECTION, EMULSION INTRAVENOUS at 10:30

## 2024-01-18 RX ADMIN — PROPOFOL 70 MG: 10 INJECTION, EMULSION INTRAVENOUS at 10:29

## 2024-01-18 RX ADMIN — PROPOFOL 50 MG: 10 INJECTION, EMULSION INTRAVENOUS at 10:33

## 2024-01-18 RX ADMIN — LIDOCAINE HYDROCHLORIDE 50 MG: 20 INJECTION, SOLUTION EPIDURAL; INFILTRATION; INTRACAUDAL; PERINEURAL at 10:29

## 2024-01-18 ASSESSMENT — PAIN - FUNCTIONAL ASSESSMENT: PAIN_FUNCTIONAL_ASSESSMENT: NONE - DENIES PAIN

## 2024-01-18 NOTE — DISCHARGE INSTRUCTIONS
[facility]  [address]  [place], [place] [id number] [phone]                                  Aundrea Simon  [id number]  1940    It was my pleasure seeing you for your procedure.  You will also receive a summary report with the findings from this procedure and any further recommendations.  If you had polyps removed or biopsies taken during your procedure, you will receive a separate letter from me within approximately the next 2 weeks.  If you don't receive this letter or if you have any questions, please call my office [phone].     Please take note of the post procedure instructions listed below.    Dain ReyesesDr. Lewis      CARE FOLLOWING YOUR PROCEDURE    These instructions give you information on caring for yourself after your procedure. Call your doctor if you have any problems or questions after your procedure.    HOME CARE  Walk if you have belly cramping or gas.  Walking will help get rid of the air and reduce the bloated feeling in your belly (abdomen).  Your IV site (where you received drugs) may be tender to touch.  Place warm towels on the site; keep your arm up on two pillows if you have any swelling or soreness in the area.  You may shower.    ACTIVITY:  Take frequent rest periods and move at a slower pace for the next 24 hours..  You may resume your regular activity tomorrow if you are feeling back to normal.  Do not drive or ride a bicycle for at least 24 hours (because of the medicine (anesthesia) used during the test).  Do not sign any important legal documents or use or operate any machinery for 24 hours  Do not take sleeping medicines/nerve drugs for 24 hours unless the doctor tells you.  You can return to work/school tomorrow unless otherwise instructed.    NUTRITION:  Drink plenty of fluids to keep your pee (urine) clear or pale yellow  Begin with a light meal and progress to your normal diet. Heavy or fried foods are harder to digest and may

## 2024-01-18 NOTE — ANESTHESIA POSTPROCEDURE EVALUATION
Department of Anesthesiology  Postprocedure Note    Patient: Aundrea Simon  MRN: [id number]  YOB: 1940  Date of evaluation: 1/18/2024    Procedure Summary       Date: 01/18/24 Room / Location: [place] [address] / [facility] ENDOSCOPY    Anesthesia Start: 1025 Anesthesia Stop: 1043    Procedure: EGD ESOPHAGOGASTRODUODENOSCOPY (Upper GI Region) Diagnosis:       Gastroesophageal reflux disease, unspecified whether esophagitis present      Oropharyngeal dysphagia      (Gastroesophageal reflux disease, unspecified whether esophagitis present [K21.9])      (Oropharyngeal dysphagia [R13.12])    Surgeons: Lori Lewis MD Responsible Provider: Freddie Morejon MD    Anesthesia Type: MAC ASA Status: 2            Anesthesia Type: MAC    Krystal Phase I: Krystal Score: 10    Krystal Phase II: Krystal Score: 10    Anesthesia Post Evaluation    Patient location during evaluation: PACU  Patient participation: complete - patient participated  Level of consciousness: awake  Pain score: 0  Airway patency: patent  Nausea & Vomiting: no nausea  Cardiovascular status: blood pressure returned to baseline and hemodynamically stable  Respiratory status: acceptable  Hydration status: stable  Pain management: adequate and satisfactory to patient    No notable events documented.

## 2024-01-18 NOTE — ANESTHESIA PRE PROCEDURE
Department of Anesthesiology  Preprocedure Note       Name:  Aundrea Siomn   Age:  83 y.o.  :  1940                                          MRN:  [id number]         Date:  2024      Surgeon: Surgeon(s):  Lori Lewis MD    Procedure: Procedure(s):  EGD ESOPHAGOGASTRODUODENOSCOPY    Medications prior to admission:   Prior to Admission medications    Medication Sig Start Date End Date Taking? Authorizing Provider   ondansetron (ZOFRAN-ODT) 4 MG disintegrating tablet Take 1 tablet by mouth 3 times daily as needed for Nausea or Vomiting 24   BARB Hernandez MD   predniSONE (DELTASONE) 20 MG tablet Take 3 tabs daily days 1-3, take 2 tabs daily days 4-6, take one tab days 7-9, take 1/2 tab days 10-12 23   Coby Batista MD   fluticasone-salmeterol (ADVAIR DISKUS) 250-50 MCG/ACT AEPB diskus inhaler Inhale 1 puff into the lungs in the morning and 1 puff in the evening. 23   Coby Batista MD   guaiFENesin (MUCINEX) 600 MG extended release tablet Take 2 tablets by mouth 2 times daily as needed for Congestion 23   BARB Hernandez MD   lansoprazole (PREVACID) 30 MG delayed release capsule Take 1 capsule by mouth 2 times daily    Diego Ruff MD   albuterol sulfate HFA (VENTOLIN HFA) 108 (90 Base) MCG/ACT inhaler Inhale 2 puffs into the lungs 4 times daily as needed for Wheezing 23   BARB Hernandez MD   traMADol (ULTRAM) 50 MG tablet Take 2 tablets by mouth 2 times daily as needed for Pain.    Diego Ruff MD   Carboxymethylcellulose Sodium (ARTIFICIAL TEARS OP) Apply to eye 7-8 times daily.    Diego Ruff MD   CALCIUM PO Take by mouth    Diego Ruff MD   FLUOCINONIDE EX Apply topically 2 times daily    Diego Ruff MD   acetaminophen (TYLENOL) 325 MG tablet Take by mouth every 8 hours    Automatic Reconciliation, Ar   Cholecalciferol 50 MCG ( UT) TABS Take 1 tablet by mouth daily    Automatic

## 2024-01-18 NOTE — PROGRESS NOTES

## 2024-01-18 NOTE — H&P
[facility]  [address]  [place] [address] [phone]                                History and Physical     NAME: Aundrea Simon   :  1940   MRN:  [id number]     HPI:  The patient was seen and examined.    Past Surgical History:   Procedure Laterality Date    BLD CARPEL TUNNEL RELEASE      CATARACT REMOVAL  2012    COLONOSCOPY N/A 2017    COLONOSCOPY performed by Momo Marie MD at [facility] ENDOSCOPY    GYN      D&C    MOHS SURGERY  2017    BCC left cheek by Dr. Villalta     Past Medical History:   Diagnosis Date    Diverticulitis     Dystrophy, cornea     Environmental allergies     Family history of skin cancer     GERD (gastroesophageal reflux disease)     Hypotension     Ill-defined condition     Hyponatremia    Menopause     LMP-unknown    Multiple drug allergies     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     Social History     Tobacco Use    Smoking status: Never    Smokeless tobacco: Never   Substance Use Topics    Alcohol use: No    Drug use: No     Allergies   Allergen Reactions    Clindamycin Anaphylaxis    Diclofenac Sodium Other (See Comments)     Mild throat closing and severe nausea    Doxycycline Anaphylaxis    Esomeprazole Magnesium Anaphylaxis    Levocetirizine Anaphylaxis    Levofloxacin Anaphylaxis    Oxycodone Nausea Only    Penicillins Anaphylaxis    Pseudoephedrine Hcl Anaphylaxis    Telithromycin Anaphylaxis    Cefuroxime Axetil      Other reaction(s): Unknown (comments)    Cortisone Rash     Can take depro medrol if preservative free only    Doxycycline Calcium Rash     Facial rash    Meloxicam Other (See Comments)     Throat closing, severe nausea, abd pain and facial swelling    Methylprednisolone Rash     Facial rash    Romosozumab Hives and Rash     Jaw pain

## 2024-01-18 NOTE — PROGRESS NOTES
Verified patient name and date of birth, scheduled procedure, and informed consent. Reviewed general discharge instructions and  information.  Assessed patient. Awake, alert, and oriented per baseline. Vital signs stable (see vital sign flowsheet). Respiratory status within defined limits, abdomen soft and non tender. Skin with in defined limits.

## 2024-01-18 NOTE — OP NOTE
[facility]  [address]  [place] [address]  [phone]                              Esophagogastroduodenoscopy (EGD) Procedure Note    NAME:  Aundrea Simon     :   1940     MRN:   [id number]     Date/Time:  2024 10:58 AM    :  Lori Lewis MD    Staff: Circulator: Angie Angulo RN  Endoscopy Technician: Rj Chinchilla    Referring Provider:  BARB Hernandez MD    Anethesia/Sedation:  MAC anesthesia    Procedure Details   After infomed consent was obtained for the procedure, with all risks and benefits of procedure explained the patient was taken to the endoscopy suite and placed in the left lateral decubitus position.  Following sequential administration of sedation as per above, the gastroscope was inserted into the mouth and advanced under direct vision to second portion of the duodenum.  A careful inspection was made as the gastroscope was withdrawn, including a retroflexed view of the proximal stomach; findings and interventions are described below.      Findings:  Esophagus: normal esophageal mucosa - biopsied. No evidence of obvious stenosis or esophagitis. Subtle non-obstructing ring at GE junction, dilated with 18 mm balloon dilator   Stomach: mild gastritis - biopsied   Duodenum/jejunum:normal    Interventions:  biopsies and dilation            Specimens Removed:    ID Type Source Tests Collected by Time Destination   1 : biopsy Tissue Gastric SURGICAL PATHOLOGY Lori Lewis MD 2024 1034    2 : biopsy Tissue Esophagus SURGICAL PATHOLOGY Lori Lewis MD 2024 1041        Complications: None.     EBL:  minimal     Impression:    See Postoperative diagnosis above    Recommendations:   - Await pathology. You should receive a letter within 2 weeks.   - Resume normal medications.    Discharge disposition:  Home in the company of  when able to ambulate    Lori Lewis MD

## 2024-01-19 ENCOUNTER — HOSPITAL ENCOUNTER (OUTPATIENT)
Facility: HOSPITAL | Age: 84
End: 2024-01-19
Payer: MEDICARE

## 2024-01-19 DIAGNOSIS — R05.9 COUGH, UNSPECIFIED TYPE: ICD-10-CM

## 2024-01-19 PROCEDURE — 71046 X-RAY EXAM CHEST 2 VIEWS: CPT

## 2024-02-02 PROBLEM — L12.1 OCP (OCULAR CICATRICIAL PEMPHIGOID): Status: ACTIVE | Noted: 2024-02-02

## 2024-02-24 ENCOUNTER — HOSPITAL ENCOUNTER (OUTPATIENT)
Facility: HOSPITAL | Age: 84
End: 2024-02-24
Attending: ANESTHESIOLOGY
Payer: MEDICARE

## 2024-02-24 DIAGNOSIS — M47.812 CERVICAL SPONDYLOSIS: ICD-10-CM

## 2024-02-24 PROCEDURE — 72141 MRI NECK SPINE W/O DYE: CPT

## 2024-03-26 PROBLEM — G47.09 OTHER INSOMNIA: Status: ACTIVE | Noted: 2024-03-26

## 2024-03-26 PROBLEM — J44.9 CHRONIC OBSTRUCTIVE PULMONARY DISEASE, UNSPECIFIED (HCC): Status: ACTIVE | Noted: 2024-03-26

## 2024-04-29 PROBLEM — D50.9 IRON DEFICIENCY ANEMIA: Status: ACTIVE | Noted: 2024-04-29

## 2024-05-26 ENCOUNTER — HOSPITAL ENCOUNTER (OUTPATIENT)
Facility: HOSPITAL | Age: 84
Discharge: HOME | End: 2024-05-29
Attending: ANESTHESIOLOGY
Payer: MEDICARE

## 2024-05-26 DIAGNOSIS — M54.16 RADICULOPATHY, LUMBAR REGION: ICD-10-CM

## 2024-05-26 PROCEDURE — 72148 MRI LUMBAR SPINE W/O DYE: CPT

## 2024-08-03 ENCOUNTER — HOSPITAL ENCOUNTER (EMERGENCY)
Facility: HOSPITAL | Age: 84
Discharge: HOME | End: 2024-08-03
Attending: EMERGENCY MEDICINE
Payer: MEDICARE

## 2024-08-03 ENCOUNTER — APPOINTMENT (OUTPATIENT)
Facility: HOSPITAL | Age: 84
End: 2024-08-03
Payer: MEDICARE

## 2024-08-03 VITALS
RESPIRATION RATE: 11 BRPM | HEIGHT: 62 IN | HEART RATE: 72 BPM | SYSTOLIC BLOOD PRESSURE: 106 MMHG | OXYGEN SATURATION: 91 % | DIASTOLIC BLOOD PRESSURE: 74 MMHG | WEIGHT: 135.36 LBS | BODY MASS INDEX: 24.91 KG/M2 | TEMPERATURE: 97.8 F

## 2024-08-03 DIAGNOSIS — E86.0 DEHYDRATION: ICD-10-CM

## 2024-08-03 DIAGNOSIS — R51.9 ACUTE NONINTRACTABLE HEADACHE, UNSPECIFIED HEADACHE TYPE: Primary | ICD-10-CM

## 2024-08-03 LAB
ALBUMIN SERPL-MCNC: 3.7 G/DL (ref 3.5–5)
ALBUMIN/GLOB SERPL: 0.9 (ref 1.1–2.2)
ALP SERPL-CCNC: 64 U/L (ref 45–117)
ALT SERPL-CCNC: 19 U/L (ref 12–78)
ANION GAP SERPL CALC-SCNC: 5 MMOL/L (ref 5–15)
AST SERPL-CCNC: 33 U/L (ref 15–37)
BILIRUB SERPL-MCNC: 0.7 MG/DL (ref 0.2–1)
BUN SERPL-MCNC: 12 MG/DL (ref 6–20)
BUN/CREAT SERPL: 15 (ref 12–20)
CALCIUM SERPL-MCNC: 9.6 MG/DL (ref 8.5–10.1)
CHLORIDE SERPL-SCNC: 100 MMOL/L (ref 97–108)
CO2 SERPL-SCNC: 26 MMOL/L (ref 21–32)
COMMENT:: NORMAL
CREAT SERPL-MCNC: 0.81 MG/DL (ref 0.55–1.02)
ERYTHROCYTE [DISTWIDTH] IN BLOOD BY AUTOMATED COUNT: 16.9 % (ref 11.5–14.5)
GLOBULIN SER CALC-MCNC: 4 G/DL (ref 2–4)
GLUCOSE SERPL-MCNC: 75 MG/DL (ref 65–100)
HCT VFR BLD AUTO: 35.8 % (ref 35–47)
HGB BLD-MCNC: 11.7 G/DL (ref 11.5–16)
INR PPP: 1 (ref 0.9–1.1)
MCH RBC QN AUTO: 28.4 PG (ref 26–34)
MCHC RBC AUTO-ENTMCNC: 32.7 G/DL (ref 30–36.5)
MCV RBC AUTO: 86.9 FL (ref 80–99)
NRBC # BLD: 0 K/UL (ref 0–0.01)
NRBC BLD-RTO: 0 PER 100 WBC
PLATELET # BLD AUTO: 169 K/UL (ref 150–400)
PMV BLD AUTO: 11 FL (ref 8.9–12.9)
POTASSIUM SERPL-SCNC: 4.5 MMOL/L (ref 3.5–5.1)
PROT SERPL-MCNC: 7.7 G/DL (ref 6.4–8.2)
PROTHROMBIN TIME: 10.9 SEC (ref 9–11.1)
RBC # BLD AUTO: 4.12 M/UL (ref 3.8–5.2)
SODIUM SERPL-SCNC: 131 MMOL/L (ref 136–145)
SPECIMEN HOLD: NORMAL
WBC # BLD AUTO: 5.5 K/UL (ref 3.6–11)

## 2024-08-03 PROCEDURE — 99285 EMERGENCY DEPT VISIT HI MDM: CPT

## 2024-08-03 PROCEDURE — 36415 COLL VENOUS BLD VENIPUNCTURE: CPT

## 2024-08-03 PROCEDURE — 6360000002 HC RX W HCPCS: Performed by: EMERGENCY MEDICINE

## 2024-08-03 PROCEDURE — 80053 COMPREHEN METABOLIC PANEL: CPT

## 2024-08-03 PROCEDURE — 85610 PROTHROMBIN TIME: CPT

## 2024-08-03 PROCEDURE — 96361 HYDRATE IV INFUSION ADD-ON: CPT

## 2024-08-03 PROCEDURE — 70450 CT HEAD/BRAIN W/O DYE: CPT

## 2024-08-03 PROCEDURE — 70498 CT ANGIOGRAPHY NECK: CPT

## 2024-08-03 PROCEDURE — 2580000003 HC RX 258: Performed by: EMERGENCY MEDICINE

## 2024-08-03 PROCEDURE — 85027 COMPLETE CBC AUTOMATED: CPT

## 2024-08-03 PROCEDURE — 96374 THER/PROPH/DIAG INJ IV PUSH: CPT

## 2024-08-03 PROCEDURE — 6360000004 HC RX CONTRAST MEDICATION: Performed by: RADIOLOGY

## 2024-08-03 PROCEDURE — 6370000000 HC RX 637 (ALT 250 FOR IP): Performed by: EMERGENCY MEDICINE

## 2024-08-03 RX ORDER — BUTALBITAL, ACETAMINOPHEN AND CAFFEINE 50; 325; 40 MG/1; MG/1; MG/1
1 TABLET ORAL
Status: COMPLETED | OUTPATIENT
Start: 2024-08-03 | End: 2024-08-03

## 2024-08-03 RX ORDER — PROCHLORPERAZINE EDISYLATE 5 MG/ML
10 INJECTION INTRAMUSCULAR; INTRAVENOUS ONCE
Status: DISCONTINUED | OUTPATIENT
Start: 2024-08-03 | End: 2024-08-03

## 2024-08-03 RX ORDER — FENTANYL CITRATE 50 UG/ML
50 INJECTION, SOLUTION INTRAMUSCULAR; INTRAVENOUS
Status: COMPLETED | OUTPATIENT
Start: 2024-08-03 | End: 2024-08-03

## 2024-08-03 RX ORDER — DIPHENHYDRAMINE HYDROCHLORIDE 50 MG/ML
25 INJECTION INTRAMUSCULAR; INTRAVENOUS
Status: DISCONTINUED | OUTPATIENT
Start: 2024-08-03 | End: 2024-08-03

## 2024-08-03 RX ORDER — 0.9 % SODIUM CHLORIDE 0.9 %
1000 INTRAVENOUS SOLUTION INTRAVENOUS ONCE
Status: COMPLETED | OUTPATIENT
Start: 2024-08-03 | End: 2024-08-03

## 2024-08-03 RX ORDER — BUTALBITAL, ACETAMINOPHEN AND CAFFEINE 50; 325; 40 MG/1; MG/1; MG/1
1 TABLET ORAL EVERY 4 HOURS PRN
Qty: 20 TABLET | Refills: 3 | Status: SHIPPED | OUTPATIENT
Start: 2024-08-03

## 2024-08-03 RX ORDER — 0.9 % SODIUM CHLORIDE 0.9 %
500 INTRAVENOUS SOLUTION INTRAVENOUS ONCE
Status: DISCONTINUED | OUTPATIENT
Start: 2024-08-03 | End: 2024-08-03

## 2024-08-03 RX ADMIN — FENTANYL CITRATE 50 MCG: 50 INJECTION INTRAMUSCULAR; INTRAVENOUS at 08:29

## 2024-08-03 RX ADMIN — BUTALBITAL, ACETAMINOPHEN, AND CAFFEINE 1 TABLET: 50; 325; 40 TABLET ORAL at 11:27

## 2024-08-03 RX ADMIN — SODIUM CHLORIDE 1000 ML: 9 INJECTION, SOLUTION INTRAVENOUS at 11:27

## 2024-08-03 RX ADMIN — IOPAMIDOL 80 ML: 755 INJECTION, SOLUTION INTRAVENOUS at 10:34

## 2024-08-03 ASSESSMENT — PAIN DESCRIPTION - FREQUENCY: FREQUENCY: CONTINUOUS

## 2024-08-03 ASSESSMENT — PAIN - FUNCTIONAL ASSESSMENT
PAIN_FUNCTIONAL_ASSESSMENT: PREVENTS OR INTERFERES SOME ACTIVE ACTIVITIES AND ADLS
PAIN_FUNCTIONAL_ASSESSMENT: 0-10

## 2024-08-03 ASSESSMENT — PAIN SCALES - GENERAL: PAINLEVEL_OUTOF10: 6

## 2024-08-03 ASSESSMENT — PAIN DESCRIPTION - PAIN TYPE: TYPE: ACUTE PAIN

## 2024-08-03 ASSESSMENT — PAIN DESCRIPTION - ORIENTATION: ORIENTATION: ANTERIOR

## 2024-08-03 ASSESSMENT — PAIN DESCRIPTION - LOCATION: LOCATION: HEAD

## 2024-08-03 ASSESSMENT — PAIN DESCRIPTION - ONSET: ONSET: SUDDEN

## 2024-08-03 ASSESSMENT — PAIN DESCRIPTION - DESCRIPTORS: DESCRIPTORS: ACHING

## 2024-08-03 NOTE — ED TRIAGE NOTES
Patient reports sudden onset headache that started yesterday morning at 0600. Says she took Tylenol and Tramadol at 0100 which helped a little. Says she had a spinal tap about 20 yrs ago and this feels similar. Denies any N/V/D.

## 2024-08-03 NOTE — ED PROVIDER NOTES
[facility] EMERGENCY DEP  EMERGENCY DEPARTMENT ENCOUNTER      Pt Name: Aundrea Simon  MRN: [id number]  Birthdate 1940  Date of evaluation: 8/3/2024  Provider: Chuck Loredo DO      HISTORY OF PRESENT ILLNESS      HPI    84-year-old female presents to the emergency department complaining a headache which started yesterday morning at about 6 AM.  She states that she has pain at the top of her head that seems to get worse when she stands up or lays down in certain positions.  She states that she had a spinal tap about 20 years ago and had a post LP headache and states that this feels similar.  She denies any falls or head trauma.  Denies any light sensitivity, nausea, vomiting, diarrhea, numbness, weakness.  She states that she has taken Tylenol and tramadol which helped a little but not significantly improved.  No fever or URI symptoms or any other medical concerns.  She denies any history of migraines or regular headaches.    Nursing Notes were reviewed.    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 [facility]    Sun-damaged skin     Vertebral compression fracture (HCC) 01/05/12    Vocal cord anomaly     vocal cord dysfunction per speech therapist         SURGICAL HISTORY       Past Surgical History:   Procedure Laterality Date    BLD CARPEL TUNNEL RELEASE      CATARACT REMOVAL  07/2012    COLONOSCOPY N/A 7/7/2017    COLONOSCOPY performed by Momo Marie MD at [facility] ENDOSCOPY    GYN      D&C    MOHS SURGERY  04/11/2017    BCC left cheek by Dr. Villalta    UPPER GASTROINTESTINAL  at the bedside and they stated both understanding and agreement.      REASSESSMENT          CONSULTS:  None    PROCEDURES:     Procedures            (Please note that portions of this note were completed with a voice recognition program.  Efforts were made to edit the dictations but occasionally words are mis-transcribed.)    Chuck Loredo DO (electronically signed)  Emergency Attending Physician              Chuck Loredo DO  08/03/24 7689

## 2025-01-24 ENCOUNTER — HOSPITAL ENCOUNTER (EMERGENCY)
Facility: HOSPITAL | Age: 85
Discharge: HOME | End: 2025-01-25
Attending: STUDENT IN AN ORGANIZED HEALTH CARE EDUCATION/TRAINING PROGRAM
Payer: MEDICARE

## 2025-01-24 ENCOUNTER — APPOINTMENT (OUTPATIENT)
Facility: HOSPITAL | Age: 85
End: 2025-01-24
Payer: MEDICARE

## 2025-01-24 DIAGNOSIS — R20.0 SADDLE ANESTHESIA: ICD-10-CM

## 2025-01-24 DIAGNOSIS — M54.41 LOW BACK PAIN WITH RIGHT-SIDED SCIATICA, UNSPECIFIED BACK PAIN LATERALITY, UNSPECIFIED CHRONICITY: Primary | ICD-10-CM

## 2025-01-24 LAB
ALBUMIN SERPL-MCNC: 3.8 G/DL (ref 3.5–5)
ALBUMIN/GLOB SERPL: 1.1 (ref 1.1–2.2)
ALP SERPL-CCNC: 52 U/L (ref 45–117)
ALT SERPL-CCNC: 18 U/L (ref 12–78)
ANION GAP SERPL CALC-SCNC: 4 MMOL/L (ref 2–12)
APPEARANCE UR: ABNORMAL
AST SERPL-CCNC: 16 U/L (ref 15–37)
BACTERIA URNS QL MICRO: NEGATIVE /HPF
BASOPHILS # BLD: 0.06 K/UL (ref 0–0.1)
BASOPHILS NFR BLD: 1.2 % (ref 0–1)
BILIRUB SERPL-MCNC: 0.3 MG/DL (ref 0.2–1)
BILIRUB UR QL: NEGATIVE
BUN SERPL-MCNC: 12 MG/DL (ref 6–20)
BUN/CREAT SERPL: 14 (ref 12–20)
CALCIUM SERPL-MCNC: 9.1 MG/DL (ref 8.5–10.1)
CHLORIDE SERPL-SCNC: 102 MMOL/L (ref 97–108)
CO2 SERPL-SCNC: 26 MMOL/L (ref 21–32)
COLOR UR: ABNORMAL
COMMENT:: NORMAL
CREAT SERPL-MCNC: 0.85 MG/DL (ref 0.55–1.02)
DIFFERENTIAL METHOD BLD: ABNORMAL
EOSINOPHIL # BLD: 0.56 K/UL (ref 0–0.4)
EOSINOPHIL NFR BLD: 11.6 % (ref 0–7)
EPITH CASTS URNS QL MICRO: ABNORMAL /LPF
ERYTHROCYTE [DISTWIDTH] IN BLOOD BY AUTOMATED COUNT: 14.1 % (ref 11.5–14.5)
GLOBULIN SER CALC-MCNC: 3.6 G/DL (ref 2–4)
GLUCOSE SERPL-MCNC: 112 MG/DL (ref 65–100)
GLUCOSE UR STRIP.AUTO-MCNC: NEGATIVE MG/DL
HCT VFR BLD AUTO: 35.3 % (ref 35–47)
HGB BLD-MCNC: 11.9 G/DL (ref 11.5–16)
HGB UR QL STRIP: NEGATIVE
HYALINE CASTS URNS QL MICRO: ABNORMAL /LPF (ref 0–5)
IMM GRANULOCYTES # BLD AUTO: 0.01 K/UL (ref 0–0.04)
IMM GRANULOCYTES NFR BLD AUTO: 0.2 % (ref 0–0.5)
KETONES UR QL STRIP.AUTO: NEGATIVE MG/DL
LEUKOCYTE ESTERASE UR QL STRIP.AUTO: ABNORMAL
LYMPHOCYTES # BLD: 1.37 K/UL (ref 0.8–3.5)
LYMPHOCYTES NFR BLD: 28.5 % (ref 12–49)
MCH RBC QN AUTO: 30.6 PG (ref 26–34)
MCHC RBC AUTO-ENTMCNC: 33.7 G/DL (ref 30–36.5)
MCV RBC AUTO: 90.7 FL (ref 80–99)
MONOCYTES # BLD: 0.41 K/UL (ref 0–1)
MONOCYTES NFR BLD: 8.5 % (ref 5–13)
NEUTS SEG # BLD: 2.4 K/UL (ref 1.8–8)
NEUTS SEG NFR BLD: 50 % (ref 32–75)
NITRITE UR QL STRIP.AUTO: NEGATIVE
NRBC # BLD: 0 K/UL (ref 0–0.01)
NRBC BLD-RTO: 0 PER 100 WBC
PH UR STRIP: 7 (ref 5–8)
PLATELET # BLD AUTO: 217 K/UL (ref 150–400)
PMV BLD AUTO: 10.3 FL (ref 8.9–12.9)
POTASSIUM SERPL-SCNC: 3.9 MMOL/L (ref 3.5–5.1)
PROT SERPL-MCNC: 7.4 G/DL (ref 6.4–8.2)
PROT UR STRIP-MCNC: NEGATIVE MG/DL
RBC # BLD AUTO: 3.89 M/UL (ref 3.8–5.2)
RBC #/AREA URNS HPF: ABNORMAL /HPF (ref 0–5)
SODIUM SERPL-SCNC: 132 MMOL/L (ref 136–145)
SP GR UR REFRACTOMETRY: 1.02 (ref 1–1.03)
SPECIMEN HOLD: NORMAL
SPECIMEN HOLD: NORMAL
UROBILINOGEN UR QL STRIP.AUTO: 0.2 EU/DL (ref 0.2–1)
WBC # BLD AUTO: 4.8 K/UL (ref 3.6–11)
WBC URNS QL MICRO: ABNORMAL /HPF (ref 0–4)

## 2025-01-24 PROCEDURE — 6360000002 HC RX W HCPCS: Performed by: STUDENT IN AN ORGANIZED HEALTH CARE EDUCATION/TRAINING PROGRAM

## 2025-01-24 PROCEDURE — 72158 MRI LUMBAR SPINE W/O & W/DYE: CPT

## 2025-01-24 PROCEDURE — 6370000000 HC RX 637 (ALT 250 FOR IP): Performed by: STUDENT IN AN ORGANIZED HEALTH CARE EDUCATION/TRAINING PROGRAM

## 2025-01-24 PROCEDURE — 36415 COLL VENOUS BLD VENIPUNCTURE: CPT

## 2025-01-24 PROCEDURE — 80053 COMPREHEN METABOLIC PANEL: CPT

## 2025-01-24 PROCEDURE — 96374 THER/PROPH/DIAG INJ IV PUSH: CPT

## 2025-01-24 PROCEDURE — 85025 COMPLETE CBC W/AUTO DIFF WBC: CPT

## 2025-01-24 PROCEDURE — 96375 TX/PRO/DX INJ NEW DRUG ADDON: CPT

## 2025-01-24 PROCEDURE — 99285 EMERGENCY DEPT VISIT HI MDM: CPT

## 2025-01-24 PROCEDURE — 81001 URINALYSIS AUTO W/SCOPE: CPT

## 2025-01-24 RX ORDER — OXYCODONE AND ACETAMINOPHEN 5; 325 MG/1; MG/1
1 TABLET ORAL ONCE
Status: COMPLETED | OUTPATIENT
Start: 2025-01-24 | End: 2025-01-24

## 2025-01-24 RX ORDER — KETOROLAC TROMETHAMINE 30 MG/ML
10 INJECTION, SOLUTION INTRAMUSCULAR; INTRAVENOUS ONCE
Status: COMPLETED | OUTPATIENT
Start: 2025-01-24 | End: 2025-01-24

## 2025-01-24 RX ORDER — FENTANYL CITRATE 50 UG/ML
50 INJECTION, SOLUTION INTRAMUSCULAR; INTRAVENOUS
Status: DISCONTINUED | OUTPATIENT
Start: 2025-01-24 | End: 2025-01-25 | Stop reason: HOSPADM

## 2025-01-24 RX ORDER — OXYCODONE AND ACETAMINOPHEN 5; 325 MG/1; MG/1
1 TABLET ORAL
Status: COMPLETED | OUTPATIENT
Start: 2025-01-24 | End: 2025-01-24

## 2025-01-24 RX ADMIN — FENTANYL CITRATE 25 MCG: 50 INJECTION INTRAMUSCULAR; INTRAVENOUS at 23:21

## 2025-01-24 RX ADMIN — OXYCODONE HYDROCHLORIDE AND ACETAMINOPHEN 1 TABLET: 5; 325 TABLET ORAL at 21:01

## 2025-01-24 RX ADMIN — OXYCODONE HYDROCHLORIDE AND ACETAMINOPHEN 1 TABLET: 5; 325 TABLET ORAL at 22:05

## 2025-01-24 RX ADMIN — KETOROLAC TROMETHAMINE 9.9 MG: 30 INJECTION, SOLUTION INTRAMUSCULAR at 21:34

## 2025-01-24 ASSESSMENT — PAIN SCALES - GENERAL
PAINLEVEL_OUTOF10: 10

## 2025-01-24 ASSESSMENT — PAIN DESCRIPTION - ORIENTATION: ORIENTATION: RIGHT;LOWER

## 2025-01-24 ASSESSMENT — PAIN DESCRIPTION - PAIN TYPE: TYPE: NEUROPATHIC PAIN;CHRONIC PAIN

## 2025-01-24 ASSESSMENT — PAIN - FUNCTIONAL ASSESSMENT
PAIN_FUNCTIONAL_ASSESSMENT: 0-10
PAIN_FUNCTIONAL_ASSESSMENT: ACTIVITIES ARE NOT PREVENTED

## 2025-01-24 ASSESSMENT — PAIN DESCRIPTION - LOCATION: LOCATION: BACK;LEG

## 2025-01-24 ASSESSMENT — PAIN DESCRIPTION - FREQUENCY: FREQUENCY: INTERMITTENT

## 2025-01-24 ASSESSMENT — PAIN DESCRIPTION - DESCRIPTORS: DESCRIPTORS: ACHING;DISCOMFORT

## 2025-01-25 VITALS
WEIGHT: 134.04 LBS | BODY MASS INDEX: 24.67 KG/M2 | HEIGHT: 62 IN | DIASTOLIC BLOOD PRESSURE: 71 MMHG | RESPIRATION RATE: 19 BRPM | HEART RATE: 95 BPM | TEMPERATURE: 98.1 F | SYSTOLIC BLOOD PRESSURE: 116 MMHG | OXYGEN SATURATION: 99 %

## 2025-01-25 PROCEDURE — 6360000002 HC RX W HCPCS: Performed by: EMERGENCY MEDICINE

## 2025-01-25 PROCEDURE — 96376 TX/PRO/DX INJ SAME DRUG ADON: CPT

## 2025-01-25 PROCEDURE — A9579 GAD-BASE MR CONTRAST NOS,1ML: HCPCS | Performed by: STUDENT IN AN ORGANIZED HEALTH CARE EDUCATION/TRAINING PROGRAM

## 2025-01-25 PROCEDURE — 2500000003 HC RX 250 WO HCPCS: Performed by: EMERGENCY MEDICINE

## 2025-01-25 PROCEDURE — 6360000004 HC RX CONTRAST MEDICATION: Performed by: STUDENT IN AN ORGANIZED HEALTH CARE EDUCATION/TRAINING PROGRAM

## 2025-01-25 PROCEDURE — 96375 TX/PRO/DX INJ NEW DRUG ADDON: CPT

## 2025-01-25 PROCEDURE — 6370000000 HC RX 637 (ALT 250 FOR IP): Performed by: EMERGENCY MEDICINE

## 2025-01-25 RX ORDER — OXYCODONE AND ACETAMINOPHEN 5; 325 MG/1; MG/1
2 TABLET ORAL
Status: COMPLETED | OUTPATIENT
Start: 2025-01-25 | End: 2025-01-25

## 2025-01-25 RX ORDER — LIDOCAINE 50 MG/G
1 PATCH TOPICAL DAILY
Qty: 10 PATCH | Refills: 0 | Status: SHIPPED | OUTPATIENT
Start: 2025-01-25 | End: 2025-02-04

## 2025-01-25 RX ORDER — CYCLOBENZAPRINE HCL 5 MG
10 TABLET ORAL 3 TIMES DAILY PRN
Qty: 20 TABLET | Refills: 0 | Status: SHIPPED | OUTPATIENT
Start: 2025-01-25 | End: 2025-01-30

## 2025-01-25 RX ORDER — ONDANSETRON 2 MG/ML
4 INJECTION INTRAMUSCULAR; INTRAVENOUS ONCE
Status: COMPLETED | OUTPATIENT
Start: 2025-01-25 | End: 2025-01-25

## 2025-01-25 RX ORDER — ONDANSETRON 4 MG/1
4 TABLET, ORALLY DISINTEGRATING ORAL EVERY 6 HOURS PRN
Qty: 15 TABLET | Refills: 0 | Status: SHIPPED | OUTPATIENT
Start: 2025-01-25

## 2025-01-25 RX ORDER — OXYCODONE AND ACETAMINOPHEN 7.5; 325 MG/1; MG/1
1 TABLET ORAL EVERY 6 HOURS PRN
Qty: 12 TABLET | Refills: 0 | Status: SHIPPED | OUTPATIENT
Start: 2025-01-25 | End: 2025-02-24

## 2025-01-25 RX ORDER — PREDNISONE 10 MG/1
TABLET ORAL
Qty: 60 TABLET | Refills: 0 | Status: SHIPPED | OUTPATIENT
Start: 2025-01-25

## 2025-01-25 RX ADMIN — OXYCODONE HYDROCHLORIDE AND ACETAMINOPHEN 2 TABLET: 5; 325 TABLET ORAL at 04:21

## 2025-01-25 RX ADMIN — ONDANSETRON 4 MG: 2 INJECTION INTRAMUSCULAR; INTRAVENOUS at 02:42

## 2025-01-25 RX ADMIN — ONDANSETRON 4 MG: 2 INJECTION INTRAMUSCULAR; INTRAVENOUS at 00:26

## 2025-01-25 RX ADMIN — GADOTERIDOL 12 ML: 279.3 INJECTION, SOLUTION INTRAVENOUS at 00:09

## 2025-01-25 RX ADMIN — WATER 125 MG: 1 INJECTION INTRAMUSCULAR; INTRAVENOUS; SUBCUTANEOUS at 02:43

## 2025-01-25 ASSESSMENT — PAIN SCALES - GENERAL: PAINLEVEL_OUTOF10: 4

## 2025-01-25 NOTE — ED NOTES
8:12 PM  I have evaluated the patient as the Provider in Rapid Medical Evaluation (RME). I have reviewed her vital signs and the triage nurse assessment. I have talked with the patient and any available family and advised that I am the provider in triage and have ordered the appropriate study to initiate their work up based on the clinical presentation during my assessment. I have advised that the patient will be accommodated in the Main ED as soon as possible. I have also requested to contact the triage nurse or myself immediately if the patient experiences any changes in their condition during this brief waiting period.    Back pain with numbness, weakness into the right leg.  Reports perianal numbness.  Denies bowel incontinence.  + saddle anesthesia.  No urinary retention.  In a wheelchair due to pain and weakness.    LILLIANA Thomas Lindsay H, PA  01/24/25 2016

## 2025-01-25 NOTE — ED NOTES
ED SIGN OUT NOTE  Care assumed at [facility] 2:40 AM EST    Patient was signed out to me by Dr. Randall.     Patient is awaiting MRI, and final disposition.    /71   Pulse 95   Temp 98.1 °F (36.7 °C) (Oral)   Resp 19   Ht 1.575 m (5' 2\")   Wt 60.8 kg (134 lb 0.6 oz)   SpO2 99%   BMI 24.52 kg/m²     Labs Reviewed   CBC WITH AUTO DIFFERENTIAL - Abnormal; Notable for the following components:       Result Value    Eosinophils % 11.6 (*)     Basophils % 1.2 (*)     Eosinophils Absolute 0.56 (*)     All other components within normal limits   COMPREHENSIVE METABOLIC PANEL - Abnormal; Notable for the following components:    Sodium 132 (*)     Glucose 112 (*)     All other components within normal limits   URINALYSIS WITH MICROSCOPIC - Abnormal; Notable for the following components:    Appearance CLOUDY (*)     Leukocyte Esterase, Urine SMALL (*)     All other components within normal limits   URINE CULTURE HOLD SAMPLE   EXTRA TUBES HOLD     MRI LUMBAR SPINE W WO CONTRAST   Final Result   1. No acute abnormality.   2. Mild to moderate spinal canal stenosis and moderate to severe right neural   foraminal stenosis at L5-S1.   3. Remaining degenerative changes as detailed above, not significantly changed   since prior exam.   4. Stable mild chronic compression fractures of T12 and L4.   5. Cholelithiasis.         Electronically signed by Jacob Craven               Diagnosis:   1. Low back pain with right-sided sciatica, unspecified back pain laterality, unspecified chronicity    2. Saddle anesthesia        Disposition:   Decision To Discharge 01/25/2025 02:02:49 AM    Plan:   Patient was revealed at shift change.  Patient has not had any urinary/bowel incontinence or hesitancy. patient does have improvement of her right lower remedy weakness but did still have some saddle anesthesia located to the right inner thigh.  She has normal rectal tone.  MRI resulted that did show mild to moderate spinal canal

## 2025-01-25 NOTE — ED TRIAGE NOTES
Pt to ED w/ dtr in a WC w/ c/o of RLE pain for months which has worsened over the past few days to include entire lumbar region ,right leg and als \"numbness\" in the anal region and RLE.  Pt denies bowel or bladder incontinence.  Pt denies falls.

## 2025-01-25 NOTE — ED PROVIDER NOTES
EMERGENCY DEPARTMENT PHYSICIAN NOTE     Patient: Aundrea Simon     Time of Service: 1/24/2025  8:47 PM     Chief complaint:   Chief Complaint   Patient presents with    Leg Pain    Numbness        HISTORY:  Patient is a 84 y.o. female who presents to the emergency department with complaints of low back pain, right leg numbness and weakness, saddle anesthesia concerning for cauda equina.  Labs obtained and MRI ordered immediately.  Patient's vital signs are stable.  No difficulty urinating, no loss of bowel function.  Patient has long history of compression fractures and low back pain.  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 Henderson at [facility] [place]    Shingles 05/2019    Sun-damaged skin     Vertebral compression fracture (HCC) 01/05/12    Vocal cord anomaly     vocal cord dysfunction per speech therapist        Past Surgical History:   Procedure Laterality Date    BLD CARPEL TUNNEL RELEASE      CATARACT REMOVAL  07/2012    COLONOSCOPY N/A 7/7/2017    COLONOSCOPY performed by Momo Marie MD at [facility]    GYN      D&C    MOHS SURGERY  04/11/2017    BCC left cheek by Dr. Villalta    UPPER GASTROINTESTINAL ENDOSCOPY N/A 1/18/2024    EGD ESOPHAGOGASTRODUODENOSCOPY performed by Lori Lewis MD at [facility] ENDOSCOPY        Family History   Problem Relation Age of Onset    Cataracts Daughter         congenital cataracts    Cancer Father 60        lung    Heart Disease Mother     Heart Disease Daughter         fast heart rythmn        Social History     Socioeconomic History    Marital status:      Spouse name: None    Number of

## 2025-03-04 ENCOUNTER — HOSPITAL ENCOUNTER (EMERGENCY)
Facility: HOSPITAL | Age: 85
Discharge: HOME | End: 2025-03-04
Attending: STUDENT IN AN ORGANIZED HEALTH CARE EDUCATION/TRAINING PROGRAM
Payer: MEDICARE

## 2025-03-04 VITALS
HEART RATE: 68 BPM | RESPIRATION RATE: 16 BRPM | OXYGEN SATURATION: 100 % | TEMPERATURE: 97.7 F | WEIGHT: 138.89 LBS | DIASTOLIC BLOOD PRESSURE: 77 MMHG | SYSTOLIC BLOOD PRESSURE: 112 MMHG | BODY MASS INDEX: 25.4 KG/M2

## 2025-03-04 DIAGNOSIS — M79.604 RIGHT LEG PAIN: Primary | ICD-10-CM

## 2025-03-04 PROCEDURE — 96372 THER/PROPH/DIAG INJ SC/IM: CPT

## 2025-03-04 PROCEDURE — 99284 EMERGENCY DEPT VISIT MOD MDM: CPT

## 2025-03-04 PROCEDURE — 6370000000 HC RX 637 (ALT 250 FOR IP): Performed by: STUDENT IN AN ORGANIZED HEALTH CARE EDUCATION/TRAINING PROGRAM

## 2025-03-04 PROCEDURE — 6360000002 HC RX W HCPCS: Performed by: STUDENT IN AN ORGANIZED HEALTH CARE EDUCATION/TRAINING PROGRAM

## 2025-03-04 RX ORDER — KETOROLAC TROMETHAMINE 30 MG/ML
30 INJECTION, SOLUTION INTRAMUSCULAR; INTRAVENOUS ONCE
Status: COMPLETED | OUTPATIENT
Start: 2025-03-04 | End: 2025-03-04

## 2025-03-04 RX ORDER — OXYCODONE AND ACETAMINOPHEN 10; 325 MG/1; MG/1
1 TABLET ORAL
Status: COMPLETED | OUTPATIENT
Start: 2025-03-04 | End: 2025-03-04

## 2025-03-04 RX ORDER — LIDOCAINE 4 G/G
1 PATCH TOPICAL
Status: DISCONTINUED | OUTPATIENT
Start: 2025-03-04 | End: 2025-03-04 | Stop reason: HOSPADM

## 2025-03-04 RX ADMIN — KETOROLAC TROMETHAMINE 30 MG: 30 INJECTION, SOLUTION INTRAMUSCULAR at 09:31

## 2025-03-04 RX ADMIN — OXYCODONE AND ACETAMINOPHEN 1 TABLET: 10; 325 TABLET ORAL at 09:31

## 2025-03-04 ASSESSMENT — PAIN DESCRIPTION - LOCATION: LOCATION: BACK

## 2025-03-04 ASSESSMENT — ENCOUNTER SYMPTOMS
BACK PAIN: 1
ABDOMINAL PAIN: 0

## 2025-03-04 ASSESSMENT — PAIN DESCRIPTION - ORIENTATION: ORIENTATION: RIGHT

## 2025-03-04 ASSESSMENT — PAIN DESCRIPTION - DESCRIPTORS: DESCRIPTORS: OTHER (COMMENT)

## 2025-03-04 ASSESSMENT — PAIN SCALES - GENERAL: PAINLEVEL_OUTOF10: 10

## 2025-03-04 NOTE — ED PROVIDER NOTES
mis-transcribed.)    Shawn Pablo DO (electronically signed)  Emergency Attending Physician / Physician Assistant / Nurse Practitioner              Shawn Pablo DO  03/04/25 [id number]

## 2025-03-04 NOTE — ED TRIAGE NOTES
Pt stated she has had sciatica for a long time ( months) , right lower back pain radiating down her leg with numbness to her foot - not new, here for the same , getting better but progressively getting worse, denies incontinence, denies fever, no recent injury , took Tramadol at 0200 this am , has her follow up appt with ortho today

## 2025-03-04 NOTE — ED NOTES
Patient left ED in no acute distress, alert and oriented x4. Patient was encouraged to come back if symptoms get worse. Patient was provided with discharge instructions and prescriptions. All questions were answered. Patient left ambulatory.    Patient does not have an IV.   Subjective:      Cesar Jackson III is a 68 y.o. male who returns today regarding his     Pca   Active surveillance.    The following portions of the patient's history were reviewed and updated as appropriate: allergies, current medications, past family history, past medical history, past social history, past surgical history and problem list.    Review of Systems  Pertinent items are noted in HPI.  A comprehensive multipoint review of systems was negative except as otherwise stated in the HPI.    Past Medical History:   Diagnosis Date    Elevated PSA      Past Surgical History:   Procedure Laterality Date    bone spur Left     removal on shoulder    EPIDURAL BLOCK INJECTION  3/2016    cervical disc    HERNIA REPAIR Right     inguinal    KNEE ARTHROSCOPY W/ ACL RECONSTRUCTION Right     LYMPH NODE DISSECTION Left     axilla -      RIB FRACTURE SURGERY      TONSILLECTOMY         Review of patient's allergies indicates:  No Known Allergies       Objective:   Vitals: There were no vitals taken for this visit.    Physical Exam   General: alert and oriented, no acute distress  Respiratory: Symmetric expansion, non-labored breathing  Cardiovascular: no peripheral edema  Abdomen: soft, non distended  Skin: normal coloration and turgor, no rashes, no suspicious skin lesions noted  Neuro: no gross deficits  Psych: normal judgment and insight, normal mood/affect, and non-anxious  MICHELLE firm on right   45-50g    Physical Exam    Lab Review   Urinalysis demonstrates trace blood on dipstick otherwise neg  Micro ua neg    Lab Results   Component Value Date    WBC 4.08 07/21/2016    HGB 11.8 (L) 07/21/2016    HCT 35.8 (L) 07/21/2016    MCV 96 07/21/2016     (L) 07/21/2016     Lab Results   Component Value Date    CREATININE 0.8 02/15/2023    BUN 15 02/15/2023     Lab Results   Component Value Date    PSADIAG 1.5 02/15/2023 avodart 3.0    PSADIAG 1.5 08/18/2022    PSADIAG 1.8 02/15/2022    PSATOTAL 5.5 (H) 06/03/2021     PSATOTAL 5.0 (H) 12/03/2020    PSATOTAL 4.0 05/27/2020    PSAFREE 0.93 06/03/2021    PSAFREE 1.26 12/03/2020    PSAFREE 0.94 05/27/2020    PSAFREEPCT 16.91 06/03/2021    PSAFREEPCT 25.20 12/03/2020    PSAFREEPCT 23.50 05/27/2020     Final Pathologic Diagnosis 1. Prostate, target lesion 1, biopsy:   Benign prostatic tissue.   2. Prostate, target lesion 2, biopsy:   Prostatic adenocarcinoma, grade group 1, [place] score = 6 (3+3).   Carcinoma involves 1 of 2 tissue cores and 25% of total tissue volume.  VC      Comment: Interp By Surendra Browne M.D., Signed on 10/05/2021 at 16:42            Imaging  MRI PROSTATE W W/O CONTRAST     CLINICAL HISTORY:  elevated PSA;  Elevated prostate specific antigen (PSA)     TECHNIQUE:  Multiparametric MRI of the prostate/pelvis performed on a 3T scanner with phase pelvic coil. Multiplanar, multisequence images including high resolution, small field-of-view T2-WI; axial diffusion weighted images with multiple B-values and creation of ADC-maps; and dynamic contrast enhanced T1-weighted images through the prostate were obtained before, during, and after the administration of 10 cc intravenous gadolinium.     COMPARISON:  CT 07/18/2016     FINDINGS:  Previous biopsy: TRUS December 2016     PSA: 5.5 ng/mL 06/03/2021     Prior therapy: None     Prostate: 5.9 x 5.0 x 5.6 cm corresponding to a computed volume of 77 cc.     Peripheral zone: Two focal abnormalities with imaging features concerning for prostate cancer     Lesion (CLAUDE) #1     Location: Side: right; Region: mid; Zone: posterior peripheral zone laterally     Greatest dimension: 1.3 cm     T2-WI: T2 SI: circumscribed, homogeneous moderate hypointensity--score 4 or 5     DWI/ADC: DWI: Focal markedly hypointense on ADC and markedly hyperintense on high b-value DWI--score 4 or 5     DCE: Negative     Extraprostatic extension: Negative     PI-RADS assessment category: 4     Lesion (CLAUDE) #2     Location: Side: left; Region: mid; Zone:  posterior peripheral zone laterally     Greatest dimension: 1.1 cm     T2-WI: T2 SI: circumscribed, homogeneous moderate hypointensity--score 4 or 5     DWI/ADC: DWI: Focal mild/moderate hypointense on ADC and isointense/mild hyperintense on high b-value DWI--score 3     DCE: Negative     Extraprostatic extension: Negative     PI-RADS assessment category: 4     Transitional zone:     TZ abnormalities: No focal lesion concerning for prostate cancer     Neurovascular bundle: Normal     Seminal vesicles:     SV invasion: Negative     Adjacent Organ Involvement: No focal bladder wall thickening.  No rectal involvement.     Lymphadenopathy: None     Other Findings: None     Impression:     Overall Assessment:     PIRADS 4 (clinically significant cancer is likely to be present)     Number of targets created for potential MR/US fusion biopsy     Peripheral zone: 2     Transition zone: 0     Electronically signed by resident: Shaheen Trujillo  Date:                                            07/04/2021  Time:                                           09:17     Electronically signed by: Chintan Silverman Jr  Date:                                            07/04/2021  Time:                                           15:31         Imaging  -    Assessment and Plan:   Prostate cancer  Jaquan 6 mH4V3H8; psa 5.5; single focus  RTC 6 months with psa  Repeat MICHELLE    Enlarged prostate  Avodart  Flomax    Family history of prostate cancer  Offered genetic counseling referral

## 2025-03-04 NOTE — DISCHARGE INSTRUCTIONS
Please follow-up orthopedics today.  You have an appointment please do not miss this.  You can discuss pain management with them.  Return as needed.

## 2025-03-06 PROBLEM — M48.061 LUMBAR STENOSIS: Status: ACTIVE | Noted: 2025-03-06

## 2025-03-06 PROBLEM — M54.40 LUMBAGO WITH SCIATICA: Status: ACTIVE | Noted: 2025-03-06

## 2025-03-07 ENCOUNTER — HOSPITAL ENCOUNTER (OUTPATIENT)
Facility: HOSPITAL | Age: 85
Discharge: HOME | End: 2025-03-10
Payer: MEDICARE

## 2025-03-07 VITALS
BODY MASS INDEX: 25.03 KG/M2 | OXYGEN SATURATION: 99 % | HEART RATE: 63 BPM | WEIGHT: 136 LBS | TEMPERATURE: 98.3 F | SYSTOLIC BLOOD PRESSURE: 122 MMHG | DIASTOLIC BLOOD PRESSURE: 58 MMHG | HEIGHT: 62 IN

## 2025-03-07 LAB
ABO + RH BLD: NORMAL
ANION GAP SERPL CALC-SCNC: 4 MMOL/L (ref 2–12)
APPEARANCE UR: CLEAR
BACTERIA URNS QL MICRO: NEGATIVE /HPF
BILIRUB UR QL: NEGATIVE
BLOOD GROUP ANTIBODIES SERPL: NORMAL
BUN SERPL-MCNC: 11 MG/DL (ref 6–20)
BUN/CREAT SERPL: 15 (ref 12–20)
CALCIUM SERPL-MCNC: 10 MG/DL (ref 8.5–10.1)
CHLORIDE SERPL-SCNC: 99 MMOL/L (ref 97–108)
CO2 SERPL-SCNC: 34 MMOL/L (ref 21–32)
COLOR UR: NORMAL
CREAT SERPL-MCNC: 0.71 MG/DL (ref 0.55–1.02)
EPITH CASTS URNS QL MICRO: NORMAL /LPF
ERYTHROCYTE [DISTWIDTH] IN BLOOD BY AUTOMATED COUNT: 14.9 % (ref 11.5–14.5)
EST. AVERAGE GLUCOSE BLD GHB EST-MCNC: 103 MG/DL
GLUCOSE SERPL-MCNC: 91 MG/DL (ref 65–100)
GLUCOSE UR STRIP.AUTO-MCNC: NEGATIVE MG/DL
HBA1C MFR BLD: 5.2 % (ref 4–5.6)
HCT VFR BLD AUTO: 36.4 % (ref 35–47)
HGB BLD-MCNC: 11.8 G/DL (ref 11.5–16)
HGB UR QL STRIP: NEGATIVE
HYALINE CASTS URNS QL MICRO: NORMAL /LPF (ref 0–5)
INR PPP: 1 (ref 0.9–1.1)
KETONES UR QL STRIP.AUTO: NEGATIVE MG/DL
LEUKOCYTE ESTERASE UR QL STRIP.AUTO: NEGATIVE
MCH RBC QN AUTO: 30.7 PG (ref 26–34)
MCHC RBC AUTO-ENTMCNC: 32.4 G/DL (ref 30–36.5)
MCV RBC AUTO: 94.8 FL (ref 80–99)
NITRITE UR QL STRIP.AUTO: NEGATIVE
NRBC # BLD: 0 K/UL (ref 0–0.01)
NRBC BLD-RTO: 0 PER 100 WBC
PH UR STRIP: 8 (ref 5–8)
PLATELET # BLD AUTO: 233 K/UL (ref 150–400)
PMV BLD AUTO: 10.1 FL (ref 8.9–12.9)
POTASSIUM SERPL-SCNC: 4.6 MMOL/L (ref 3.5–5.1)
PROT UR STRIP-MCNC: NEGATIVE MG/DL
PROTHROMBIN TIME: 11.1 SEC (ref 9.2–11.2)
RBC # BLD AUTO: 3.84 M/UL (ref 3.8–5.2)
RBC #/AREA URNS HPF: NORMAL /HPF (ref 0–5)
SODIUM SERPL-SCNC: 137 MMOL/L (ref 136–145)
SP GR UR REFRACTOMETRY: 1 (ref 1–1.03)
SPECIMEN EXP DATE BLD: NORMAL
URINE CULTURE IF INDICATED: NORMAL
UROBILINOGEN UR QL STRIP.AUTO: 0.2 EU/DL (ref 0.2–1)
WBC # BLD AUTO: 4.3 K/UL (ref 3.6–11)
WBC URNS QL MICRO: NORMAL /HPF (ref 0–4)

## 2025-03-07 PROCEDURE — 85027 COMPLETE CBC AUTOMATED: CPT

## 2025-03-07 PROCEDURE — 85610 PROTHROMBIN TIME: CPT

## 2025-03-07 PROCEDURE — 83036 HEMOGLOBIN GLYCOSYLATED A1C: CPT

## 2025-03-07 PROCEDURE — 86900 BLOOD TYPING SEROLOGIC ABO: CPT

## 2025-03-07 PROCEDURE — 80048 BASIC METABOLIC PNL TOTAL CA: CPT

## 2025-03-07 PROCEDURE — 86850 RBC ANTIBODY SCREEN: CPT

## 2025-03-07 PROCEDURE — 86901 BLOOD TYPING SEROLOGIC RH(D): CPT

## 2025-03-07 PROCEDURE — 93005 ELECTROCARDIOGRAM TRACING: CPT | Performed by: ORTHOPAEDIC SURGERY

## 2025-03-07 PROCEDURE — 81001 URINALYSIS AUTO W/SCOPE: CPT

## 2025-03-07 RX ORDER — OMEGA-3 FATTY ACIDS/FISH OIL 300-1000MG
CAPSULE ORAL DAILY
COMMUNITY

## 2025-03-07 RX ORDER — POLYETHYLENE GLYCOL 3350 17 G/17G
17 POWDER, FOR SOLUTION ORAL DAILY PRN
COMMUNITY

## 2025-03-07 RX ORDER — METHYLPREDNISOLONE ACETATE 80 MG/ML
80 INJECTION, SUSPENSION INTRA-ARTICULAR; INTRALESIONAL; INTRAMUSCULAR; SOFT TISSUE AS NEEDED
COMMUNITY

## 2025-03-07 NOTE — PERIOP NOTE
[address] [address]   MAIN OR                     [phone]    MAIN PRE OP             [phone]                                                                                AMBULATORY PRE OP          [phone]  PRE-ADMISSION TESTING    [phone]     Surgery Date:  3/18/25       Is surgery arrival time given by surgeon?  YES  NO    If “NO”, [facility] staff will call you between 4 and 7pm the day before your surgery with your arrival time. (If your surgery is on a Monday, we will call you the Friday before.)    Call [phone] after 7pm Monday-Friday if you did not receive this call.    INSTRUCTIONS BEFORE YOUR SURGERY   When You  Arrive Arrive at [facility] Patient Access on 1st floor the day of your surgery.  Have your insurance card, photo ID,living will/advanced directive/POA (if applicable),  and any copayment (if needed)   Food   and   Drink NO solid food after midnight the night before surgery. You can drink clear liquids from midnight until ONE hour prior to your arrival at the hospital on the day of your surgery. Clear liquids include:  Water  Apple juice (no sediment)  Carbonated beverages  Black coffee(no cream/milk)  Tea(no cream/milk)  Gatorade    No alcohol (beer, wine, liquor) or marijuana (smoking) 24 hours, edibles (3 days). Stop smoking cigarettes 14 days before surgery (helps w/healing and breathing).   Medications to   TAKE   Morning of Surgery MEDICATIONS TO TAKE THE MORNING OF SURGERY WITH A SIP OF WATER: LYRICA AND PEPCID   EYE DROPS     You may take these medications, IF NEEDED, the morning of surgery: HYDROCODONE -ACETAMINOPHEN 4 HOURS PRIOR TO ARRIVAL IF NEEDED     Ask your surgeon/prescribing doctor for instructions on taking or stopping these medications prior to surgery:    Medications to STOP  before surgery Non-Steroidal anti-inflammatory Drugs (NSAID's): for example, Diclofenac(Voltaren),   need to use nasal spray, clean the tip of the bottle with alcohol before use and do not use both at the same time.  If you are scheduled for COVID testing during the 5 days, do NOT apply morning dose until after the COVID test has been performed.        Follow all instructions so your surgery won’t be cancelled.  Please, be on time.                    If a situation occurs and you are delayed the day of surgery, call [phone]/ [phone].    If your physical condition changes (like a fever, cold, flu, etc.) call your surgeon as soon as possible.    Home medication(s) reviewed and verified via during PAT appointment/call.    The patient was contacted in person.     She verbalized understanding of all instructions does not need reinforcement.

## 2025-03-08 LAB
BACTERIA SPEC CULT: NORMAL
BACTERIA SPEC CULT: NORMAL
EKG ATRIAL RATE: 58 BPM
EKG DIAGNOSIS: NORMAL
EKG P AXIS: 59 DEGREES
EKG P-R INTERVAL: 158 MS
EKG Q-T INTERVAL: 442 MS
EKG QRS DURATION: 124 MS
EKG QTC CALCULATION (BAZETT): 433 MS
EKG R AXIS: 57 DEGREES
EKG T AXIS: 43 DEGREES
EKG VENTRICULAR RATE: 58 BPM
SERVICE CMNT-IMP: NORMAL

## 2025-03-08 PROCEDURE — 93010 ELECTROCARDIOGRAM REPORT: CPT | Performed by: INTERNAL MEDICINE

## 2025-03-13 ENCOUNTER — TELEPHONE (OUTPATIENT)
Age: 85
End: 2025-03-13

## 2025-03-13 NOTE — TELEPHONE ENCOUNTER
Patient called in to schedule to have surgery S1 POSTERIOR LUMBAR LAMINECTOMY on 03/18/25. Patient stated that she can't continue with this date unless she has cardiac clearance. Patient is requesting to please have an urgent call back to give more information .       Patient #~ [phone]

## 2025-03-14 ENCOUNTER — TELEPHONE (OUTPATIENT)
Age: 85
End: 2025-03-14

## 2025-03-14 NOTE — TELEPHONE ENCOUNTER
Patient said she received a message from the nurse on her portal.    Patient said she need clearance in order to have surgery.Patient is having back surgery on 3/18/25.    Patient said that her PCP will not give her cardiac clearance until she hears from  saying that she is clear.      [facility]     [phone] office    [phone] patient

## 2025-03-14 NOTE — TELEPHONE ENCOUNTER
Patient said she received a message from the nurse on her portal.     Patient said she need clearance in order to have surgery.Patient is having back surgery on 3/18/25.     Patient said that her PCP will not give her cardiac clearance until she hears from  saying that she is clear.        [facility]     [phone] office     [phone] patient

## 2025-03-14 NOTE — TELEPHONE ENCOUNTER
Cardiac Clearance    [facility]         S1 Posterior Lumbar Laminectomy    Date of Surgery 03/18/2025

## 2025-03-17 NOTE — TELEPHONE ENCOUNTER
I have not seen this patient for 2 and a half years. When I last saw her, she did not have any worrisome cardiac symptoms. If that is still the case, she should be fine for surgery without special precautions.

## 2025-03-17 NOTE — TELEPHONE ENCOUNTER
Faxed notes to [facility] regarding pt requesting Cardiac Clearance ,Faxed confirmation of receipt received.

## 2025-04-07 ENCOUNTER — OFFICE VISIT (OUTPATIENT)
Age: 85
End: 2025-04-07
Payer: MEDICARE

## 2025-04-07 VITALS
WEIGHT: 134 LBS | HEART RATE: 72 BPM | DIASTOLIC BLOOD PRESSURE: 68 MMHG | OXYGEN SATURATION: 99 % | HEIGHT: 62 IN | BODY MASS INDEX: 24.66 KG/M2 | SYSTOLIC BLOOD PRESSURE: 108 MMHG

## 2025-04-07 DIAGNOSIS — R94.31 ABNORMAL EKG: ICD-10-CM

## 2025-04-07 DIAGNOSIS — R06.02 SHORTNESS OF BREATH: ICD-10-CM

## 2025-04-07 DIAGNOSIS — J38.3 VOCAL CORD DYSFUNCTION: ICD-10-CM

## 2025-04-07 DIAGNOSIS — Z76.89 ESTABLISHING CARE WITH NEW DOCTOR, ENCOUNTER FOR: ICD-10-CM

## 2025-04-07 DIAGNOSIS — R06.09 DOE (DYSPNEA ON EXERTION): Primary | ICD-10-CM

## 2025-04-07 DIAGNOSIS — R94.31 ABNORMAL ELECTROCARDIOGRAPHY: ICD-10-CM

## 2025-04-07 DIAGNOSIS — M26.79 TORI PRESENT ON RESIDUAL ALVEOLAR RIDGE OF MAXILLA: ICD-10-CM

## 2025-04-07 DIAGNOSIS — Z86.79 H/O PERICARDITIS: ICD-10-CM

## 2025-04-07 DIAGNOSIS — I45.10 RBBB: ICD-10-CM

## 2025-04-07 DIAGNOSIS — Z01.818 PRE-OPERATIVE CLEARANCE: ICD-10-CM

## 2025-04-07 DIAGNOSIS — L12.1 OCULAR PEMPHIGOID: ICD-10-CM

## 2025-04-07 PROCEDURE — 93010 ELECTROCARDIOGRAM REPORT: CPT | Performed by: INTERNAL MEDICINE

## 2025-04-07 PROCEDURE — 93005 ELECTROCARDIOGRAM TRACING: CPT | Performed by: INTERNAL MEDICINE

## 2025-04-07 PROCEDURE — 1126F AMNT PAIN NOTED NONE PRSNT: CPT | Performed by: INTERNAL MEDICINE

## 2025-04-07 PROCEDURE — 1160F RVW MEDS BY RX/DR IN RCRD: CPT | Performed by: INTERNAL MEDICINE

## 2025-04-07 PROCEDURE — 99204 OFFICE O/P NEW MOD 45 MIN: CPT | Performed by: INTERNAL MEDICINE

## 2025-04-07 PROCEDURE — G8400 PT W/DXA NO RESULTS DOC: HCPCS | Performed by: INTERNAL MEDICINE

## 2025-04-07 PROCEDURE — 1123F ACP DISCUSS/DSCN MKR DOCD: CPT | Performed by: INTERNAL MEDICINE

## 2025-04-07 PROCEDURE — 1159F MED LIST DOCD IN RCRD: CPT | Performed by: INTERNAL MEDICINE

## 2025-04-07 PROCEDURE — G8420 CALC BMI NORM PARAMETERS: HCPCS | Performed by: INTERNAL MEDICINE

## 2025-04-07 PROCEDURE — 1090F PRES/ABSN URINE INCON ASSESS: CPT | Performed by: INTERNAL MEDICINE

## 2025-04-07 PROCEDURE — G8427 DOCREV CUR MEDS BY ELIG CLIN: HCPCS | Performed by: INTERNAL MEDICINE

## 2025-04-07 PROCEDURE — 1036F TOBACCO NON-USER: CPT | Performed by: INTERNAL MEDICINE

## 2025-04-07 ASSESSMENT — PATIENT HEALTH QUESTIONNAIRE - PHQ9
SUM OF ALL RESPONSES TO PHQ QUESTIONS 1-9: 0
1. LITTLE INTEREST OR PLEASURE IN DOING THINGS: NOT AT ALL
SUM OF ALL RESPONSES TO PHQ QUESTIONS 1-9: 0
SUM OF ALL RESPONSES TO PHQ QUESTIONS 1-9: 0
2. FEELING DOWN, DEPRESSED OR HOPELESS: NOT AT ALL
SUM OF ALL RESPONSES TO PHQ QUESTIONS 1-9: 0

## 2025-04-07 NOTE — PROGRESS NOTES
1. Have you been to the ER, urgent care clinic since your last visit?  Hospitalized since your last visit?No    2. Have you seen or consulted any other health care providers outside of the [facility] since your last visit?  Include any pap smears or colon screening. No    
traMADol (ULTRAM) 50 MG tablet Take 1-2 tablets by mouth 2 times daily as needed for Pain for up to 180 days. Max Daily Amount: 200 mg    polyethylene glycol (GLYCOLAX) 17 g packet Take 1 packet by mouth daily as needed for Constipation    Denosumab (PROLIA SC) Inject into the skin every 6 months    methylPREDNISolone acetate (DEPO-MEDROL) 80 MG/ML injection Inject 1 mL into the muscle as needed    Calcium-Phosphorus-Vitamin D (CALCIUM GUMMIES PO) Take by mouth    Omega 3 1000 MG CAPS Take by mouth daily    NONFORMULARY daily AUTOLOGOUS 50% SERUM TEARS  1 DROP BOTH EYES    ondansetron (ZOFRAN-ODT) 4 MG disintegrating tablet Take 1 tablet by mouth every 6 hours as needed for Nausea or Vomiting    Methotrexate, Anti-Rheumatic, (METHOTREXATE SC) Inject into the skin Once a week at 5 PM    Ferrous Sulfate (SLOW FE PO) Take by mouth daily    famotidine (PEPCID) 40 MG tablet Take 1 tablet by mouth daily    Multiple Vitamin (MULTIVITAMIN ADULT PO) Take by mouth daily    Glycerin-Polysorbate 80 (REFRESH DRY EYE THERAPY OP) Apply to eye 4 vials 3 times nightly    CALCIUM PO Take by mouth    Cholecalciferol 50 MCG (2000 UT) TABS Take 1 tablet by mouth daily    docusate (COLACE, DULCOLAX) 100 MG CAPS Take by mouth 2 times daily as needed    pregabalin (LYRICA) 50 MG capsule Take 1 capsule by mouth 2 times daily for 24 days. Supervising Physician: Roman Santiago MD NPI: [id number] MICHELLE: [id number] Max Daily Amount: 100 mg     No current facility-administered medications for this visit.           Review of Symptoms:  11 systems reviewed, negative other than as stated in the HPI    Physical ExamPhysical Exam:    Vitals:    04/07/25 1316   BP: 108/68   BP Site: Right Upper Arm   Patient Position: Sitting   BP Cuff Size: Medium Adult   Pulse: 72   SpO2: 99%   Weight: 60.8 kg (134 lb)   Height: 1.575 m (5' 2\")     Body mass index is 24.51 kg/m².    General PE   General:  Well developed, in no acute distress, cooperative and

## 2025-04-07 NOTE — PATIENT INSTRUCTIONS
You will be scheduled for a Nuclear Stress Test after your appointment today.    Nuclear stress testing evaluates blood flow to your heart muscle and assesses cardiac function. There are 2 parts (Rest/Stress) to this procedure and will include an exercise on a treadmill.  *Please arrive 15 minutes prior to your appointment time    Test Duration:    -One day testing will take 4 hours     Day of testing instructions:    NO CAFFEINE (not even decaffeinated products) 24 HOURS PRIOR TO TESTING. This includes coffee, soda, tea, chocolate, multivitamins, and migraine medication, like Excedrin or Fioricet that contains caffeine.  Nothing to eat or drink 4 HOURS prior to testing  NO NICOTINE 12 hours prior to testing  Hold any medications requested by your cardiologist. Otherwise take medications as directed with a few sips of water. If you are unsure you may bring your medications with you to take after instructed by your stressing nurse. It is recommended you hold NONE prior to your test. DIABETIC PATIENTS: Take half of your insulin with a light meal 4 hours before your test.  Wear comfortable clothes and shoes (Shirts with no metal, shorts or pants, tennis shoes, no heels or flip flops)    IMPORTANT: This testing involves a cardiac tracer ordered specifically for you. If you are unable to make your appointment, please call to cancel/reschedule AT LEAST 24 hours prior to your appointment so your tracer can be cancelled. [phone].

## 2025-04-10 ENCOUNTER — PATIENT MESSAGE (OUTPATIENT)
Age: 85
End: 2025-04-10

## 2025-04-10 NOTE — TELEPHONE ENCOUNTER
Attempted to contact patient unsucessfully.  Noted that Dr. Diggs is requesting Nuclear Stress test and Echo prior to any procedure/surgery. No clearance sent to PCP.  Per dr. Diggs's notes on 04-:  \"ROSARIO, preoperative cardiovascular risk assessment for lumbar spinal surgery with Dr. Lopes, abnormal EKG with right bundle branch block:  -Check Lexiscan Cardiolite and echo-if no cardiac issues, ROSARIO is likely attributable to vocal cord dysfunction as suspected by the patient   History of pericarditis 2020, previously seen by Dr. Tico weiner:  -Patient has read that this can make issues with surgery like intubation etc. more difficult and I advised her to notify anesthesia prior to surgery   If low risk cardiac testing and no new concerning symptoms, she can follow-up as needed and will be at low cardiac risk to proceed with the procedure.\"  A My Chart message sent to patient with above information.

## 2025-04-11 ENCOUNTER — TELEPHONE (OUTPATIENT)
Age: 85
End: 2025-04-11

## 2025-04-11 NOTE — TELEPHONE ENCOUNTER
Radha-I am responding to the patient message from Ms. Simon:    \"Dear Dr. Diggs:     It was a pleasure meeting you last Monday (4/7/2025). Unfortunately, our time was somewhat limited and I left the appointment not knowing why you had referred me for a nuclear stress test and an echo complete adult (TTE).     Are  these tests simply protocol for a new patient or were they ordered because there was something in our discussion  or the EKG that triggered a need for them?     Initially, I surmised the tests were for Surgery Clearance  with general anesthesia, but after learning that my PCP Coby Batista received Clearance from you yesterday (BEFORE I take the tests), it appears my thinking was in error.     As you can see, I could  benefit from any light you could shine on my confusion. Thank you for your care on Monday and for your attention to my current concerns.\"       Also later I noted she had questions about the abbreviation ROSARIO which means dyspnea on exertion and the significance of right bundle branch block.    Please explain that I see that Dr. Doshi put a note in on 3/17/2025 which said the following:      I have not seen this patient for 2 and a half years. When I last saw her, she did not have any worrisome cardiac symptoms. If that is still the case, she should be fine for surgery without special precautions.        This may be why there seems to have been a clearance given prior to my office visit.    Once I saw her and she said she had shortness of breath on it with exertion that she attributes to her vocal cord dysfunction I said what we best make sure with our cardiac testing that the dyspnea on exertion is not due to a blockage in the arteries of the heart for a new weak heart or valve problem and that is why I ordered the stress test and the echo.  I had not seen Dr. Doshi's note prior to that.    Regarding right bundle branch block.  Right bundle branch block is a very common and most often

## 2025-04-13 ENCOUNTER — APPOINTMENT (OUTPATIENT)
Facility: HOSPITAL | Age: 85
DRG: 092 | End: 2025-04-13
Payer: MEDICARE

## 2025-04-13 ENCOUNTER — HOSPITAL ENCOUNTER (INPATIENT)
Facility: HOSPITAL | Age: 85
LOS: 1 days | Discharge: LEFT BEFORE DISCHARGE | DRG: 092 | End: 2025-04-13
Attending: STUDENT IN AN ORGANIZED HEALTH CARE EDUCATION/TRAINING PROGRAM | Admitting: INTERNAL MEDICINE
Payer: MEDICARE

## 2025-04-13 VITALS
DIASTOLIC BLOOD PRESSURE: 67 MMHG | SYSTOLIC BLOOD PRESSURE: 100 MMHG | WEIGHT: 141.09 LBS | OXYGEN SATURATION: 100 % | RESPIRATION RATE: 16 BRPM | HEART RATE: 76 BPM | HEIGHT: 62 IN | TEMPERATURE: 98.2 F | BODY MASS INDEX: 25.96 KG/M2

## 2025-04-13 DIAGNOSIS — I63.9 ACUTE CVA (CEREBROVASCULAR ACCIDENT) (HCC): ICD-10-CM

## 2025-04-13 DIAGNOSIS — R44.9 RIGHT-SIDED SENSORY DEFICIT PRESENT: Primary | ICD-10-CM

## 2025-04-13 LAB
ALBUMIN SERPL-MCNC: 4 G/DL (ref 3.5–5)
ALBUMIN/GLOB SERPL: 1.2 (ref 1.1–2.2)
ALP SERPL-CCNC: 64 U/L (ref 45–117)
ALT SERPL-CCNC: 20 U/L (ref 12–78)
ANION GAP SERPL CALC-SCNC: 3 MMOL/L (ref 2–12)
AST SERPL-CCNC: 15 U/L (ref 15–37)
BASOPHILS # BLD: 0.05 K/UL (ref 0–0.1)
BASOPHILS NFR BLD: 0.8 % (ref 0–1)
BILIRUB SERPL-MCNC: 0.2 MG/DL (ref 0.2–1)
BUN SERPL-MCNC: 14 MG/DL (ref 6–20)
BUN/CREAT SERPL: 18 (ref 12–20)
CALCIUM SERPL-MCNC: 9.9 MG/DL (ref 8.5–10.1)
CHLORIDE SERPL-SCNC: 97 MMOL/L (ref 97–108)
CO2 SERPL-SCNC: 32 MMOL/L (ref 21–32)
COMMENT:: NORMAL
CREAT SERPL-MCNC: 0.8 MG/DL (ref 0.55–1.02)
DIFFERENTIAL METHOD BLD: ABNORMAL
EKG ATRIAL RATE: 71 BPM
EKG DIAGNOSIS: NORMAL
EKG P AXIS: 63 DEGREES
EKG P-R INTERVAL: 168 MS
EKG Q-T INTERVAL: 416 MS
EKG QRS DURATION: 118 MS
EKG QTC CALCULATION (BAZETT): 452 MS
EKG R AXIS: 44 DEGREES
EKG T AXIS: 32 DEGREES
EKG VENTRICULAR RATE: 71 BPM
EOSINOPHIL # BLD: 0.55 K/UL (ref 0–0.4)
EOSINOPHIL NFR BLD: 9.1 % (ref 0–7)
ERYTHROCYTE [DISTWIDTH] IN BLOOD BY AUTOMATED COUNT: 14 % (ref 11.5–14.5)
GLOBULIN SER CALC-MCNC: 3.4 G/DL (ref 2–4)
GLUCOSE BLD STRIP.AUTO-MCNC: 116 MG/DL (ref 65–117)
GLUCOSE SERPL-MCNC: 98 MG/DL (ref 65–100)
HCT VFR BLD AUTO: 38.5 % (ref 35–47)
HGB BLD-MCNC: 12.8 G/DL (ref 11.5–16)
IMM GRANULOCYTES # BLD AUTO: 0.01 K/UL (ref 0–0.04)
IMM GRANULOCYTES NFR BLD AUTO: 0.2 % (ref 0–0.5)
INR PPP: 1 (ref 0.9–1.1)
LYMPHOCYTES # BLD: 1.65 K/UL (ref 0.8–3.5)
LYMPHOCYTES NFR BLD: 27.3 % (ref 12–49)
MCH RBC QN AUTO: 30.7 PG (ref 26–34)
MCHC RBC AUTO-ENTMCNC: 33.2 G/DL (ref 30–36.5)
MCV RBC AUTO: 92.3 FL (ref 80–99)
MONOCYTES # BLD: 0.53 K/UL (ref 0–1)
MONOCYTES NFR BLD: 8.8 % (ref 5–13)
NEUTS SEG # BLD: 3.26 K/UL (ref 1.8–8)
NEUTS SEG NFR BLD: 53.8 % (ref 32–75)
NRBC # BLD: 0 K/UL (ref 0–0.01)
NRBC BLD-RTO: 0 PER 100 WBC
PLATELET # BLD AUTO: 204 K/UL (ref 150–400)
PMV BLD AUTO: 9.7 FL (ref 8.9–12.9)
POTASSIUM SERPL-SCNC: 3.7 MMOL/L (ref 3.5–5.1)
PROT SERPL-MCNC: 7.4 G/DL (ref 6.4–8.2)
PROTHROMBIN TIME: 11 SEC (ref 9.2–11.2)
RBC # BLD AUTO: 4.17 M/UL (ref 3.8–5.2)
SERVICE CMNT-IMP: NORMAL
SODIUM SERPL-SCNC: 132 MMOL/L (ref 136–145)
SPECIMEN HOLD: NORMAL
TROPONIN I SERPL HS-MCNC: 6 NG/L (ref 0–51)
WBC # BLD AUTO: 6.1 K/UL (ref 3.6–11)

## 2025-04-13 PROCEDURE — 82962 GLUCOSE BLOOD TEST: CPT

## 2025-04-13 PROCEDURE — 80053 COMPREHEN METABOLIC PANEL: CPT

## 2025-04-13 PROCEDURE — 93005 ELECTROCARDIOGRAM TRACING: CPT | Performed by: STUDENT IN AN ORGANIZED HEALTH CARE EDUCATION/TRAINING PROGRAM

## 2025-04-13 PROCEDURE — 4A03X5D MEASUREMENT OF ARTERIAL FLOW, INTRACRANIAL, EXTERNAL APPROACH: ICD-10-PCS | Performed by: INTERNAL MEDICINE

## 2025-04-13 PROCEDURE — 70551 MRI BRAIN STEM W/O DYE: CPT

## 2025-04-13 PROCEDURE — 2060000000 HC ICU INTERMEDIATE R&B

## 2025-04-13 PROCEDURE — 85610 PROTHROMBIN TIME: CPT

## 2025-04-13 PROCEDURE — 85025 COMPLETE CBC W/AUTO DIFF WBC: CPT

## 2025-04-13 PROCEDURE — 99285 EMERGENCY DEPT VISIT HI MDM: CPT

## 2025-04-13 PROCEDURE — 6360000004 HC RX CONTRAST MEDICATION: Performed by: RADIOLOGY

## 2025-04-13 PROCEDURE — 99291 CRITICAL CARE FIRST HOUR: CPT | Performed by: NURSE PRACTITIONER

## 2025-04-13 PROCEDURE — 70450 CT HEAD/BRAIN W/O DYE: CPT

## 2025-04-13 PROCEDURE — 0042T CT BRAIN PERFUSION: CPT

## 2025-04-13 PROCEDURE — 84484 ASSAY OF TROPONIN QUANT: CPT

## 2025-04-13 PROCEDURE — 70498 CT ANGIOGRAPHY NECK: CPT

## 2025-04-13 RX ORDER — SODIUM CHLORIDE 9 MG/ML
INJECTION, SOLUTION INTRAVENOUS CONTINUOUS
Status: CANCELLED | OUTPATIENT
Start: 2025-04-13

## 2025-04-13 RX ORDER — FAMOTIDINE 20 MG/1
40 TABLET, FILM COATED ORAL DAILY
Status: CANCELLED | OUTPATIENT
Start: 2025-04-13

## 2025-04-13 RX ORDER — ENOXAPARIN SODIUM 100 MG/ML
40 INJECTION SUBCUTANEOUS DAILY
Status: CANCELLED | OUTPATIENT
Start: 2025-04-13

## 2025-04-13 RX ORDER — POLYETHYLENE GLYCOL 3350 17 G/17G
17 POWDER, FOR SOLUTION ORAL DAILY PRN
Status: CANCELLED | OUTPATIENT
Start: 2025-04-13

## 2025-04-13 RX ORDER — SODIUM CHLORIDE 0.9 % (FLUSH) 0.9 %
5-40 SYRINGE (ML) INJECTION EVERY 12 HOURS SCHEDULED
Status: CANCELLED | OUTPATIENT
Start: 2025-04-13

## 2025-04-13 RX ORDER — SODIUM CHLORIDE 9 MG/ML
INJECTION, SOLUTION INTRAVENOUS PRN
Status: CANCELLED | OUTPATIENT
Start: 2025-04-13

## 2025-04-13 RX ORDER — ATORVASTATIN CALCIUM 40 MG/1
80 TABLET, FILM COATED ORAL NIGHTLY
Status: CANCELLED | OUTPATIENT
Start: 2025-04-13

## 2025-04-13 RX ORDER — SODIUM CHLORIDE 0.9 % (FLUSH) 0.9 %
5-40 SYRINGE (ML) INJECTION PRN
Status: CANCELLED | OUTPATIENT
Start: 2025-04-13

## 2025-04-13 RX ORDER — ONDANSETRON 2 MG/ML
4 INJECTION INTRAMUSCULAR; INTRAVENOUS EVERY 6 HOURS PRN
Status: CANCELLED | OUTPATIENT
Start: 2025-04-13

## 2025-04-13 RX ORDER — IOPAMIDOL 755 MG/ML
100 INJECTION, SOLUTION INTRAVASCULAR
Status: COMPLETED | OUTPATIENT
Start: 2025-04-13 | End: 2025-04-13

## 2025-04-13 RX ORDER — CLOPIDOGREL BISULFATE 75 MG/1
75 TABLET ORAL DAILY
Status: CANCELLED | OUTPATIENT
Start: 2025-04-13

## 2025-04-13 RX ORDER — ONDANSETRON 4 MG/1
4 TABLET, ORALLY DISINTEGRATING ORAL EVERY 8 HOURS PRN
Status: CANCELLED | OUTPATIENT
Start: 2025-04-13

## 2025-04-13 RX ORDER — TRAMADOL HYDROCHLORIDE 50 MG/1
50 TABLET ORAL 2 TIMES DAILY PRN
Status: CANCELLED | OUTPATIENT
Start: 2025-04-13

## 2025-04-13 RX ADMIN — IOPAMIDOL 40 ML: 755 INJECTION, SOLUTION INTRAVENOUS at 00:47

## 2025-04-13 RX ADMIN — IOPAMIDOL 80 ML: 755 INJECTION, SOLUTION INTRAVENOUS at 00:47

## 2025-04-13 ASSESSMENT — PAIN - FUNCTIONAL ASSESSMENT
PAIN_FUNCTIONAL_ASSESSMENT: NONE - DENIES PAIN

## 2025-04-13 NOTE — PROGRESS NOTES
Code Stroke Documentation      Symptoms: She recently had a spinal epidural 2-3 days ago for sciatica. The patient presented with right leg numbness radiating to the right arm, throat, and face. She mentioned having experienced a similar episode in the past, but without the facial and throat numbness, only affecting the leg. An MRI of the L-spine was obtained for similar symptoms, including right-sided numbness, pain, weakness, and perineal numbness. The MRI findings show mild to moderate spinal canal stenosis and moderate to severe right neural foraminal stenosis at L5-S1, along with stable mild chronic compression fractures of T12 and L4   Baseline mRS:   0   Last Known Well:    Medical hx: Past Medical History:   Diagnosis Date    Arthritis     Basal cell carcinoma     LEFT CHEEK AND RIGHT CHIN    Diverticulitis     Dystrophy, cornea     Environmental allergies     Family history of skin cancer     GERD (gastroesophageal reflux disease)     Hypotension     Ill-defined condition     Hyponatremia    Menopause     LMP-unknown    Multiple drug allergies     OCP (ocular cicatricial pemphigoid) 2024    Osteoporosis     Dr. [facility] at [facility]    Sun-damaged skin     Vertebral compression fracture (HCC) 2012    Vocal cord anomaly     vocal cord dysfunction per speech therapist      Vitals: Vitals:    25 0018   BP: 128/81   Pulse: 98   Resp: 17   Temp: 97.7 °F (36.5 °C)   SpO2: 99%      AC/APT:  No   VAN: Negative   NIHSS: 1a-LOC: 0  1b-Month/Age: 0  1c-Open/Close Hand: 0  2-Best Gaze: 0  3-Visual Fields: 0  4-Facial Palsy: 0  5a-Left Arm: 0  5b-Right Arm: 0  6a-Left Le  6b-Right Le  7-Limb Ataxia: 0  8-Sensory: 2  9-Best Language: 0  10-Dysarthria: 0  11-Extinction/Inattention: 0  TOTAL SCORE: 2   Imaging (personally reviewed): CT: No acute intracranial process. Small amount of fluid in the right maxillary sinus may indicate sinusitis    CTA: No acute thrombosis or

## 2025-04-13 NOTE — ED TRIAGE NOTES
Pt walks into the ED with CC of numbness and decrease sensation on right side. Pt states she noticed the pt slows started to occurred at 2200 tonight. The pt's numbness started on her foot and then went up all the way to the top of her head. Pt denies HA nor dizziness. Pt does not having any slurred speech nor confusion.  No blood thinners, no stroke, nor MI history. Pt did have an epidural for sciatica 3 days ago.

## 2025-04-13 NOTE — ED PROVIDER NOTES
HISTORY OF PRESENT ILLNESS:    An 84-year-old female with a past medical history significant for GERD, diverticulitis, hypertension, arthritis, and basal cell carcinoma presented to the emergency department with right-sided sensory deficits. The history was provided by the patient. She reports progressive numbness starting from the heel of the right foot and extending to the right side of the body, including the arm and face. The symptoms had a sudden onset, with the last well-known time being approximately 10 PM. She denies headaches, dizziness, visual changes, speech difficulties, or weakness. Notably, she had a recent epidural spinal injection 3 days ago for chronic back pain due to a vertebral compression fracture.    PAST MEDICAL HISTORY:   - GERD  - Diverticulitis  - Hypertension  - Arthritis  - Basal cell carcinoma    PHYSICAL EXAM:  Vitals: Interpreted as normal for this patient.  General: NAD. Well-kept female adult.  Eyes: Appear normal with no scleral icterus.  HENT: Atraumatic. Moist mucous membranes, no pharyngeal erythema, edema or lesions.  Neck: Atraumatic, supple.    Cardiac: Regular rate, regular rhythm, no significant murmurs appreciated.   Respiratory: No respiratory distress, clear lungs bilaterally with no abnormal breath sounds.  Abdomen: Soft. Nontender. Nondistended. No rebound. No guarding. No tenderness over McBurney's point, no tenderness over the liver or spleen, no Sheehan's sign, no pulsatile abdominal mass.  : No CVAT.  MS: Extremities atraumatic. No edema, no calf tenderness to palpation.   Skin: No exanthems, no cyanosis, no diaphoresis.  Back exam: Atraumatic.  Neurologic: Sensory deficits noted in the right upper extremity, right lower extremity, and right side of the face. No altered mental status, speech is fluent. Gait is within normal limits. No cerebellar deficits. No motor deficits.  Psychological: Cooperative and participatory with examination.    SUMMARY:  The patient, an

## 2025-04-13 NOTE — ED NOTES
Bedside shift change report given to Kayley RN/Ayaan RN (oncoming nurse) by Bakari RN (offgoing nurse). Report included the following information Nurse Handoff Report, Index, ED Encounter Summary, ED SBAR, Adult Overview, Intake/Output, and MAR.

## 2025-04-13 NOTE — ED NOTES
This RN assisted patient to use bedside commode. Patient clothes changed to hospital gown. Fall risks activated by applying fall risks band on the right wrist, lowering the bed with side rails up and placed anti slippery socks.

## 2025-04-13 NOTE — ED NOTES
1535 - MD at [facility] to discuss leaving AMA with patient and family member present. Patient and family member aware of risks versus benefits explained by MD. Patient allotted time to read AMA paperwork and opportunity for questions provided.     1541 - AMA paperwork signed by MD, patient, and this RN.     1545 - Patient ambulatory with no difficulty and steady gait upon exiting ER with family member.

## 2025-04-14 NOTE — PROGRESS NOTES
Patient was referred to the hospitalist service for admission for stroke workup.  Patient decided to leave AGAINST MEDICAL ADVICE.  Risks of leaving AGAINST MEDICAL ADVICE which include but not limited to worsening stroke symptoms and death explained to the patient and her daughter at bedside.

## 2025-04-21 PROBLEM — B02.9 HERPES ZOSTER WITHOUT COMPLICATION: Status: RESOLVED | Noted: 2019-05-15 | Resolved: 2025-04-21

## 2025-04-21 PROBLEM — I63.9 ACUTE CVA (CEREBROVASCULAR ACCIDENT) (HCC): Status: RESOLVED | Noted: 2025-04-13 | Resolved: 2025-04-21

## 2025-05-12 ENCOUNTER — PATIENT MESSAGE (OUTPATIENT)
Age: 85
End: 2025-05-12

## 2025-05-16 ENCOUNTER — ANCILLARY PROCEDURE (OUTPATIENT)
Age: 85
End: 2025-05-16
Payer: MEDICARE

## 2025-05-16 VITALS
WEIGHT: 134 LBS | DIASTOLIC BLOOD PRESSURE: 70 MMHG | HEIGHT: 62 IN | SYSTOLIC BLOOD PRESSURE: 120 MMHG | BODY MASS INDEX: 24.66 KG/M2 | HEART RATE: 71 BPM

## 2025-05-16 VITALS
WEIGHT: 134 LBS | SYSTOLIC BLOOD PRESSURE: 120 MMHG | DIASTOLIC BLOOD PRESSURE: 70 MMHG | BODY MASS INDEX: 24.66 KG/M2 | HEIGHT: 62 IN | HEART RATE: 71 BPM

## 2025-05-16 DIAGNOSIS — R94.31 ABNORMAL ELECTROCARDIOGRAPHY: ICD-10-CM

## 2025-05-16 DIAGNOSIS — R06.02 SHORTNESS OF BREATH: ICD-10-CM

## 2025-05-16 DIAGNOSIS — Z01.818 PRE-OPERATIVE CLEARANCE: ICD-10-CM

## 2025-05-16 DIAGNOSIS — Z76.89 ESTABLISHING CARE WITH NEW DOCTOR, ENCOUNTER FOR: ICD-10-CM

## 2025-05-16 DIAGNOSIS — R94.31 ABNORMAL EKG: ICD-10-CM

## 2025-05-16 DIAGNOSIS — M26.79 TORI PRESENT ON RESIDUAL ALVEOLAR RIDGE OF MAXILLA: ICD-10-CM

## 2025-05-16 DIAGNOSIS — I45.10 RBBB: ICD-10-CM

## 2025-05-16 DIAGNOSIS — R06.09 DOE (DYSPNEA ON EXERTION): ICD-10-CM

## 2025-05-16 DIAGNOSIS — Z86.79 H/O PERICARDITIS: ICD-10-CM

## 2025-05-16 DIAGNOSIS — J38.3 VOCAL CORD DYSFUNCTION: ICD-10-CM

## 2025-05-16 DIAGNOSIS — L12.1: ICD-10-CM

## 2025-05-16 PROCEDURE — A9500 TC99M SESTAMIBI: HCPCS | Performed by: INTERNAL MEDICINE

## 2025-05-16 PROCEDURE — 78452 HT MUSCLE IMAGE SPECT MULT: CPT | Performed by: INTERNAL MEDICINE

## 2025-05-16 PROCEDURE — 93017 CV STRESS TEST TRACING ONLY: CPT | Performed by: INTERNAL MEDICINE

## 2025-05-16 PROCEDURE — 93306 TTE W/DOPPLER COMPLETE: CPT | Performed by: INTERNAL MEDICINE

## 2025-05-16 RX ORDER — TETRAKIS(2-METHOXYISOBUTYLISOCYANIDE)COPPER(I) TETRAFLUOROBORATE 1 MG/ML
22.2 INJECTION, POWDER, LYOPHILIZED, FOR SOLUTION INTRAVENOUS
Status: COMPLETED | OUTPATIENT
Start: 2025-05-16 | End: 2025-05-16

## 2025-05-16 RX ORDER — TETRAKIS(2-METHOXYISOBUTYLISOCYANIDE)COPPER(I) TETRAFLUOROBORATE 1 MG/ML
8 INJECTION, POWDER, LYOPHILIZED, FOR SOLUTION INTRAVENOUS
Status: COMPLETED | OUTPATIENT
Start: 2025-05-16 | End: 2025-05-16

## 2025-05-16 RX ADMIN — TECHNETIUM TC-99M SESTAMIBI 8 MILLICURIE: 1 INJECTION INTRAVENOUS at 08:55

## 2025-05-16 RX ADMIN — TECHNETIUM TC-99M SESTAMIBI 22.2 MILLICURIE: 1 INJECTION INTRAVENOUS at 10:10

## 2025-05-19 LAB
ECHO AO ASC DIAM: 3.5 CM
ECHO AO ASCENDING AORTA INDEX: 2.17 CM/M2
ECHO AO ROOT DIAM: 3.3 CM
ECHO AO ROOT INDEX: 2.05 CM/M2
ECHO AV AREA PEAK VELOCITY: 2.7 CM2
ECHO AV AREA VTI: 2.7 CM2
ECHO AV AREA/BSA PEAK VELOCITY: 1.7 CM2/M2
ECHO AV AREA/BSA VTI: 1.7 CM2/M2
ECHO AV MEAN GRADIENT: 3 MMHG
ECHO AV MEAN VELOCITY: 0.8 M/S
ECHO AV PEAK GRADIENT: 5 MMHG
ECHO AV PEAK VELOCITY: 1.2 M/S
ECHO AV VELOCITY RATIO: 0.83
ECHO AV VTI: 26.1 CM
ECHO BSA: 1.63 M2
ECHO EST RA PRESSURE: 3 MMHG
ECHO LA DIAMETER INDEX: 2.11 CM/M2
ECHO LA DIAMETER: 3.4 CM
ECHO LA TO AORTIC ROOT RATIO: 1.03
ECHO LA VOL A-L A2C: 40 ML (ref 22–52)
ECHO LA VOL A-L A4C: 52 ML (ref 22–52)
ECHO LA VOL BP: 46 ML (ref 22–52)
ECHO LA VOL MOD A2C: 39 ML (ref 22–52)
ECHO LA VOL MOD A4C: 50 ML (ref 22–52)
ECHO LA VOL/BSA BIPLANE: 29 ML/M2 (ref 16–34)
ECHO LA VOLUME AREA LENGTH: 47 ML
ECHO LA VOLUME INDEX A-L A2C: 25 ML/M2 (ref 16–34)
ECHO LA VOLUME INDEX A-L A4C: 32 ML/M2 (ref 16–34)
ECHO LA VOLUME INDEX AREA LENGTH: 29 ML/M2 (ref 16–34)
ECHO LA VOLUME INDEX MOD A2C: 24 ML/M2 (ref 16–34)
ECHO LA VOLUME INDEX MOD A4C: 31 ML/M2 (ref 16–34)
ECHO LV E' LATERAL VELOCITY: 7.62 CM/S
ECHO LV E' SEPTAL VELOCITY: 9.94 CM/S
ECHO LV EDV A2C: 54 ML
ECHO LV EDV A4C: 66 ML
ECHO LV EDV BP: 63 ML (ref 56–104)
ECHO LV EDV INDEX A4C: 41 ML/M2
ECHO LV EDV INDEX BP: 39 ML/M2
ECHO LV EDV NDEX A2C: 34 ML/M2
ECHO LV EF PHYSICIAN: 65 %
ECHO LV EJECTION FRACTION A2C: 61 %
ECHO LV EJECTION FRACTION A4C: 64 %
ECHO LV EJECTION FRACTION BIPLANE: 63 % (ref 55–100)
ECHO LV ESV A2C: 21 ML
ECHO LV ESV A4C: 23 ML
ECHO LV ESV BP: 23 ML (ref 19–49)
ECHO LV ESV INDEX A2C: 13 ML/M2
ECHO LV ESV INDEX A4C: 14 ML/M2
ECHO LV ESV INDEX BP: 14 ML/M2
ECHO LV FRACTIONAL SHORTENING: 35 % (ref 28–44)
ECHO LV INTERNAL DIMENSION DIASTOLE INDEX: 2.86 CM/M2
ECHO LV INTERNAL DIMENSION DIASTOLIC: 4.6 CM (ref 3.9–5.3)
ECHO LV INTERNAL DIMENSION SYSTOLIC INDEX: 1.86 CM/M2
ECHO LV INTERNAL DIMENSION SYSTOLIC: 3 CM
ECHO LV IVSD: 0.8 CM (ref 0.6–0.9)
ECHO LV MASS 2D: 117.9 G (ref 67–162)
ECHO LV MASS INDEX 2D: 73.2 G/M2 (ref 43–95)
ECHO LV POSTERIOR WALL DIASTOLIC: 0.8 CM (ref 0.6–0.9)
ECHO LV RELATIVE WALL THICKNESS RATIO: 0.35
ECHO LVOT AREA: 3.1 CM2
ECHO LVOT AV VTI INDEX: 0.89
ECHO LVOT DIAM: 2 CM
ECHO LVOT MEAN GRADIENT: 2 MMHG
ECHO LVOT PEAK GRADIENT: 4 MMHG
ECHO LVOT PEAK VELOCITY: 1 M/S
ECHO LVOT STROKE VOLUME INDEX: 45.1 ML/M2
ECHO LVOT SV: 72.5 ML
ECHO LVOT VTI: 23.1 CM
ECHO MV A VELOCITY: 0.82 M/S
ECHO MV E DECELERATION TIME (DT): 234.1 MS
ECHO MV E VELOCITY: 0.76 M/S
ECHO MV E/A RATIO: 0.93
ECHO MV E/E' LATERAL: 9.97
ECHO MV E/E' RATIO (AVERAGED): 8.81
ECHO MV E/E' SEPTAL: 7.65
ECHO PV MAX VELOCITY: 0.8 M/S
ECHO PV PEAK GRADIENT: 2 MMHG
ECHO RIGHT VENTRICULAR SYSTOLIC PRESSURE (RVSP): 28 MMHG
ECHO RV BASAL DIMENSION: 3.6 CM
ECHO RV TAPSE: 2 CM (ref 1.7–?)
ECHO RVOT PEAK GRADIENT: 2 MMHG
ECHO RVOT PEAK VELOCITY: 0.6 M/S
ECHO TV REGURGITANT MAX VELOCITY: 2.51 M/S
ECHO TV REGURGITANT PEAK GRADIENT: 25 MMHG

## 2025-05-19 PROCEDURE — 93306 TTE W/DOPPLER COMPLETE: CPT | Performed by: INTERNAL MEDICINE

## 2025-05-24 ENCOUNTER — RESULTS FOLLOW-UP (OUTPATIENT)
Age: 85
End: 2025-05-24

## 2025-05-24 LAB
ECHO BSA: 1.63 M2
NUC STRESS EJECTION FRACTION: 91 %
STRESS ANGINA INDEX: 0
STRESS BASELINE DIAS BP: 72 MMHG
STRESS BASELINE HR: 73 BPM
STRESS BASELINE ST DEPRESSION: 0 MM
STRESS BASELINE SYS BP: 120 MMHG
STRESS ESTIMATED WORKLOAD: 2.9 METS
STRESS EXERCISE DUR MIN: 4 MIN
STRESS EXERCISE DUR SEC: 10 SEC
STRESS O2 SAT PEAK: 94 %
STRESS O2 SAT REST: 96 %
STRESS PEAK DIAS BP: 84 MMHG
STRESS PEAK SYS BP: 146 MMHG
STRESS PERCENT HR ACHIEVED: 87 %
STRESS POST PEAK HR: 118 BPM
STRESS RATE PRESSURE PRODUCT: NORMAL BPM*MMHG
STRESS SR DUKE TREADMILL SCORE: 4
STRESS ST DEPRESSION: 0 MM
STRESS TARGET HR: 135 BPM
TID: 0.87

## 2025-05-24 PROCEDURE — 93018 CV STRESS TEST I&R ONLY: CPT | Performed by: INTERNAL MEDICINE

## 2025-05-24 PROCEDURE — 93016 CV STRESS TEST SUPVJ ONLY: CPT | Performed by: INTERNAL MEDICINE

## 2025-05-24 PROCEDURE — PBSHW PBB SHADOW CHARGE: Performed by: INTERNAL MEDICINE

## 2025-05-24 PROCEDURE — 78452 HT MUSCLE IMAGE SPECT MULT: CPT | Performed by: INTERNAL MEDICINE

## 2025-05-24 NOTE — RESULT ENCOUNTER NOTE
Please advise echo shows normal heart function with no significant valve problems.  Follow-up as scheduled.     Future Appointments  6/26/2025  3:00 PM    BARB Hernandez MD [facility]  7/28/2025  3:40 PM    Sal Diggs MD [facility]               AMB

## 2025-05-29 PROBLEM — M48.061 SPINAL STENOSIS, LUMBAR REGION, WITHOUT NEUROGENIC CLAUDICATION: Status: ACTIVE | Noted: 2025-05-29

## 2025-05-29 NOTE — PERIOP NOTE
PATIENT RETURNED CALL; WILL BE SEEING DR. VAN NEXT TUES. 6-3.  DTR HAVING HAND SURGERY IN JUNE SO SURGERY WILL LIKELY BE RESCHEDULED.  TOLD HER OUR OFFICE WILL FOLLOW UP WITH HER AFTER 6-3.

## 2025-06-04 ENCOUNTER — HOSPITAL ENCOUNTER (OUTPATIENT)
Facility: HOSPITAL | Age: 85
Discharge: HOME | End: 2025-06-07
Payer: MEDICARE

## 2025-06-04 VITALS
BODY MASS INDEX: 24.14 KG/M2 | HEIGHT: 62 IN | SYSTOLIC BLOOD PRESSURE: 94 MMHG | TEMPERATURE: 98 F | WEIGHT: 131.2 LBS | HEART RATE: 73 BPM | DIASTOLIC BLOOD PRESSURE: 64 MMHG

## 2025-06-04 LAB
ABO + RH BLD: NORMAL
ANION GAP SERPL CALC-SCNC: 8 MMOL/L (ref 2–12)
APPEARANCE UR: CLEAR
BACTERIA URNS QL MICRO: NEGATIVE /HPF
BILIRUB UR QL: NEGATIVE
BLOOD GROUP ANTIBODIES SERPL: NORMAL
BUN SERPL-MCNC: 13 MG/DL (ref 6–20)
BUN/CREAT SERPL: 19 (ref 12–20)
CALCIUM SERPL-MCNC: 9.8 MG/DL (ref 8.5–10.1)
CHLORIDE SERPL-SCNC: 101 MMOL/L (ref 97–108)
CO2 SERPL-SCNC: 28 MMOL/L (ref 21–32)
COLOR UR: NORMAL
CREAT SERPL-MCNC: 0.69 MG/DL (ref 0.55–1.02)
EPITH CASTS URNS QL MICRO: NORMAL /LPF
ERYTHROCYTE [DISTWIDTH] IN BLOOD BY AUTOMATED COUNT: 13.4 % (ref 11.5–14.5)
EST. AVERAGE GLUCOSE BLD GHB EST-MCNC: 114 MG/DL
GLUCOSE SERPL-MCNC: 94 MG/DL (ref 65–100)
GLUCOSE UR STRIP.AUTO-MCNC: NEGATIVE MG/DL
HBA1C MFR BLD: 5.6 % (ref 4–5.6)
HCT VFR BLD AUTO: 37.9 % (ref 35–47)
HGB BLD-MCNC: 11.8 G/DL (ref 11.5–16)
HGB UR QL STRIP: NEGATIVE
HYALINE CASTS URNS QL MICRO: NORMAL /LPF (ref 0–5)
INR PPP: 1 (ref 0.9–1.1)
KETONES UR QL STRIP.AUTO: NEGATIVE MG/DL
LEUKOCYTE ESTERASE UR QL STRIP.AUTO: NEGATIVE
MCH RBC QN AUTO: 30.3 PG (ref 26–34)
MCHC RBC AUTO-ENTMCNC: 31.1 G/DL (ref 30–36.5)
MCV RBC AUTO: 97.4 FL (ref 80–99)
NITRITE UR QL STRIP.AUTO: NEGATIVE
NRBC # BLD: 0 K/UL (ref 0–0.01)
NRBC BLD-RTO: 0 PER 100 WBC
PH UR STRIP: 7 (ref 5–8)
PLATELET # BLD AUTO: 252 K/UL (ref 150–400)
PMV BLD AUTO: 9.9 FL (ref 8.9–12.9)
POTASSIUM SERPL-SCNC: 3.9 MMOL/L (ref 3.5–5.1)
PROT UR STRIP-MCNC: NEGATIVE MG/DL
PROTHROMBIN TIME: 10.9 SEC (ref 9.2–11.2)
RBC # BLD AUTO: 3.89 M/UL (ref 3.8–5.2)
RBC #/AREA URNS HPF: NORMAL /HPF (ref 0–5)
SODIUM SERPL-SCNC: 137 MMOL/L (ref 136–145)
SP GR UR REFRACTOMETRY: 1.01 (ref 1–1.03)
SPECIMEN EXP DATE BLD: NORMAL
URINE CULTURE IF INDICATED: NORMAL
UROBILINOGEN UR QL STRIP.AUTO: 0.2 EU/DL (ref 0.2–1)
WBC # BLD AUTO: 4.7 K/UL (ref 3.6–11)
WBC URNS QL MICRO: NORMAL /HPF (ref 0–4)

## 2025-06-04 PROCEDURE — 85027 COMPLETE CBC AUTOMATED: CPT

## 2025-06-04 PROCEDURE — 85610 PROTHROMBIN TIME: CPT

## 2025-06-04 PROCEDURE — 86850 RBC ANTIBODY SCREEN: CPT

## 2025-06-04 PROCEDURE — 86900 BLOOD TYPING SEROLOGIC ABO: CPT

## 2025-06-04 PROCEDURE — 80048 BASIC METABOLIC PNL TOTAL CA: CPT

## 2025-06-04 PROCEDURE — 81001 URINALYSIS AUTO W/SCOPE: CPT

## 2025-06-04 PROCEDURE — 83036 HEMOGLOBIN GLYCOSYLATED A1C: CPT

## 2025-06-04 PROCEDURE — 86901 BLOOD TYPING SEROLOGIC RH(D): CPT

## 2025-06-04 NOTE — PERIOP NOTE
[address] [address]     MAIN PRE OP             [phone]                                                                                AMBULATORY PRE OP          [phone]    PRE-ADMISSION TESTING    [phone]     Surgery Date:  6/11/25       [facility] staff will call you between 4 and 7pm the day before your surgery with your arrival time. (If your surgery is on a Monday, we will call you the Friday before.)    Call [phone] after 7pm Monday-Friday if you did not receive this call.    INSTRUCTIONS BEFORE YOUR SURGERY   When You  Arrive Arrive at [facility] Patient Access on 1st floor the day of your surgery.  Have your insurance card, photo ID,living will/advanced directive/POA (if applicable),  and any copayment (if needed)   Food   and   Drink NO solid food after midnight the night before surgery. You can drink clear liquids from midnight until ONE hour prior to your arrival at the hospital on the day of your surgery.     Clear liquids include:  Water  Apple juice (no sediment)  Carbonated beverages  Black coffee(no cream/milk)  Tea(no cream/milk)  Gatorade    No alcohol (beer, wine, liquor) or marijuana (smoking) 24 hours prior to surgery.   No edibles for 3 days prior to surgery.    Stop smoking cigarettes 14 days before surgery (helps w/healing and breathing).   Medications to   TAKE   Morning of Surgery MEDICATIONS TO TAKE THE MORNING OF SURGERY WITH A SIP OF WATER: NONE    You may take these medications, IF NEEDED, the morning of surgery: TRAMADOL (MUST BE TAKEN 4HRS PRIOR TO ARRIVAL AT HOSPITAL)  TYLENOL    Ask your surgeon/prescribing doctor for instructions on taking or stopping these medications prior to surgery: METHOTREXATE AND FOLIC ACID   Medications to STOP  before surgery Non-Steroidal anti-inflammatory Drugs (NSAID's): for example, Diclofenac(Voltaren),  Ibuprofen (Advil, Motrin), Naproxen (Aleve) 3 days    STOP

## 2025-06-04 NOTE — PERIOP NOTE
CARDIOLOGY NOTE/DR. CHISHOLM NOTED IN CC ON 4/7/25.    NUCLEAR EXERCISE STRESS TEST RESULTS IN CC ON 5/16/25.    TTE (ECHO) RESULTS IN CC ON 5/16/25.    CARDIAC CLEARANCE IS NOTED IN CC FROM DR. CHISHOLM ON 5/26/25 (THIS IS FOUND IN CC UNDER RESULTS FOR PT'S NUCLEAR EXERCISE STRESS TEST)    CALLED TO PAR/[phone] AND REQ RECENT OFFICE NOTES TO BE FAXED TO KETURAH GODINEZ W/ [facility].  NOTES REC'D AND PLACED ON CHART.          PT HAS SEVERAL CONCERNS FOR ANESTHESIA THE DAY OF HER SURGERY:    OVER PRODUCTION OF MUCOUS TO HER EYES WHEN LAYING FLAT (STATES SHE WILL NOT BE ABLE TO SEE AND IT CAN TAKE UP TO 15MIN TO OPEN HER EYES ONCE THIS HAPPENS)    2.   PT HAS VOCAL CORD DYSFUNCTION (WORRIED ABOUT INTUBATION) (SHE GETS NO WARNING AND CAN ALL OF A SUDDEN NOT BE ABLE TO BREATHE IN)    3.   PT HAS MAXILLARY FATEMEH TO THE ROOF OF HER MOUTH (AGAIN, WORRIED ABOUT INTUBATION)    4.    PT WANTS TO MAKE SURE SHE SPEAKS TO ALL ANESTHESIA PROVIDERS THE DAY OF SURGERY.  I HAVE ASSURED HER SHE WILL SPEAK W/ HER CRNA AND HER ANESTHESIOLOGIST PROVIDER.      I ADVISED PT THAT ALL OF THIS HAS BEEN PLACED IN OUR ANESTHESIA HUDDLE AS WELL.

## 2025-06-04 NOTE — H&P
lumbar region with neurogenic claudication     Update History & Physical    The patient's History and Physical of Cesilia 10, 2025 was reviewed with the patient and I examined the patient. There was no change. The surgical site was confirmed by the patient and me.       Plan: The risks, benefits, expected outcome, and alternative to the recommended procedure have been discussed with the patient. Patient understands and wants to proceed with the procedure.     Electronically signed by Roman Santiago MD on 2025 at 8:08 AM      No follow-ups on file.        SUBJECTIVE/OBJECTIVE:  Aundrea Simon (: 1940) is a 85 y.o. female.     Failed to redirect to the Timeline version of the [employer] Smart[employer].     Patient presents today for follow-up of her back.  She was previously signed up for an L5-S1 lumbar laminectomy in March of this year but was not able to get medically cleared prior to surgery.  She needed follow-ups with pulmonology, cardiology and ENT.  Today she states that she has seen all of these specialties and has been cleared, and is scheduled to see her PCP on Thursday of this week, and is hopeful to get cleared at that point.  She continues with persistent pain radiating down the posterior aspect of her right lower extremity as well as numbness and tingling in the anterior portion of her lower leg from her knee down to her foot.  She has been experiencing low back stiffness and soreness, and has been taking tramadol for this issue.  She had an MATTHIAS with pain management 8 weeks ago, and states that it provided good relief of her symptoms for 6 weeks, but unfortunately has had a return of her symptoms.  She is hopeful to get scheduled for surgery later this month.  No acute loss in bowel or bladder control.  No saddle anesthesia.  Denies any leg weakness.  Of note, she has a few concerns with undergoing general anesthesia to include numbness in her body when laying flat for prolonged periods of time,

## 2025-06-05 LAB
BACTERIA SPEC CULT: NORMAL
BACTERIA SPEC CULT: NORMAL
SERVICE CMNT-IMP: NORMAL

## 2025-06-09 ENCOUNTER — OFFICE VISIT (OUTPATIENT)
Age: 85
End: 2025-06-09

## 2025-06-09 VITALS
RESPIRATION RATE: 16 BRPM | WEIGHT: 132 LBS | HEART RATE: 67 BPM | DIASTOLIC BLOOD PRESSURE: 63 MMHG | OXYGEN SATURATION: 95 % | BODY MASS INDEX: 24.29 KG/M2 | SYSTOLIC BLOOD PRESSURE: 96 MMHG | HEIGHT: 62 IN | TEMPERATURE: 98.3 F

## 2025-06-09 DIAGNOSIS — L03.032 INFLAMMATION OF TOENAIL OF LEFT FOOT: Primary | ICD-10-CM

## 2025-06-09 DIAGNOSIS — M79.675 TOE PAIN, CHRONIC, LEFT: ICD-10-CM

## 2025-06-09 DIAGNOSIS — G89.29 TOE PAIN, CHRONIC, LEFT: ICD-10-CM

## 2025-06-09 RX ORDER — TRIAMCINOLONE ACETONIDE 1 MG/G
CREAM TOPICAL
Qty: 15 G | Refills: 0 | Status: ON HOLD | OUTPATIENT
Start: 2025-06-09 | End: 2025-06-12 | Stop reason: HOSPADM

## 2025-06-09 NOTE — PROGRESS NOTES
appreciable swelling or erythema though the area is tender to palpation.  No drainage.  The toenail goes deep into the edges at the toenail but does not appear to be ingrown.  She has an apparent hammertoe deformity of the 2nd, 3rd and 4th toes.   Skin:     General: Skin is warm and dry.      Findings: No erythema or rash.   Neurological:      General: No focal deficit present.      Mental Status: She is alert and oriented to person, place, and time.   Psychiatric:         Mood and Affect: Mood normal.         Behavior: Behavior normal.         Thought Content: Thought content normal.         Judgment: Judgment normal.          ASSESSMENT/PLAN:  1. Inflammation of toenail of left foot  -     triamcinolone (KENALOG) 0.1 % cream; Apply a thin layer topically twice daily., Disp-15 g, R-0, Normal  2. Toe pain, chronic, left  -     [facility] - Mouna Dean DPM, Podiatry, [facility] ([address])      Left great toe and second toe pain -  Counseled patient there is no sign of infection, it could be related to friction from the corners of her toenails rubbing into her skin, no obvious ingrown toenail, no paronychia or cellulitis  Recommend follow-up with podiatry, referral placed, please call to make an appointment  Trial of topical steroid cream in the meantime, apply a thin layer twice daily to the affected areas, not to exceed 14 days due to risk of skin thinning (counseled patient about awaiting podiatry referral, patient states she would like to go ahead and try the steroid cream now)  Intermittent foot soaks may help with any nail edges digging into the corners of the toes  Call or return to clinic if any concerns  Patient comfortable with plan       An electronic signature was used to authenticate this note.    Deepti Holm PA-C

## 2025-06-09 NOTE — PATIENT INSTRUCTIONS
Left great toe and second toe pain -  Recommend follow-up with podiatry, referral placed, please call to make an appointment  Trial of topical steroid cream in the meantime, apply a thin layer twice daily to the affected areas, not to exceed 14 days due to risk of skin thinning  Intermittent foot soaks may help with any nail edges digging into the corners of the toes  Call or return to clinic if any concerns

## 2025-06-10 ENCOUNTER — ANESTHESIA EVENT (OUTPATIENT)
Facility: HOSPITAL | Age: 85
End: 2025-06-10
Payer: MEDICARE

## 2025-06-11 ENCOUNTER — APPOINTMENT (OUTPATIENT)
Facility: HOSPITAL | Age: 85
End: 2025-06-11
Attending: ORTHOPAEDIC SURGERY
Payer: MEDICARE

## 2025-06-11 ENCOUNTER — HOSPITAL ENCOUNTER (OUTPATIENT)
Facility: HOSPITAL | Age: 85
Setting detail: OBSERVATION
Discharge: HOME HEALTH | End: 2025-06-12
Attending: ORTHOPAEDIC SURGERY | Admitting: ORTHOPAEDIC SURGERY
Payer: MEDICARE

## 2025-06-11 ENCOUNTER — ANESTHESIA (OUTPATIENT)
Facility: HOSPITAL | Age: 85
End: 2025-06-11
Payer: MEDICARE

## 2025-06-11 DIAGNOSIS — M48.062 LUMBAR STENOSIS WITH NEUROGENIC CLAUDICATION: Primary | ICD-10-CM

## 2025-06-11 PROCEDURE — 2580000003 HC RX 258

## 2025-06-11 PROCEDURE — 6360000002 HC RX W HCPCS

## 2025-06-11 PROCEDURE — 7100000001 HC PACU RECOVERY - ADDTL 15 MIN: Performed by: ORTHOPAEDIC SURGERY

## 2025-06-11 PROCEDURE — 7100000000 HC PACU RECOVERY - FIRST 15 MIN: Performed by: ORTHOPAEDIC SURGERY

## 2025-06-11 PROCEDURE — 3600000004 HC SURGERY LEVEL 4 BASE: Performed by: ORTHOPAEDIC SURGERY

## 2025-06-11 PROCEDURE — 3600000014 HC SURGERY LEVEL 4 ADDTL 15MIN: Performed by: ORTHOPAEDIC SURGERY

## 2025-06-11 PROCEDURE — 2500000003 HC RX 250 WO HCPCS: Performed by: ORTHOPAEDIC SURGERY

## 2025-06-11 PROCEDURE — 97530 THERAPEUTIC ACTIVITIES: CPT

## 2025-06-11 PROCEDURE — 97161 PT EVAL LOW COMPLEX 20 MIN: CPT

## 2025-06-11 PROCEDURE — 3700000001 HC ADD 15 MINUTES (ANESTHESIA): Performed by: ORTHOPAEDIC SURGERY

## 2025-06-11 PROCEDURE — G0378 HOSPITAL OBSERVATION PER HR: HCPCS

## 2025-06-11 PROCEDURE — 2500000003 HC RX 250 WO HCPCS

## 2025-06-11 PROCEDURE — 6370000000 HC RX 637 (ALT 250 FOR IP)

## 2025-06-11 PROCEDURE — 3700000000 HC ANESTHESIA ATTENDED CARE: Performed by: ORTHOPAEDIC SURGERY

## 2025-06-11 PROCEDURE — 6360000002 HC RX W HCPCS: Performed by: ORTHOPAEDIC SURGERY

## 2025-06-11 PROCEDURE — 6360000002 HC RX W HCPCS: Performed by: STUDENT IN AN ORGANIZED HEALTH CARE EDUCATION/TRAINING PROGRAM

## 2025-06-11 PROCEDURE — 2709999900 HC NON-CHARGEABLE SUPPLY: Performed by: ORTHOPAEDIC SURGERY

## 2025-06-11 RX ORDER — SENNA AND DOCUSATE SODIUM 50; 8.6 MG/1; MG/1
1 TABLET, FILM COATED ORAL 2 TIMES DAILY
Status: DISCONTINUED | OUTPATIENT
Start: 2025-06-11 | End: 2025-06-12 | Stop reason: HOSPADM

## 2025-06-11 RX ORDER — POLYETHYLENE GLYCOL 3350 17 G/17G
17 POWDER, FOR SOLUTION ORAL DAILY PRN
Status: CANCELLED | OUTPATIENT
Start: 2025-06-11

## 2025-06-11 RX ORDER — PROCHLORPERAZINE EDISYLATE 5 MG/ML
5 INJECTION INTRAMUSCULAR; INTRAVENOUS
Status: DISCONTINUED | OUTPATIENT
Start: 2025-06-11 | End: 2025-06-11 | Stop reason: HOSPADM

## 2025-06-11 RX ORDER — SODIUM CHLORIDE 0.9 % (FLUSH) 0.9 %
5-40 SYRINGE (ML) INJECTION PRN
Status: DISCONTINUED | OUTPATIENT
Start: 2025-06-11 | End: 2025-06-11 | Stop reason: HOSPADM

## 2025-06-11 RX ORDER — FOLIC ACID 1 MG/1
1000 TABLET ORAL DAILY
COMMUNITY
Start: 2025-05-23

## 2025-06-11 RX ORDER — PHENYLEPHRINE HCL IN 0.9% NACL 0.4MG/10ML
SYRINGE (ML) INTRAVENOUS
Status: DISCONTINUED | OUTPATIENT
Start: 2025-06-11 | End: 2025-06-11 | Stop reason: SDUPTHER

## 2025-06-11 RX ORDER — POLYETHYLENE GLYCOL 3350 17 G/17G
17 POWDER, FOR SOLUTION ORAL DAILY
Status: DISCONTINUED | OUTPATIENT
Start: 2025-06-11 | End: 2025-06-12 | Stop reason: HOSPADM

## 2025-06-11 RX ORDER — ROCURONIUM BROMIDE 10 MG/ML
INJECTION, SOLUTION INTRAVENOUS
Status: DISCONTINUED | OUTPATIENT
Start: 2025-06-11 | End: 2025-06-11 | Stop reason: SDUPTHER

## 2025-06-11 RX ORDER — HYDROMORPHONE HYDROCHLORIDE 1 MG/ML
0.5 INJECTION, SOLUTION INTRAMUSCULAR; INTRAVENOUS; SUBCUTANEOUS
Status: DISCONTINUED | OUTPATIENT
Start: 2025-06-11 | End: 2025-06-12 | Stop reason: HOSPADM

## 2025-06-11 RX ORDER — SODIUM CHLORIDE 0.9 % (FLUSH) 0.9 %
5-40 SYRINGE (ML) INJECTION EVERY 12 HOURS SCHEDULED
Status: DISCONTINUED | OUTPATIENT
Start: 2025-06-11 | End: 2025-06-12 | Stop reason: HOSPADM

## 2025-06-11 RX ORDER — DIPHENHYDRAMINE HYDROCHLORIDE 50 MG/ML
25 INJECTION, SOLUTION INTRAMUSCULAR; INTRAVENOUS EVERY 6 HOURS PRN
Status: DISCONTINUED | OUTPATIENT
Start: 2025-06-11 | End: 2025-06-12 | Stop reason: HOSPADM

## 2025-06-11 RX ORDER — MAGNESIUM SULFATE HEPTAHYDRATE 40 MG/ML
INJECTION, SOLUTION INTRAVENOUS
Status: DISCONTINUED | OUTPATIENT
Start: 2025-06-11 | End: 2025-06-11 | Stop reason: SDUPTHER

## 2025-06-11 RX ORDER — DIPHENHYDRAMINE HCL 25 MG
25 CAPSULE ORAL EVERY 6 HOURS PRN
Status: CANCELLED | OUTPATIENT
Start: 2025-06-11

## 2025-06-11 RX ORDER — SODIUM CHLORIDE, SODIUM LACTATE, POTASSIUM CHLORIDE, CALCIUM CHLORIDE 600; 310; 30; 20 MG/100ML; MG/100ML; MG/100ML; MG/100ML
INJECTION, SOLUTION INTRAVENOUS
Status: DISCONTINUED | OUTPATIENT
Start: 2025-06-11 | End: 2025-06-11 | Stop reason: SDUPTHER

## 2025-06-11 RX ORDER — FENTANYL CITRATE 50 UG/ML
25 INJECTION, SOLUTION INTRAMUSCULAR; INTRAVENOUS EVERY 5 MIN PRN
Refills: 0 | Status: DISCONTINUED | OUTPATIENT
Start: 2025-06-11 | End: 2025-06-11 | Stop reason: HOSPADM

## 2025-06-11 RX ORDER — HYDROCODONE BITARTRATE AND ACETAMINOPHEN 5; 325 MG/1; MG/1
1 TABLET ORAL EVERY 4 HOURS PRN
Status: DISCONTINUED | OUTPATIENT
Start: 2025-06-11 | End: 2025-06-12 | Stop reason: HOSPADM

## 2025-06-11 RX ORDER — POLYETHYLENE GLYCOL 3350 17 G/17G
17 POWDER, FOR SOLUTION ORAL DAILY PRN
Status: DISCONTINUED | OUTPATIENT
Start: 2025-06-11 | End: 2025-06-12 | Stop reason: HOSPADM

## 2025-06-11 RX ORDER — ACETAMINOPHEN 500 MG
1000 TABLET ORAL EVERY 6 HOURS
Status: CANCELLED | OUTPATIENT
Start: 2025-06-11

## 2025-06-11 RX ORDER — HYDRALAZINE HYDROCHLORIDE 20 MG/ML
10 INJECTION INTRAMUSCULAR; INTRAVENOUS ONCE
Status: DISCONTINUED | OUTPATIENT
Start: 2025-06-11 | End: 2025-06-11 | Stop reason: HOSPADM

## 2025-06-11 RX ORDER — FERROUS SULFATE 325(65) MG
325 TABLET ORAL DAILY
Status: DISCONTINUED | OUTPATIENT
Start: 2025-06-11 | End: 2025-06-12 | Stop reason: HOSPADM

## 2025-06-11 RX ORDER — ONDANSETRON 4 MG/1
4 TABLET, ORALLY DISINTEGRATING ORAL EVERY 8 HOURS PRN
Status: DISCONTINUED | OUTPATIENT
Start: 2025-06-11 | End: 2025-06-12 | Stop reason: HOSPADM

## 2025-06-11 RX ORDER — MULTIVITAMIN WITH IRON
1 TABLET ORAL DAILY
Status: DISCONTINUED | OUTPATIENT
Start: 2025-06-11 | End: 2025-06-12 | Stop reason: HOSPADM

## 2025-06-11 RX ORDER — FAMOTIDINE 20 MG/1
20 TABLET, FILM COATED ORAL 2 TIMES DAILY
Status: CANCELLED | OUTPATIENT
Start: 2025-06-11

## 2025-06-11 RX ORDER — FAMOTIDINE 20 MG/1
20 TABLET, FILM COATED ORAL DAILY
Status: DISCONTINUED | OUTPATIENT
Start: 2025-06-11 | End: 2025-06-12 | Stop reason: HOSPADM

## 2025-06-11 RX ORDER — DEXAMETHASONE SODIUM PHOSPHATE 4 MG/ML
INJECTION, SOLUTION INTRA-ARTICULAR; INTRALESIONAL; INTRAMUSCULAR; INTRAVENOUS; SOFT TISSUE
Status: DISCONTINUED | OUTPATIENT
Start: 2025-06-11 | End: 2025-06-11 | Stop reason: SDUPTHER

## 2025-06-11 RX ORDER — CYCLOBENZAPRINE HCL 10 MG
10 TABLET ORAL EVERY 12 HOURS PRN
Status: CANCELLED | OUTPATIENT
Start: 2025-06-11

## 2025-06-11 RX ORDER — SODIUM CHLORIDE 0.9 % (FLUSH) 0.9 %
5-40 SYRINGE (ML) INJECTION EVERY 12 HOURS SCHEDULED
Status: DISCONTINUED | OUTPATIENT
Start: 2025-06-11 | End: 2025-06-11 | Stop reason: HOSPADM

## 2025-06-11 RX ORDER — SODIUM CHLORIDE 0.9 % (FLUSH) 0.9 %
5-40 SYRINGE (ML) INJECTION PRN
Status: CANCELLED | OUTPATIENT
Start: 2025-06-11

## 2025-06-11 RX ORDER — ONDANSETRON 2 MG/ML
4 INJECTION INTRAMUSCULAR; INTRAVENOUS
Status: DISCONTINUED | OUTPATIENT
Start: 2025-06-11 | End: 2025-06-11 | Stop reason: HOSPADM

## 2025-06-11 RX ORDER — HYDROMORPHONE HYDROCHLORIDE 1 MG/ML
0.5 INJECTION, SOLUTION INTRAMUSCULAR; INTRAVENOUS; SUBCUTANEOUS EVERY 5 MIN PRN
Status: DISCONTINUED | OUTPATIENT
Start: 2025-06-11 | End: 2025-06-11 | Stop reason: HOSPADM

## 2025-06-11 RX ORDER — OXYCODONE HYDROCHLORIDE 5 MG/1
5 TABLET ORAL
Refills: 0 | Status: DISCONTINUED | OUTPATIENT
Start: 2025-06-11 | End: 2025-06-11 | Stop reason: HOSPADM

## 2025-06-11 RX ORDER — ACETAMINOPHEN 500 MG
1000 TABLET ORAL EVERY 6 HOURS
Status: DISCONTINUED | OUTPATIENT
Start: 2025-06-11 | End: 2025-06-12 | Stop reason: HOSPADM

## 2025-06-11 RX ORDER — SODIUM CHLORIDE 9 MG/ML
INJECTION, SOLUTION INTRAVENOUS PRN
Status: CANCELLED | OUTPATIENT
Start: 2025-06-11

## 2025-06-11 RX ORDER — ONDANSETRON 2 MG/ML
INJECTION INTRAMUSCULAR; INTRAVENOUS
Status: DISCONTINUED | OUTPATIENT
Start: 2025-06-11 | End: 2025-06-11 | Stop reason: SDUPTHER

## 2025-06-11 RX ORDER — CALCIUM CARBONATE 500(1250)
500 TABLET ORAL 2 TIMES DAILY
Status: DISCONTINUED | OUTPATIENT
Start: 2025-06-11 | End: 2025-06-12 | Stop reason: HOSPADM

## 2025-06-11 RX ORDER — PSEUDOEPHEDRINE HCL 30 MG
1 TABLET ORAL 2 TIMES DAILY PRN
Status: CANCELLED | OUTPATIENT
Start: 2025-06-11

## 2025-06-11 RX ORDER — FENTANYL CITRATE 50 UG/ML
25 INJECTION, SOLUTION INTRAMUSCULAR; INTRAVENOUS EVERY 5 MIN PRN
Status: DISCONTINUED | OUTPATIENT
Start: 2025-06-11 | End: 2025-06-11 | Stop reason: HOSPADM

## 2025-06-11 RX ORDER — BUPIVACAINE HYDROCHLORIDE 5 MG/ML
INJECTION, SOLUTION EPIDURAL; INTRACAUDAL; PERINEURAL PRN
Status: DISCONTINUED | OUTPATIENT
Start: 2025-06-11 | End: 2025-06-11 | Stop reason: HOSPADM

## 2025-06-11 RX ORDER — ONDANSETRON 2 MG/ML
4 INJECTION INTRAMUSCULAR; INTRAVENOUS EVERY 6 HOURS PRN
Status: CANCELLED | OUTPATIENT
Start: 2025-06-11

## 2025-06-11 RX ORDER — NALOXONE HYDROCHLORIDE 0.4 MG/ML
INJECTION, SOLUTION INTRAMUSCULAR; INTRAVENOUS; SUBCUTANEOUS PRN
Status: DISCONTINUED | OUTPATIENT
Start: 2025-06-11 | End: 2025-06-11 | Stop reason: HOSPADM

## 2025-06-11 RX ORDER — EPHEDRINE SULFATE 50 MG/ML
INJECTION INTRAVENOUS
Status: DISCONTINUED | OUTPATIENT
Start: 2025-06-11 | End: 2025-06-11 | Stop reason: SDUPTHER

## 2025-06-11 RX ORDER — GLYCOPYRROLATE 0.2 MG/ML
INJECTION, SOLUTION INTRAMUSCULAR; INTRAVENOUS
Status: DISCONTINUED | OUTPATIENT
Start: 2025-06-11 | End: 2025-06-11 | Stop reason: SDUPTHER

## 2025-06-11 RX ORDER — DOCUSATE SODIUM 100 MG/1
100 CAPSULE, LIQUID FILLED ORAL 2 TIMES DAILY PRN
Status: DISCONTINUED | OUTPATIENT
Start: 2025-06-11 | End: 2025-06-12 | Stop reason: HOSPADM

## 2025-06-11 RX ORDER — ONDANSETRON 4 MG/1
4 TABLET, ORALLY DISINTEGRATING ORAL EVERY 8 HOURS PRN
Status: CANCELLED | OUTPATIENT
Start: 2025-06-11

## 2025-06-11 RX ORDER — VITAMIN B COMPLEX
2000 TABLET ORAL DAILY
Status: DISCONTINUED | OUTPATIENT
Start: 2025-06-11 | End: 2025-06-12 | Stop reason: HOSPADM

## 2025-06-11 RX ORDER — CHOLECALCIFEROL (VITAMIN D3) 50 MCG
1 TABLET ORAL DAILY
Status: CANCELLED | OUTPATIENT
Start: 2025-06-11

## 2025-06-11 RX ORDER — SODIUM CHLORIDE 0.9 % (FLUSH) 0.9 %
5-40 SYRINGE (ML) INJECTION EVERY 12 HOURS SCHEDULED
Status: CANCELLED | OUTPATIENT
Start: 2025-06-11

## 2025-06-11 RX ORDER — HYDROCODONE BITARTRATE AND ACETAMINOPHEN 5; 325 MG/1; MG/1
1 TABLET ORAL EVERY 4 HOURS PRN
Refills: 0 | Status: CANCELLED | OUTPATIENT
Start: 2025-06-11

## 2025-06-11 RX ORDER — HYDROCODONE BITARTRATE AND ACETAMINOPHEN 5; 325 MG/1; MG/1
2 TABLET ORAL EVERY 4 HOURS PRN
Refills: 0 | Status: CANCELLED | OUTPATIENT
Start: 2025-06-11

## 2025-06-11 RX ORDER — SODIUM CHLORIDE 0.9 % (FLUSH) 0.9 %
5-40 SYRINGE (ML) INJECTION PRN
Status: DISCONTINUED | OUTPATIENT
Start: 2025-06-11 | End: 2025-06-12 | Stop reason: HOSPADM

## 2025-06-11 RX ORDER — POLYETHYLENE GLYCOL 3350 17 G/17G
17 POWDER, FOR SOLUTION ORAL DAILY
Status: CANCELLED | OUTPATIENT
Start: 2025-06-11

## 2025-06-11 RX ORDER — LIDOCAINE HYDROCHLORIDE 20 MG/ML
INJECTION, SOLUTION EPIDURAL; INFILTRATION; INTRACAUDAL; PERINEURAL
Status: DISCONTINUED | OUTPATIENT
Start: 2025-06-11 | End: 2025-06-11 | Stop reason: SDUPTHER

## 2025-06-11 RX ORDER — SODIUM CHLORIDE 9 MG/ML
INJECTION, SOLUTION INTRAVENOUS CONTINUOUS
Status: CANCELLED | OUTPATIENT
Start: 2025-06-11

## 2025-06-11 RX ORDER — PROPOFOL 10 MG/ML
INJECTION, EMULSION INTRAVENOUS
Status: DISCONTINUED | OUTPATIENT
Start: 2025-06-11 | End: 2025-06-11 | Stop reason: SDUPTHER

## 2025-06-11 RX ORDER — ELECTROLYTES/DEXTROSE
1 SOLUTION, ORAL ORAL DAILY
Status: CANCELLED | OUTPATIENT
Start: 2025-06-11

## 2025-06-11 RX ORDER — DIPHENHYDRAMINE HYDROCHLORIDE 50 MG/ML
25 INJECTION, SOLUTION INTRAMUSCULAR; INTRAVENOUS EVERY 6 HOURS PRN
Status: DISCONTINUED | OUTPATIENT
Start: 2025-06-11 | End: 2025-06-11 | Stop reason: SDUPTHER

## 2025-06-11 RX ORDER — FOLIC ACID 1 MG/1
1000 TABLET ORAL DAILY
Status: DISCONTINUED | OUTPATIENT
Start: 2025-06-11 | End: 2025-06-12 | Stop reason: HOSPADM

## 2025-06-11 RX ORDER — FOLIC ACID 1 MG/1
1000 TABLET ORAL DAILY
Status: CANCELLED | OUTPATIENT
Start: 2025-06-11

## 2025-06-11 RX ORDER — CYCLOBENZAPRINE HCL 10 MG
10 TABLET ORAL EVERY 12 HOURS PRN
Status: DISCONTINUED | OUTPATIENT
Start: 2025-06-11 | End: 2025-06-12 | Stop reason: HOSPADM

## 2025-06-11 RX ORDER — ONDANSETRON 2 MG/ML
4 INJECTION INTRAMUSCULAR; INTRAVENOUS EVERY 6 HOURS PRN
Status: DISCONTINUED | OUTPATIENT
Start: 2025-06-11 | End: 2025-06-12 | Stop reason: HOSPADM

## 2025-06-11 RX ORDER — FERROUS SULFATE 325(65) MG
325 TABLET, DELAYED RELEASE (ENTERIC COATED) ORAL DAILY
Status: DISCONTINUED | OUTPATIENT
Start: 2025-06-11 | End: 2025-06-11 | Stop reason: CLARIF

## 2025-06-11 RX ORDER — FENTANYL CITRATE 50 UG/ML
INJECTION, SOLUTION INTRAMUSCULAR; INTRAVENOUS
Status: DISCONTINUED | OUTPATIENT
Start: 2025-06-11 | End: 2025-06-11 | Stop reason: SDUPTHER

## 2025-06-11 RX ORDER — DEXMEDETOMIDINE HYDROCHLORIDE 100 UG/ML
INJECTION, SOLUTION INTRAVENOUS
Status: DISCONTINUED | OUTPATIENT
Start: 2025-06-11 | End: 2025-06-11 | Stop reason: SDUPTHER

## 2025-06-11 RX ORDER — UREA 10 %
45 LOTION (ML) TOPICAL DAILY
Status: CANCELLED | OUTPATIENT
Start: 2025-06-11

## 2025-06-11 RX ORDER — SODIUM CHLORIDE 9 MG/ML
INJECTION, SOLUTION INTRAVENOUS CONTINUOUS
Status: DISCONTINUED | OUTPATIENT
Start: 2025-06-11 | End: 2025-06-12 | Stop reason: HOSPADM

## 2025-06-11 RX ORDER — SODIUM CHLORIDE 9 MG/ML
INJECTION, SOLUTION INTRAVENOUS PRN
Status: DISCONTINUED | OUTPATIENT
Start: 2025-06-11 | End: 2025-06-11 | Stop reason: HOSPADM

## 2025-06-11 RX ORDER — SENNA AND DOCUSATE SODIUM 50; 8.6 MG/1; MG/1
1 TABLET, FILM COATED ORAL 2 TIMES DAILY
Status: CANCELLED | OUTPATIENT
Start: 2025-06-11

## 2025-06-11 RX ORDER — DIPHENHYDRAMINE HCL 25 MG
25 CAPSULE ORAL EVERY 6 HOURS PRN
Status: DISCONTINUED | OUTPATIENT
Start: 2025-06-11 | End: 2025-06-12 | Stop reason: HOSPADM

## 2025-06-11 RX ORDER — FENTANYL CITRATE 50 UG/ML
INJECTION, SOLUTION INTRAMUSCULAR; INTRAVENOUS
Status: COMPLETED
Start: 2025-06-11 | End: 2025-06-11

## 2025-06-11 RX ORDER — SODIUM CHLORIDE 9 MG/ML
INJECTION, SOLUTION INTRAVENOUS PRN
Status: DISCONTINUED | OUTPATIENT
Start: 2025-06-11 | End: 2025-06-12 | Stop reason: HOSPADM

## 2025-06-11 RX ORDER — FAMOTIDINE 20 MG/1
40 TABLET, FILM COATED ORAL
Status: CANCELLED | OUTPATIENT
Start: 2025-06-11

## 2025-06-11 RX ORDER — HYDROMORPHONE HYDROCHLORIDE 1 MG/ML
0.5 INJECTION, SOLUTION INTRAMUSCULAR; INTRAVENOUS; SUBCUTANEOUS
Refills: 0 | Status: CANCELLED | OUTPATIENT
Start: 2025-06-11

## 2025-06-11 RX ORDER — HYDROCODONE BITARTRATE AND ACETAMINOPHEN 5; 325 MG/1; MG/1
2 TABLET ORAL EVERY 4 HOURS PRN
Status: DISCONTINUED | OUTPATIENT
Start: 2025-06-11 | End: 2025-06-12 | Stop reason: HOSPADM

## 2025-06-11 RX ORDER — SUCCINYLCHOLINE/SOD CL,ISO/PF 200MG/10ML
SYRINGE (ML) INTRAVENOUS
Status: DISCONTINUED | OUTPATIENT
Start: 2025-06-11 | End: 2025-06-11 | Stop reason: SDUPTHER

## 2025-06-11 RX ORDER — FAMOTIDINE 20 MG/1
40 TABLET, FILM COATED ORAL
Status: DISCONTINUED | OUTPATIENT
Start: 2025-06-11 | End: 2025-06-11 | Stop reason: SDUPTHER

## 2025-06-11 RX ADMIN — FERROUS SULFATE TAB 325 MG (65 MG ELEMENTAL FE) 325 MG: 325 (65 FE) TAB at 14:54

## 2025-06-11 RX ADMIN — DEXAMETHASONE SODIUM PHOSPHATE 8 MG: 4 INJECTION, SOLUTION INTRAMUSCULAR; INTRAVENOUS at 09:13

## 2025-06-11 RX ADMIN — SODIUM CHLORIDE: 0.9 INJECTION, SOLUTION INTRAVENOUS at 18:12

## 2025-06-11 RX ADMIN — DEXMEDETOMIDINE 2 MCG: 100 INJECTION, SOLUTION INTRAVENOUS at 10:03

## 2025-06-11 RX ADMIN — CALCIUM 500 MG: 500 TABLET ORAL at 20:35

## 2025-06-11 RX ADMIN — POLYETHYLENE GLYCOL 3350 17 G: 17 POWDER, FOR SOLUTION ORAL at 14:54

## 2025-06-11 RX ADMIN — FENTANYL CITRATE 25 MCG: 50 INJECTION INTRAMUSCULAR; INTRAVENOUS at 09:31

## 2025-06-11 RX ADMIN — PROPOFOL 130 MG: 10 INJECTION, EMULSION INTRAVENOUS at 09:05

## 2025-06-11 RX ADMIN — EPHEDRINE SULFATE 5 MG: 50 INJECTION INTRAVENOUS at 09:58

## 2025-06-11 RX ADMIN — HYDROCODONE BITARTRATE AND ACETAMINOPHEN 2 TABLET: 5; 325 TABLET ORAL at 20:36

## 2025-06-11 RX ADMIN — DOCUSATE SODIUM 50 MG AND SENNOSIDES 8.6 MG 1 TABLET: 8.6; 5 TABLET, FILM COATED ORAL at 20:35

## 2025-06-11 RX ADMIN — FENTANYL CITRATE 25 MCG: 50 INJECTION, SOLUTION INTRAMUSCULAR; INTRAVENOUS at 11:04

## 2025-06-11 RX ADMIN — SODIUM CHLORIDE, PRESERVATIVE FREE 10 ML: 5 INJECTION INTRAVENOUS at 20:40

## 2025-06-11 RX ADMIN — FAMOTIDINE 20 MG: 20 TABLET, FILM COATED ORAL at 14:54

## 2025-06-11 RX ADMIN — SODIUM CHLORIDE, POTASSIUM CHLORIDE, SODIUM LACTATE AND CALCIUM CHLORIDE: 600; 310; 30; 20 INJECTION, SOLUTION INTRAVENOUS at 08:56

## 2025-06-11 RX ADMIN — FENTANYL CITRATE 25 MCG: 50 INJECTION, SOLUTION INTRAMUSCULAR; INTRAVENOUS at 11:21

## 2025-06-11 RX ADMIN — GLYCOPYRROLATE 0.1 MG: 0.2 INJECTION, SOLUTION INTRAMUSCULAR; INTRAVENOUS at 09:26

## 2025-06-11 RX ADMIN — PHENYLEPHRINE HYDROCHLORIDE 20 MCG/MIN: 10 INJECTION INTRAVENOUS at 09:21

## 2025-06-11 RX ADMIN — VANCOMYCIN HYDROCHLORIDE 1000 MG: 1 INJECTION, POWDER, LYOPHILIZED, FOR SOLUTION INTRAVENOUS at 08:45

## 2025-06-11 RX ADMIN — DOCUSATE SODIUM 50 MG AND SENNOSIDES 8.6 MG 1 TABLET: 8.6; 5 TABLET, FILM COATED ORAL at 14:54

## 2025-06-11 RX ADMIN — ONDANSETRON 4 MG: 2 INJECTION, SOLUTION INTRAMUSCULAR; INTRAVENOUS at 14:54

## 2025-06-11 RX ADMIN — DIPHENHYDRAMINE HYDROCHLORIDE 25 MG: 50 INJECTION INTRAMUSCULAR; INTRAVENOUS at 22:15

## 2025-06-11 RX ADMIN — ACETAMINOPHEN 1000 MG: 500 TABLET ORAL at 14:54

## 2025-06-11 RX ADMIN — ONDANSETRON 4 MG: 2 INJECTION, SOLUTION INTRAMUSCULAR; INTRAVENOUS at 10:03

## 2025-06-11 RX ADMIN — PROPOFOL 40 MG: 10 INJECTION, EMULSION INTRAVENOUS at 09:06

## 2025-06-11 RX ADMIN — FENTANYL CITRATE 25 MCG: 50 INJECTION, SOLUTION INTRAMUSCULAR; INTRAVENOUS at 10:50

## 2025-06-11 RX ADMIN — LIDOCAINE HYDROCHLORIDE 60 MG: 20 INJECTION, SOLUTION EPIDURAL; INFILTRATION; INTRACAUDAL; PERINEURAL at 09:05

## 2025-06-11 RX ADMIN — FENTANYL CITRATE 25 MCG: 50 INJECTION INTRAMUSCULAR; INTRAVENOUS at 10:15

## 2025-06-11 RX ADMIN — THERA TABS 1 TABLET: TAB at 20:36

## 2025-06-11 RX ADMIN — MAGNESIUM SULFATE HEPTAHYDRATE 2000 MG: 40 INJECTION, SOLUTION INTRAVENOUS at 09:18

## 2025-06-11 RX ADMIN — PROPOFOL 10 MG: 10 INJECTION, EMULSION INTRAVENOUS at 10:08

## 2025-06-11 RX ADMIN — DEXMEDETOMIDINE 2 MCG: 100 INJECTION, SOLUTION INTRAVENOUS at 09:05

## 2025-06-11 RX ADMIN — HYDROMORPHONE HYDROCHLORIDE 0.5 MG: 1 INJECTION, SOLUTION INTRAMUSCULAR; INTRAVENOUS; SUBCUTANEOUS at 11:37

## 2025-06-11 RX ADMIN — ROCURONIUM BROMIDE 5 MG: 10 INJECTION, SOLUTION INTRAVENOUS at 09:05

## 2025-06-11 RX ADMIN — FENTANYL CITRATE 50 MCG: 50 INJECTION INTRAMUSCULAR; INTRAVENOUS at 09:05

## 2025-06-11 RX ADMIN — PROPOFOL 20 MG: 10 INJECTION, EMULSION INTRAVENOUS at 10:11

## 2025-06-11 RX ADMIN — Medication 60 MCG: at 09:14

## 2025-06-11 RX ADMIN — CALCIUM 500 MG: 500 TABLET ORAL at 14:55

## 2025-06-11 RX ADMIN — HYDROCODONE BITARTRATE AND ACETAMINOPHEN 2 TABLET: 5; 325 TABLET ORAL at 15:42

## 2025-06-11 RX ADMIN — HYDROMORPHONE HYDROCHLORIDE 0.5 MG: 1 INJECTION, SOLUTION INTRAMUSCULAR; INTRAVENOUS; SUBCUTANEOUS at 13:00

## 2025-06-11 RX ADMIN — Medication 100 MG: at 09:06

## 2025-06-11 RX ADMIN — VANCOMYCIN HYDROCHLORIDE 1000 MG: 1 INJECTION, POWDER, LYOPHILIZED, FOR SOLUTION INTRAVENOUS at 20:38

## 2025-06-11 RX ADMIN — Medication 2000 UNITS: at 14:54

## 2025-06-11 RX ADMIN — Medication 1000 MCG: at 14:55

## 2025-06-11 ASSESSMENT — PAIN SCALES - GENERAL
PAINLEVEL_OUTOF10: 7
PAINLEVEL_OUTOF10: 6
PAINLEVEL_OUTOF10: 7
PAINLEVEL_OUTOF10: 5

## 2025-06-11 ASSESSMENT — PAIN DESCRIPTION - PAIN TYPE: TYPE: SURGICAL PAIN

## 2025-06-11 ASSESSMENT — PAIN DESCRIPTION - LOCATION
LOCATION: BACK

## 2025-06-11 ASSESSMENT — PAIN DESCRIPTION - DESCRIPTORS
DESCRIPTORS: ACHING;DISCOMFORT
DESCRIPTORS: ACHING;DISCOMFORT
DESCRIPTORS: ACHING

## 2025-06-11 ASSESSMENT — PAIN DESCRIPTION - FREQUENCY: FREQUENCY: CONTINUOUS

## 2025-06-11 ASSESSMENT — PAIN DESCRIPTION - ONSET: ONSET: ON-GOING

## 2025-06-11 NOTE — ANESTHESIA PRE PROCEDURE
\"HCGQUANT\"     ABGs: No results found for: \"PHART\", \"PO2ART\", \"QQE8MDX\", \"GZU6MOR\", \"BEART\", \"B3ZQNXBI\"     Type & Screen (If Applicable):  Lab Results   Component Value Date    ABORH O POSITIVE 06/04/2025    LABANTI NEG 06/04/2025       Drug/Infectious Status (If Applicable):  No results found for: \"HIV\", \"HEPCAB\"    COVID-19 Screening (If Applicable):   Lab Results   Component Value Date/Time    COVID19 Not detected 11/30/2023 08:42 AM           Anesthesia Evaluation  Patient summary reviewed and Nursing notes reviewed   no history of anesthetic complications (Discussed drug allergies with pharmacist - said ok to use all pressors and to test dose Ancef):   Airway: Mallampati: II  TM distance: >3 FB   Neck ROM: full  Mouth opening: > = 3 FB   Dental:      Comment: Small maxillary tory    Pulmonary:normal exam  breath sounds clear to auscultation  (+)  COPD:                                     Cardiovascular:Negative CV ROS  Exercise tolerance: good (>4 METS)          Rhythm: regular  Rate: normal           Beta Blocker:  Not on Beta Blocker         Neuro/Psych:   (+) psychiatric history:            GI/Hepatic/Renal:   (+) GERD: well controlled          Endo/Other:                      ROS comment: History of VC dysfunction. Per ENT note in chart and patient, no current worries regarding any active dysfunction. Denies any breathing problems, SOB, dyspnea, recent spastic episodes Abdominal: normal exam            Vascular: negative vascular ROS.         Other Findings:       Anesthesia Plan      general     ASA 2       Induction: intravenous.    MIPS: Postoperative opioids intended and Prophylactic antiemetics administered.  Anesthetic plan and risks discussed with patient.    Use of blood products discussed with patient whom consented to blood products.    Plan discussed with CRNA.    Attending anesthesiologist reviewed and agrees with Preprocedure content            Serene Marcum DO   6/11/2025

## 2025-06-11 NOTE — ANESTHESIA POSTPROCEDURE EVALUATION
Post-Anesthesia Evaluation and Assessment    Patient: Aundrea Simon MRN: [id number]  SSN: [id number]    YOB: 1940  Age: 85 y.o.  Sex: female      I have evaluated the patient and they are stable and ready for discharge from the PACU.     Cardiovascular Function/Vital Signs  Visit Vitals  BP (!) 111/92   Pulse 68   Temp 97.1 °F (36.2 °C) (Axillary)   Resp 14   SpO2 100%       Patient is status post General anesthesia for Procedure(s):  L5/S1 POSTERIOR LUMBAR LAMINECTOMY.    Nausea/Vomiting: None    Postoperative hydration reviewed and adequate.    Pain:  Managed    Neurological Status:   At baseline    Mental Status, Level of Consciousness: Alert and  oriented to person, place, and time    Pulmonary Status:   Adequate oxygenation and airway patent    Complications related to anesthesia: None    Post-anesthesia assessment completed. No concerns    Signed By: Serene Marcum DO     June 11, 2025

## 2025-06-11 NOTE — BRIEF OP NOTE
Brief Postoperative Note      Patient: Aundrea Simon  YOB: 1940  MRN: [id number]    Date of Procedure: 6/11/2025    Pre-Op Diagnosis Codes:      * Spinal stenosis, lumbar region, without neurogenic claudication [M48.061]    Post-Op Diagnosis: Same       Procedure(s):  L5/S1 POSTERIOR LUMBAR LAMINECTOMY    Surgeon(s):  Roman Santiago MD    Assistant:  Physician Assistant: Marcia Rome PA-C    Anesthesia: General    Estimated Blood Loss (mL): Minimal    Complications: None    Specimens:   * No specimens in log *    Implants:  * No implants in log *      Drains: * No LDAs found *    Findings:  Infection Present At Time Of Surgery (PATOS) (choose all levels that have infection present):  No infection present  Other Findings: Stenosis    Electronically signed by Marcia Rome PA-C on 6/11/2025 at 10:13 AM

## 2025-06-11 NOTE — OP NOTE
[address]  [address]                            OPERATIVE REPORT      PATIENT NAME: HONEY WAYNE               : 1940  MED REC NO: [id number]                       ROOM: OR  ACCOUNT NO: [id number]                       ADMIT DATE: 2025  PROVIDER: Roman Santiago MD    DATE OF SERVICE:  2025    PREOPERATIVE DIAGNOSES:  Lumbar stenosis with right lumbar radiculopathy, L5-S1.    POSTOPERATIVE DIAGNOSES:  Lumbar stenosis with right lumbar radiculopathy, L5-S1.    PROCEDURES PERFORMED:  Lumbar laminectomy with partial facetectomy, decompression of L5-S1 nerve root.    SURGEON:  Roman Santiago MD    ASSISTANT:  Marcia Rome PA-C    ANESTHESIA:  General anesthesia.    ESTIMATED BLOOD LOSS:  Minimal.    SPECIMENS REMOVED:  None    INTRAOPERATIVE FINDINGS:  Included lumbar stenosis L5-S1.     COMPLICATIONS:  None.    IMPLANTS:  No instrumentation.    INDICATIONS:  This is a pleasant 85-year-old female with acute on chronic history of right-sided lower back and leg pain from lumbar stenosis from facet hypertrophy, ligamentum hypertrophy L5-S1.  She has had conservative treatment with persistent symptoms.  She is for limited decompression L5-S1.    DESCRIPTION OF PROCEDURE:  The patient was identified, brought to the operative suite, and underwent general anesthesia without difficulty.  Perioperative antibiotics were given to the patient.  The patient was placed prone on the Noah frame with all bony prominences well padded.  Back was prepped and draped sterilely.  At which time, after prepping and draping, time-out was confirmed.  We made a sharp incision directly over the L5-S1 disk space.  Dissection was carried down to the thoracodorsal fascia.  We exposed the lamina of L5 and S1 bilaterally.  Confirmed this on lateral fluoroscopy.  After that, we took down the interspinous ligament and then started laminectomy

## 2025-06-12 VITALS
RESPIRATION RATE: 16 BRPM | DIASTOLIC BLOOD PRESSURE: 59 MMHG | HEART RATE: 67 BPM | SYSTOLIC BLOOD PRESSURE: 97 MMHG | TEMPERATURE: 97.7 F | OXYGEN SATURATION: 96 %

## 2025-06-12 PROCEDURE — G0378 HOSPITAL OBSERVATION PER HR: HCPCS

## 2025-06-12 PROCEDURE — 97530 THERAPEUTIC ACTIVITIES: CPT

## 2025-06-12 PROCEDURE — 2580000003 HC RX 258

## 2025-06-12 PROCEDURE — 97535 SELF CARE MNGMENT TRAINING: CPT

## 2025-06-12 PROCEDURE — 97165 OT EVAL LOW COMPLEX 30 MIN: CPT

## 2025-06-12 PROCEDURE — 6370000000 HC RX 637 (ALT 250 FOR IP)

## 2025-06-12 PROCEDURE — 97116 GAIT TRAINING THERAPY: CPT

## 2025-06-12 PROCEDURE — L0628 LSO FLEX NO RI STAYS PRE OTS: HCPCS

## 2025-06-12 RX ORDER — HYDROCODONE BITARTRATE AND ACETAMINOPHEN 5; 325 MG/1; MG/1
1-2 TABLET ORAL
Qty: 50 TABLET | Refills: 0 | Status: SHIPPED | OUTPATIENT
Start: 2025-06-12 | End: 2025-06-19

## 2025-06-12 RX ADMIN — CALCIUM 500 MG: 500 TABLET ORAL at 08:52

## 2025-06-12 RX ADMIN — Medication 2000 UNITS: at 08:52

## 2025-06-12 RX ADMIN — POLYETHYLENE GLYCOL 3350 17 G: 17 POWDER, FOR SOLUTION ORAL at 08:51

## 2025-06-12 RX ADMIN — FERROUS SULFATE TAB 325 MG (65 MG ELEMENTAL FE) 325 MG: 325 (65 FE) TAB at 08:52

## 2025-06-12 RX ADMIN — HYDROCODONE BITARTRATE AND ACETAMINOPHEN 2 TABLET: 5; 325 TABLET ORAL at 09:40

## 2025-06-12 RX ADMIN — DOCUSATE SODIUM 50 MG AND SENNOSIDES 8.6 MG 1 TABLET: 8.6; 5 TABLET, FILM COATED ORAL at 08:51

## 2025-06-12 RX ADMIN — SODIUM CHLORIDE: 0.9 INJECTION, SOLUTION INTRAVENOUS at 02:33

## 2025-06-12 RX ADMIN — Medication 1000 MCG: at 08:52

## 2025-06-12 RX ADMIN — HYDROCODONE BITARTRATE AND ACETAMINOPHEN 2 TABLET: 5; 325 TABLET ORAL at 14:58

## 2025-06-12 RX ADMIN — FAMOTIDINE 20 MG: 20 TABLET, FILM COATED ORAL at 08:52

## 2025-06-12 RX ADMIN — HYDROCODONE BITARTRATE AND ACETAMINOPHEN 2 TABLET: 5; 325 TABLET ORAL at 05:20

## 2025-06-12 ASSESSMENT — PAIN SCALES - GENERAL
PAINLEVEL_OUTOF10: 7
PAINLEVEL_OUTOF10: 7
PAINLEVEL_OUTOF10: 8

## 2025-06-12 ASSESSMENT — PAIN DESCRIPTION - ORIENTATION
ORIENTATION: MID
ORIENTATION: LOWER

## 2025-06-12 ASSESSMENT — PAIN DESCRIPTION - DESCRIPTORS
DESCRIPTORS: ACHING
DESCRIPTORS: ACHING

## 2025-06-12 ASSESSMENT — PAIN DESCRIPTION - LOCATION
LOCATION: BACK
LOCATION: BACK

## 2025-06-12 NOTE — PLAN OF CARE
Problem: Pain  Goal: Verbalizes/displays adequate comfort level or baseline comfort level  Outcome: Progressing     
  Problem: Physical Therapy - Adult  Goal: By Discharge: Performs mobility at highest level of function for planned discharge setting.  See evaluation for individualized goals.  Description: FUNCTIONAL STATUS PRIOR TO ADMISSION: Patient was independent and active without use of DME.    HOME SUPPORT PRIOR TO ADMISSION: The patient lived alone with no local support. Daughters to stay with patient for approx 1 week    Physical Therapy Goals  Initiated 6/11/2025  1.  Patient will move from supine to sit and sit to supine, scoot up and down, and roll side to side in bed with independence within 4 day(s).    2.  Patient will perform sit to stand with modified independence within 4 day(s).  3.  Patient will transfer from bed to chair and chair to bed with modified independence using the least restrictive device within 4 day(s).  4.  Patient will ambulate with modified independence for 300 feet with the least restrictive device within 4 day(s).   5.  Patient will ascend/descend 6 stairs with 1 handrail(s) with supervision/set-up within 4 day(s).  6. Patient will verbalize and demonstrate understanding of spinal precautions (No bending, lifting greater than 5 lbs, or twisting; log-roll technique; frequent repositioning as instructed) within 4 days.    6/12/2025 1208 by Freddie Wong, PT  Outcome: Progressing     
  Problem: Physical Therapy - Adult  Goal: By Discharge: Performs mobility at highest level of function for planned discharge setting.  See evaluation for individualized goals.  Description: FUNCTIONAL STATUS PRIOR TO ADMISSION: Patient was independent and active without use of DME.    HOME SUPPORT PRIOR TO ADMISSION: The patient lived alone with no local support. Daughters to stay with patient for approx 1 week    Physical Therapy Goals  Initiated 6/11/2025  1.  Patient will move from supine to sit and sit to supine, scoot up and down, and roll side to side in bed with independence within 4 day(s).    2.  Patient will perform sit to stand with modified independence within 4 day(s).  3.  Patient will transfer from bed to chair and chair to bed with modified independence using the least restrictive device within 4 day(s).  4.  Patient will ambulate with modified independence for 300 feet with the least restrictive device within 4 day(s).   5.  Patient will ascend/descend 6 stairs with 1 handrail(s) with supervision/set-up within 4 day(s).  6. Patient will verbalize and demonstrate understanding of spinal precautions (No bending, lifting greater than 5 lbs, or twisting; log-roll technique; frequent repositioning as instructed) within 4 days.    Outcome: Progressing       PHYSICAL THERAPY TREATMENT    Patient: Aundrea Simon (85 y.o. female)  Date: 6/12/2025  Diagnosis: Spinal stenosis, lumbar region, without neurogenic claudication [M48.061]  Lumbar stenosis with neurogenic claudication [M48.062] Spinal stenosis, lumbar region, without neurogenic claudication  Procedure(s) (LRB):  L5/S1 POSTERIOR LUMBAR LAMINECTOMY (N/A) 1 Day Post-Op  Precautions:              ASSESSMENT:  Patient continues to benefit from skilled PT services and is progressing towards goals. Pt generally mobilized at a Mod I to S level during today's session. Pt received sitting in bedside chair, on RA, family present, and agreeable to work with 
  Problem: Safety - Adult  Goal: Free from fall injury  Outcome: Progressing     
                                                                                                   Intervention/Education specific to: \"Spinal fusion or laminectomy\"    Educated on and reviewed log roll technique for bed mobility.  The patient stated 3/3 spinal precautions when prompted. Reviewed all 3 precautions, encouraged gait as tolerated at discharge, and discussed sitting for approximately 30 minutes before repositioning.                                                                                                                                                                                                                                                                                                                                                                                                                                                               [facility] AM-PAC®      Basic Mobility Inpatient Short Form (6-Clicks) Version 2  How much HELP from another person do you currently need... (If the patient hasn't done an activity recently, how much help from another person do you think they would need if they tried?) Total A Lot A Little None   1.  Turning from your back to your side while in a flat bed without using bedrails? []  1 []  2 []  3  [x]  4   2.  Moving from lying on your back to sitting on the side of a flat bed without using bedrails? []  1 []  2 []  3  [x]  4   3.  Moving to and from a bed to a chair (including a wheelchair)? []  1 []  2 [x]  3  []  4   4. Standing up from a chair using your arms (e.g. wheelchair or bedside chair)? []  1 []  2 [x]  3  []  4   5.  Walking in hospital room? []  1 [x]  2 []  3  []  4   6.  Climbing 3-5 steps with a railing? [x]  1 []  2 []  3  []  4     Raw Score: 17/24                            Cutoff score <=171,2,3 had higher odds of discharging home with home health or need of SNF/IPR.    1. Keya Cason, Liza Plaza, Preet Webb,

## 2025-06-12 NOTE — PROGRESS NOTES
Pharmacy Note - Renal dose adjustment made per P/T protocol    Original order:  Famotidine 20mg PO/IV BID     Estimated Creatinine Clearance: 47 mL/min (based on SCr of 0.69 mg/dL).    Renally adjusted order:  Famotidine 20mg PO/IV daily for CrCl less than 50mL/min     Please call pharmacy with any questions.    Thank you,  Mayra Rand [facility]  6/11/2025 2:07 PM  
150 mL given of vancomycin. Stopped d/t facial swelling. Benadryl Administered.   
Attempted to deliver and verbally explain the MOON/VOON with patient. Patient was with clinical staff. Jazlyn Escamilla, Care Management Assistant  
intake/output data recorded.  06/10 1901 - 06/12 0700  In: 550 [I.V.:500]  Out: 265 [Urine:250]    PT/OT:   Gait:                          Signed By: Marcia Rome PA-C    
Low

## 2025-06-12 NOTE — CARE COORDINATION
Transition of Care Plan:    RUR: N/A   Prior Level of Functioning: Independent   Disposition: Home with Family Assistance and Home Health   Home Health; Accepted   At Home Care   Preferred Choice.  The pt reported that her daughter plans to stay with her for the next two weeks.   BRANDIE: Today   Follow up appointments: PCP  DME needed: None   Transportation at discharge: Family   IM/IMM Medicare/ letter given: N/A   Caregiver Contact: Angie Simon (Child)  [phone]   Discharge Caregiver contacted prior to discharge? Y  Care Conference needed? N     06/12/25 1520   Discharge Planning   Patient expects to be discharged to: Apartment   Services At/After Discharge   Transition of Care Consult (CM Consult) Discharge Planning;Home Health   Internal Home Health No   Reason Outside Agency Chosen Unable to staff case   Mode of Transport at Discharge Other (see comment)   Confirm Follow Up Transport Family   Condition of Participation: Discharge Planning   The Plan for Transition of Care is related to the following treatment goals: Home with Family Assistance and Home Health   The Patient and/or Patient Representative was provided with a Choice of Provider? Patient   The Patient and/Or Patient Representative agree with the Discharge Plan? Yes   Freedom of Choice list was provided with basic dialogue that supports the patient's individualized plan of care/goals, treatment preferences, and shares the quality data associated with the providers?  Yes

## 2025-06-15 ENCOUNTER — TRANSCRIBE ORDERS (OUTPATIENT)
Facility: HOSPITAL | Age: 85
End: 2025-06-15

## 2025-06-15 DIAGNOSIS — H53.123 TRANSIENT VISUAL LOSS OF BOTH EYES: Primary | ICD-10-CM

## 2025-07-09 ENCOUNTER — HOSPITAL ENCOUNTER (EMERGENCY)
Facility: HOSPITAL | Age: 85
Discharge: HOME | End: 2025-07-09
Attending: STUDENT IN AN ORGANIZED HEALTH CARE EDUCATION/TRAINING PROGRAM
Payer: MEDICARE

## 2025-07-09 ENCOUNTER — APPOINTMENT (OUTPATIENT)
Facility: HOSPITAL | Age: 85
End: 2025-07-09
Payer: MEDICARE

## 2025-07-09 VITALS
WEIGHT: 136 LBS | SYSTOLIC BLOOD PRESSURE: 98 MMHG | HEIGHT: 62 IN | DIASTOLIC BLOOD PRESSURE: 55 MMHG | OXYGEN SATURATION: 98 % | HEART RATE: 95 BPM | TEMPERATURE: 98.4 F | RESPIRATION RATE: 18 BRPM | BODY MASS INDEX: 25.03 KG/M2

## 2025-07-09 DIAGNOSIS — Z98.890 HISTORY OF LUMBAR SURGERY: Primary | ICD-10-CM

## 2025-07-09 DIAGNOSIS — R20.2 PARESTHESIA OF RIGHT LEG: ICD-10-CM

## 2025-07-09 DIAGNOSIS — R06.02 SHORTNESS OF BREATH: ICD-10-CM

## 2025-07-09 LAB
ALBUMIN SERPL-MCNC: 3.8 G/DL (ref 3.5–5)
ALBUMIN/GLOB SERPL: 1.1 (ref 1.1–2.2)
ALP SERPL-CCNC: 68 U/L (ref 45–117)
ALT SERPL-CCNC: 21 U/L (ref 12–78)
ANION GAP SERPL CALC-SCNC: 5 MMOL/L (ref 2–12)
APPEARANCE UR: CLEAR
AST SERPL-CCNC: 19 U/L (ref 15–37)
BACTERIA URNS QL MICRO: NEGATIVE /HPF
BASOPHILS # BLD: 0.04 K/UL (ref 0–0.1)
BASOPHILS NFR BLD: 0.8 % (ref 0–1)
BILIRUB SERPL-MCNC: 0.4 MG/DL (ref 0.2–1)
BILIRUB UR QL: NEGATIVE
BUN SERPL-MCNC: 10 MG/DL (ref 6–20)
BUN/CREAT SERPL: 13 (ref 12–20)
CALCIUM SERPL-MCNC: 9.9 MG/DL (ref 8.5–10.1)
CHLORIDE SERPL-SCNC: 98 MMOL/L (ref 97–108)
CO2 SERPL-SCNC: 28 MMOL/L (ref 21–32)
COLOR UR: NORMAL
COMMENT:: NORMAL
CREAT SERPL-MCNC: 0.77 MG/DL (ref 0.55–1.02)
D DIMER PPP FEU-MCNC: 0.31 MG/L FEU (ref 0–0.65)
DIFFERENTIAL METHOD BLD: ABNORMAL
EOSINOPHIL # BLD: 0.5 K/UL (ref 0–0.4)
EOSINOPHIL NFR BLD: 10.1 % (ref 0–7)
EPITH CASTS URNS QL MICRO: NORMAL /LPF
ERYTHROCYTE [DISTWIDTH] IN BLOOD BY AUTOMATED COUNT: 14 % (ref 11.5–14.5)
GLOBULIN SER CALC-MCNC: 3.5 G/DL (ref 2–4)
GLUCOSE SERPL-MCNC: 118 MG/DL (ref 65–100)
GLUCOSE UR STRIP.AUTO-MCNC: NEGATIVE MG/DL
HCT VFR BLD AUTO: 36.5 % (ref 35–47)
HGB BLD-MCNC: 11.8 G/DL (ref 11.5–16)
HGB UR QL STRIP: NEGATIVE
IMM GRANULOCYTES # BLD AUTO: 0.05 K/UL (ref 0–0.04)
IMM GRANULOCYTES NFR BLD AUTO: 1 % (ref 0–0.5)
KETONES UR QL STRIP.AUTO: NEGATIVE MG/DL
LEUKOCYTE ESTERASE UR QL STRIP.AUTO: NEGATIVE
LYMPHOCYTES # BLD: 1.14 K/UL (ref 0.8–3.5)
LYMPHOCYTES NFR BLD: 22.9 % (ref 12–49)
MCH RBC QN AUTO: 30 PG (ref 26–34)
MCHC RBC AUTO-ENTMCNC: 32.3 G/DL (ref 30–36.5)
MCV RBC AUTO: 92.9 FL (ref 80–99)
MONOCYTES # BLD: 0.54 K/UL (ref 0–1)
MONOCYTES NFR BLD: 10.9 % (ref 5–13)
NEUTS SEG # BLD: 2.7 K/UL (ref 1.8–8)
NEUTS SEG NFR BLD: 54.3 % (ref 32–75)
NITRITE UR QL STRIP.AUTO: NEGATIVE
NRBC # BLD: 0 K/UL (ref 0–0.01)
NRBC BLD-RTO: 0 PER 100 WBC
PH UR STRIP: 7 (ref 5–8)
PLATELET # BLD AUTO: 209 K/UL (ref 150–400)
PMV BLD AUTO: 10.5 FL (ref 8.9–12.9)
POTASSIUM SERPL-SCNC: 3.7 MMOL/L (ref 3.5–5.1)
PROT SERPL-MCNC: 7.3 G/DL (ref 6.4–8.2)
PROT UR STRIP-MCNC: NEGATIVE MG/DL
RBC # BLD AUTO: 3.93 M/UL (ref 3.8–5.2)
RBC #/AREA URNS HPF: NORMAL /HPF (ref 0–5)
SODIUM SERPL-SCNC: 131 MMOL/L (ref 136–145)
SP GR UR REFRACTOMETRY: <1.005 (ref 1–1.03)
SPECIMEN HOLD: NORMAL
SPECIMEN HOLD: NORMAL
TROPONIN I SERPL HS-MCNC: 7 NG/L (ref 0–51)
UROBILINOGEN UR QL STRIP.AUTO: 0.2 EU/DL (ref 0.2–1)
WBC # BLD AUTO: 5 K/UL (ref 3.6–11)
WBC URNS QL MICRO: NORMAL /HPF (ref 0–4)

## 2025-07-09 PROCEDURE — 71046 X-RAY EXAM CHEST 2 VIEWS: CPT

## 2025-07-09 PROCEDURE — 81001 URINALYSIS AUTO W/SCOPE: CPT

## 2025-07-09 PROCEDURE — 84484 ASSAY OF TROPONIN QUANT: CPT

## 2025-07-09 PROCEDURE — 99285 EMERGENCY DEPT VISIT HI MDM: CPT

## 2025-07-09 PROCEDURE — 93005 ELECTROCARDIOGRAM TRACING: CPT | Performed by: STUDENT IN AN ORGANIZED HEALTH CARE EDUCATION/TRAINING PROGRAM

## 2025-07-09 PROCEDURE — 80053 COMPREHEN METABOLIC PANEL: CPT

## 2025-07-09 PROCEDURE — 85025 COMPLETE CBC W/AUTO DIFF WBC: CPT

## 2025-07-09 PROCEDURE — 85379 FIBRIN DEGRADATION QUANT: CPT

## 2025-07-09 ASSESSMENT — PAIN - FUNCTIONAL ASSESSMENT
PAIN_FUNCTIONAL_ASSESSMENT: PREVENTS OR INTERFERES SOME ACTIVE ACTIVITIES AND ADLS
PAIN_FUNCTIONAL_ASSESSMENT: NONE - DENIES PAIN

## 2025-07-09 ASSESSMENT — PAIN DESCRIPTION - ONSET: ONSET: ON-GOING

## 2025-07-09 ASSESSMENT — PAIN DESCRIPTION - LOCATION: LOCATION: LEG

## 2025-07-09 ASSESSMENT — PAIN DESCRIPTION - ORIENTATION: ORIENTATION: RIGHT

## 2025-07-09 ASSESSMENT — PAIN DESCRIPTION - PAIN TYPE: TYPE: ACUTE PAIN

## 2025-07-09 ASSESSMENT — ENCOUNTER SYMPTOMS: SHORTNESS OF BREATH: 1

## 2025-07-09 ASSESSMENT — PAIN DESCRIPTION - DESCRIPTORS: DESCRIPTORS: OTHER (COMMENT)

## 2025-07-09 ASSESSMENT — PAIN DESCRIPTION - FREQUENCY: FREQUENCY: INTERMITTENT

## 2025-07-09 ASSESSMENT — PAIN SCALES - GENERAL: PAINLEVEL_OUTOF10: 2

## 2025-07-09 NOTE — ED TRIAGE NOTES
Patient arrives via wheelchair to triage with c/o right leg and left foot numbness worsening for the last four days. She had a lumbar laminectomy on 6/11 and has been experiencing the numbness since. She also endorses shortness of breath that started about 30 minutes ago

## 2025-07-10 LAB
EKG ATRIAL RATE: 75 BPM
EKG DIAGNOSIS: NORMAL
EKG P AXIS: 72 DEGREES
EKG P-R INTERVAL: 160 MS
EKG Q-T INTERVAL: 398 MS
EKG QRS DURATION: 124 MS
EKG QTC CALCULATION (BAZETT): 444 MS
EKG R AXIS: 94 DEGREES
EKG T AXIS: 55 DEGREES
EKG VENTRICULAR RATE: 75 BPM

## 2025-07-10 PROCEDURE — 93010 ELECTROCARDIOGRAM REPORT: CPT | Performed by: INTERNAL MEDICINE

## 2025-07-10 NOTE — ED NOTES
Numbness on and off in the right leg and left foot increasing in intensity and duration for 4 days.  Stenosis in the spine on the right side, had surgical procedure on 6/11, surgeon told her to come to ED for symptoms  Feels like a \"freezer burn\"  SOB started 30 minutes ago  No history of blood clots     Beatrice Hicks PA-C  07/09/25 3616    
Report given to MAUREEN Chase  
136 - 145 mmol/L    Potassium 3.7 3.5 - 5.1 mmol/L    Chloride 98 97 - 108 mmol/L    CO2 28 21 - 32 mmol/L    Anion Gap 5 2 - 12 mmol/L    Glucose 118 (H) 65 - 100 mg/dL    BUN 10 6 - 20 MG/DL    Creatinine 0.77 0.55 - 1.02 MG/DL    BUN/Creatinine Ratio 13 12 - 20      Est, Glom Filt Rate 76 >60 ml/min/1.73m2    Calcium 9.9 8.5 - 10.1 MG/DL    Total Bilirubin 0.4 0.2 - 1.0 MG/DL    ALT 21 12 - 78 U/L    AST 19 15 - 37 U/L    Alk Phosphatase 68 45 - 117 U/L    Total Protein 7.3 6.4 - 8.2 g/dL    Albumin 3.8 3.5 - 5.0 g/dL    Globulin 3.5 2.0 - 4.0 g/dL    Albumin/Globulin Ratio 1.1 1.1 - 2.2     D-Dimer, Quantitative    Collection Time: 07/09/25  5:52 PM   Result Value Ref Range    D-Dimer, Quant 0.31 0.00 - 0.65 mg/L FEU   Troponin    Collection Time: 07/09/25  5:52 PM   Result Value Ref Range    Troponin, High Sensitivity 7 0 - 51 ng/L   EKG 12 Lead    Collection Time: 07/09/25  5:52 PM   Result Value Ref Range    Ventricular Rate 75 BPM    Atrial Rate 75 BPM    P-R Interval 160 ms    QRS Duration 124 ms    Q-T Interval 398 ms    QTc Calculation (Bazett) 444 ms    P Axis 72 degrees    R Axis 94 degrees    T Axis 55 degrees    Diagnosis       Normal sinus rhythm  Right bundle branch block  Abnormal ECG  When compared with ECG of 13-APR-2025 01:10,  No significant change was found     Urinalysis with Microscopic    Collection Time: 07/09/25  7:58 PM   Result Value Ref Range    Color, UA YELLOW/STRAW      Appearance CLEAR CLEAR      Specific Gravity, UA <1.005 1.003 - 1.030    pH, Urine 7.0 5.0 - 8.0      Protein, UA Negative NEG mg/dL    Glucose, Ur Negative NEG mg/dL    Ketones, Urine Negative NEG mg/dL    Bilirubin, Urine Negative NEG      Blood, Urine Negative NEG      Urobilinogen, Urine 0.2 0.2 - 1.0 EU/dL    Nitrite, Urine Negative NEG      Leukocyte Esterase, Urine Negative NEG      WBC, UA 0-4 0 - 4 /hpf    RBC, UA 0-5 0 - 5 /hpf    Epithelial Cells, UA FEW FEW /lpf    BACTERIA, URINE Negative NEG /hpf

## 2025-07-10 NOTE — ED PROVIDER NOTES
[facility] EMERGENCY DEPARTMENT  EMERGENCY DEPARTMENT ENCOUNTER      Pt Name: Aundrea Simon  MRN: [id number]  Birthdate 1940  Date of evaluation: 7/9/2025  Provider: Ryan Mayo DO    CHIEF COMPLAINT       Chief Complaint   Patient presents with    Numbness         HISTORY OF PRESENT ILLNESS   (Location/Symptom, Timing/Onset, Context/Setting, Quality, Duration, Modifying Factors, Severity)  Note limiting factors.   85-year-old female presents today with numbness to the right lower extremity numbness.  She had a an L5-S1 posterior laminectomy on June 21.  Had some tingling to the left leg initially but was put on steroids which she did not think helped too much but is not bothering her significantly at this time.  Her biggest concern is that over the past 4 days she has developed numbness in the right lower extremity.  She states that anytime she lies flat she develops numbness to toes 2 through 5 on the right foot followed by right lateral foot numbness that then spreads to the plantar surface of the foot and then up the lateral aspect of the right leg.  When she stands up she develops a burning pain in that same distribution and then the symptoms resolve after a few hours.  She denies new weakness.  She states that she has some incisional pain but this is baseline and unchanged.  She has not had fevers.  No bowel or bladder incontinence.  No urinary symptoms.  She states that she talked to her surgeon's team on the phone today and they recommended she come in for a DVT study to rule out DVT because of the leg pain.  She also reports that she has had issues with low blood pressure since the surgery (although blood pressure is fine here) as well as a bout of shortness of breath earlier this evening.  She is not currently short of breath.  She has no chest pain.            Review of External Medical Records:     Nursing Notes were reviewed.    REVIEW OF SYSTEMS    (2-9 systems for level 4, 10 or

## 2025-07-10 NOTE — DISCHARGE INSTRUCTIONS
RETURN IF WORSENING PAIN, TROUBLE HOLDING STOOL/URINE, RADIATION OF PAIN, OR WORSENING WEAKNESS/NUMBNESS

## 2025-07-28 ENCOUNTER — OFFICE VISIT (OUTPATIENT)
Age: 85
End: 2025-07-28
Payer: MEDICARE

## 2025-07-28 VITALS
HEART RATE: 77 BPM | SYSTOLIC BLOOD PRESSURE: 100 MMHG | HEIGHT: 62 IN | DIASTOLIC BLOOD PRESSURE: 70 MMHG | OXYGEN SATURATION: 95 % | WEIGHT: 128.6 LBS | BODY MASS INDEX: 23.66 KG/M2

## 2025-07-28 DIAGNOSIS — I45.10 RBBB: Primary | ICD-10-CM

## 2025-07-28 DIAGNOSIS — M48.061 SPINAL STENOSIS OF LUMBAR REGION, UNSPECIFIED WHETHER NEUROGENIC CLAUDICATION PRESENT: ICD-10-CM

## 2025-07-28 DIAGNOSIS — R06.09 DOE (DYSPNEA ON EXERTION): ICD-10-CM

## 2025-07-28 DIAGNOSIS — Z98.890 STATUS POST LUMBAR LAMINECTOMY: ICD-10-CM

## 2025-07-28 PROCEDURE — 99214 OFFICE O/P EST MOD 30 MIN: CPT | Performed by: INTERNAL MEDICINE

## 2025-07-28 RX ORDER — UBIDECARENONE 75 MG
50 CAPSULE ORAL DAILY
COMMUNITY

## 2025-07-28 ASSESSMENT — PATIENT HEALTH QUESTIONNAIRE - PHQ9
SUM OF ALL RESPONSES TO PHQ QUESTIONS 1-9: 0
1. LITTLE INTEREST OR PLEASURE IN DOING THINGS: NOT AT ALL
2. FEELING DOWN, DEPRESSED OR HOPELESS: NOT AT ALL

## 2025-07-28 NOTE — PROGRESS NOTES
1. Have you been to the ER, urgent care clinic since your last visit?  Hospitalized since your last visit?No    2. Have you seen or consulted any other health care providers outside of the [facility] since your last visit?  Include any pap smears or colon screening. No  
ROSARIO (dyspnea on exertion)        3. Spinal stenosis of lumbar region, unspecified whether neurogenic claudication present        4. Status post lumbar laminectomy            No orders of the defined types were placed in this encounter.       Plan:     Aundrea Simon is a 84 y.o. female is here for routine follow-up.  She was originally seen by me for preoperative cardiovascular risk assessment for lumbar spinal surgery with Dr. Santiago in April 2025.  The patient has shortness of breath that she attributes to vocal cord dysfunction.  She has a right bundle branch block on EKG.     ROSARIO, preoperative cardiovascular risk assessment for lumbar spinal surgery  - 5/2025 patient had normal Lexiscan Cardiolite and echo  -Tolerated surgery without cardiac incident     History of pericarditis 2020, previously seen by Dr. Doshi    Spinal stenosis status post L5/S1 POSTERIOR LUMBAR LAMINECTOMY   By Dr. Santiago June 2025:  - Patient states she has some numbness in the right lower extremity involving the leg and the foot that feels like freeze or burn at times  -Patient also notes that she had low blood pressure for couple weeks after surgery which seems to be normalizing now     Maxillary regine:  -Patient has read that this can make issues with surgery like intubation etc. more difficult and I advised her to notify anesthesia prior to surgery     Patient will follow-up with me as needed in the future.       Sal Diggs MD

## 2025-08-13 ENCOUNTER — HOSPITAL ENCOUNTER (OUTPATIENT)
Facility: HOSPITAL | Age: 85
Discharge: HOME | End: 2025-08-16
Attending: INTERNAL MEDICINE
Payer: MEDICARE

## 2025-08-13 VITALS — HEIGHT: 62 IN | BODY MASS INDEX: 23.37 KG/M2 | WEIGHT: 127 LBS

## 2025-08-13 DIAGNOSIS — Z12.31 SCREENING MAMMOGRAM FOR BREAST CANCER: ICD-10-CM

## 2025-08-13 PROCEDURE — 77063 BREAST TOMOSYNTHESIS BI: CPT

## (undated) DEVICE — DRAPE C ARM W/ POLY STRP W42XL72IN FOR MOB XR

## (undated) DEVICE — SOLIDIFIER FLUID 3000 CC ABSORB

## (undated) DEVICE — BONE WAX WHITE: Brand: BONE WAX WHITE

## (undated) DEVICE — POSITIONER HD REST FOAM CMFRT TCH

## (undated) DEVICE — SUTURE MONOCRYL STRATAFIX SPRL + SZ 3-0 L24IN ABSRB UD PS-2 SXMP1B108

## (undated) DEVICE — SOLUTION IV 250ML 0.9% SOD CHL CLR INJ FLX BG CONT PRT CLSR

## (undated) DEVICE — STRIP,CLOSURE,WOUND,MEDI-STRIP,1/2X4: Brand: MEDLINE

## (undated) DEVICE — GLOVE SURG SZ 8 L12IN FNGR THK79MIL GRN LTX FREE

## (undated) DEVICE — EVACUATOR SMOKE L 10 FT SMOKE MANAGEMENT EXTENDED EDGE ELECTRODE STERILE DISP

## (undated) DEVICE — KIT IV STRT W CHLORAPREP PD 1ML

## (undated) DEVICE — COVER,MAYO STAND,STERILE: Brand: MEDLINE

## (undated) DEVICE — ESOPHAGEAL BALLOON DILATATION CATHETER: Brand: CRE FIXED WIRE

## (undated) DEVICE — FORCEPS BX L240CM JAW DIA2.4MM ORNG L CAP W/ NDL DISP RAD

## (undated) DEVICE — GLOVE SURG SZ 75 L12IN FNGR THK94MIL STD WHT LTX FREE

## (undated) DEVICE — X-RAY DETECTABLE SPONGES,16 PLY: Brand: VISTEC

## (undated) DEVICE — SET ADMIN 16ML TBNG L100IN 2 Y INJ SITE IV PIGGY BK DISP

## (undated) DEVICE — LAMINECTOMY-SMH: Brand: MEDLINE INDUSTRIES, INC.

## (undated) DEVICE — CATH IV AUTOGRD BC BLU 22GA 25 -- INSYTE

## (undated) DEVICE — BW-412T DISP COMBO CLEANING BRUSH: Brand: SINGLE USE COMBINATION CLEANING BRUSH

## (undated) DEVICE — SET EXTN TBNG L BOR 4 W STPCOCK ST 32IN PRIMING VOL 6ML

## (undated) DEVICE — 4-PORT MANIFOLD: Brand: NEPTUNE 2

## (undated) DEVICE — PAD,ABDOMINAL,5"X9",ST,LF,25/BX: Brand: MEDLINE INDUSTRIES, INC.

## (undated) DEVICE — CONNECTOR TBNG AUX H2O JET DISP FOR OLY 160/180 SER

## (undated) DEVICE — AIRLIFE™ U/CONNECT-IT OXYGEN TUBING 7 FEET (2.1 M) CRUSH-RESISTANT OXYGEN TUBING, VINYL TIPPED: Brand: AIRLIFE™

## (undated) DEVICE — 1200 GUARD II KIT W/5MM TUBE W/O VAC TUBE: Brand: GUARDIAN

## (undated) DEVICE — BIPOLAR IRRIGATOR INTEGRATED TUBING AND BIPOLAR CORD SET, DISPOSABLE

## (undated) DEVICE — ENDO CARRY-ON PROCEDURE KIT INCLUDES ENZYMATIC SPONGE, GAUZE, BIOHAZARD LABEL, TRAY, LUBRICANT, DIRTY SCOPE LABEL, WATER LABEL, TRAY, DRAWSTRING PAD, AND DEFENDO 4-PIECE KIT.: Brand: ENDO CARRY-ON PROCEDURE KIT

## (undated) DEVICE — MASTISOL ADHESIVE LIQ 2/3ML

## (undated) DEVICE — NEEDLE HYPO 18GA L1.5IN PNK S STL HUB POLYPR SHLD REG BVL

## (undated) DEVICE — BAG BELONG PT PERS CLEAR HANDL

## (undated) DEVICE — SYRINGE INFL 60ML DISP ALLIANCE II

## (undated) DEVICE — Z DISCONTINUED USE 2751540 TUBING IRRIG L10IN DISP PMP ENDOGATOR

## (undated) DEVICE — QUILTED PREMIUM COMFORT UNDERPAD,EXTRA HEAVY: Brand: WINGS

## (undated) DEVICE — KENDALL RADIOLUCENT FOAM MONITORING ELECTRODE -RECTANGULAR SHAPE: Brand: KENDALL

## (undated) DEVICE — SYRINGE MED 20ML STD CLR PLAS LUERLOCK TIP N CTRL DISP